# Patient Record
Sex: MALE | Race: WHITE | NOT HISPANIC OR LATINO | Employment: OTHER
[De-identification: names, ages, dates, MRNs, and addresses within clinical notes are randomized per-mention and may not be internally consistent; named-entity substitution may affect disease eponyms.]

---

## 2017-01-02 ENCOUNTER — RECORDS - HEALTHEAST (OUTPATIENT)
Dept: ADMINISTRATIVE | Facility: OTHER | Age: 72
End: 2017-01-02

## 2017-01-06 ENCOUNTER — RECORDS - HEALTHEAST (OUTPATIENT)
Dept: ADMINISTRATIVE | Facility: OTHER | Age: 72
End: 2017-01-06

## 2017-02-03 ENCOUNTER — RECORDS - HEALTHEAST (OUTPATIENT)
Dept: ADMINISTRATIVE | Facility: OTHER | Age: 72
End: 2017-02-03

## 2017-02-06 ENCOUNTER — RECORDS - HEALTHEAST (OUTPATIENT)
Dept: ADMINISTRATIVE | Facility: OTHER | Age: 72
End: 2017-02-06

## 2017-02-07 ENCOUNTER — OFFICE VISIT - HEALTHEAST (OUTPATIENT)
Dept: FAMILY MEDICINE | Facility: CLINIC | Age: 72
End: 2017-02-07

## 2017-02-07 DIAGNOSIS — Z09 HOSPITAL DISCHARGE FOLLOW-UP: ICD-10-CM

## 2017-02-07 DIAGNOSIS — Z95.5 S/P CORONARY ARTERY STENT PLACEMENT: ICD-10-CM

## 2017-02-13 ENCOUNTER — RECORDS - HEALTHEAST (OUTPATIENT)
Dept: ADMINISTRATIVE | Facility: OTHER | Age: 72
End: 2017-02-13

## 2017-02-20 ENCOUNTER — COMMUNICATION - HEALTHEAST (OUTPATIENT)
Dept: FAMILY MEDICINE | Facility: CLINIC | Age: 72
End: 2017-02-20

## 2017-02-20 DIAGNOSIS — Z95.5 S/P CORONARY ARTERY STENT PLACEMENT: ICD-10-CM

## 2017-02-21 ENCOUNTER — COMMUNICATION - HEALTHEAST (OUTPATIENT)
Dept: FAMILY MEDICINE | Facility: CLINIC | Age: 72
End: 2017-02-21

## 2017-03-23 ENCOUNTER — RECORDS - HEALTHEAST (OUTPATIENT)
Dept: ADMINISTRATIVE | Facility: OTHER | Age: 72
End: 2017-03-23

## 2017-03-29 ENCOUNTER — RECORDS - HEALTHEAST (OUTPATIENT)
Dept: ADMINISTRATIVE | Facility: OTHER | Age: 72
End: 2017-03-29

## 2017-07-21 ENCOUNTER — RECORDS - HEALTHEAST (OUTPATIENT)
Dept: ADMINISTRATIVE | Facility: OTHER | Age: 72
End: 2017-07-21

## 2017-07-21 ENCOUNTER — OFFICE VISIT - HEALTHEAST (OUTPATIENT)
Dept: FAMILY MEDICINE | Facility: CLINIC | Age: 72
End: 2017-07-21

## 2017-07-21 DIAGNOSIS — I83.90 VARICOSE VEIN OF LEG: ICD-10-CM

## 2017-07-21 DIAGNOSIS — H26.9 CATARACT: ICD-10-CM

## 2017-07-21 DIAGNOSIS — Z01.818 PREOP GENERAL PHYSICAL EXAM: ICD-10-CM

## 2017-07-21 ASSESSMENT — MIFFLIN-ST. JEOR: SCORE: 1710.37

## 2017-09-27 ENCOUNTER — RECORDS - HEALTHEAST (OUTPATIENT)
Dept: ADMINISTRATIVE | Facility: OTHER | Age: 72
End: 2017-09-27

## 2017-11-16 ENCOUNTER — COMMUNICATION - HEALTHEAST (OUTPATIENT)
Dept: SCHEDULING | Facility: CLINIC | Age: 72
End: 2017-11-16

## 2017-11-17 ENCOUNTER — OFFICE VISIT - HEALTHEAST (OUTPATIENT)
Dept: FAMILY MEDICINE | Facility: CLINIC | Age: 72
End: 2017-11-17

## 2017-11-17 DIAGNOSIS — R04.0 NASAL BLEEDING: ICD-10-CM

## 2017-11-17 DIAGNOSIS — Z95.5 S/P CORONARY ARTERY STENT PLACEMENT: ICD-10-CM

## 2017-11-17 DIAGNOSIS — R09.82 POST-NASAL DRIP: ICD-10-CM

## 2017-11-23 ENCOUNTER — RECORDS - HEALTHEAST (OUTPATIENT)
Dept: ADMINISTRATIVE | Facility: OTHER | Age: 72
End: 2017-11-23

## 2017-11-29 ENCOUNTER — RECORDS - HEALTHEAST (OUTPATIENT)
Dept: ADMINISTRATIVE | Facility: OTHER | Age: 72
End: 2017-11-29

## 2018-02-22 ENCOUNTER — COMMUNICATION - HEALTHEAST (OUTPATIENT)
Dept: FAMILY MEDICINE | Facility: CLINIC | Age: 73
End: 2018-02-22

## 2018-02-22 DIAGNOSIS — Z95.5 S/P CORONARY ARTERY STENT PLACEMENT: ICD-10-CM

## 2018-03-13 ENCOUNTER — RECORDS - HEALTHEAST (OUTPATIENT)
Dept: ADMINISTRATIVE | Facility: OTHER | Age: 73
End: 2018-03-13

## 2018-05-09 ENCOUNTER — COMMUNICATION - HEALTHEAST (OUTPATIENT)
Dept: FAMILY MEDICINE | Facility: CLINIC | Age: 73
End: 2018-05-09

## 2018-05-09 DIAGNOSIS — Z95.5 S/P CORONARY ARTERY STENT PLACEMENT: ICD-10-CM

## 2018-06-06 ENCOUNTER — RECORDS - HEALTHEAST (OUTPATIENT)
Dept: ADMINISTRATIVE | Facility: OTHER | Age: 73
End: 2018-06-06

## 2018-09-06 ENCOUNTER — RECORDS - HEALTHEAST (OUTPATIENT)
Dept: ADMINISTRATIVE | Facility: OTHER | Age: 73
End: 2018-09-06

## 2018-09-07 ENCOUNTER — RECORDS - HEALTHEAST (OUTPATIENT)
Dept: ADMINISTRATIVE | Facility: OTHER | Age: 73
End: 2018-09-07

## 2018-10-09 ENCOUNTER — RECORDS - HEALTHEAST (OUTPATIENT)
Dept: ADMINISTRATIVE | Facility: OTHER | Age: 73
End: 2018-10-09

## 2018-10-23 ENCOUNTER — RECORDS - HEALTHEAST (OUTPATIENT)
Dept: GENERAL RADIOLOGY | Facility: CLINIC | Age: 73
End: 2018-10-23

## 2018-10-23 ENCOUNTER — OFFICE VISIT - HEALTHEAST (OUTPATIENT)
Dept: FAMILY MEDICINE | Facility: CLINIC | Age: 73
End: 2018-10-23

## 2018-10-23 DIAGNOSIS — R09.82 POST-NASAL DRIP: ICD-10-CM

## 2018-10-23 DIAGNOSIS — R20.0 ANESTHESIA OF SKIN: ICD-10-CM

## 2018-10-23 DIAGNOSIS — R05.9 COUGH: ICD-10-CM

## 2018-10-23 DIAGNOSIS — R20.0 NUMBNESS IN BOTH HANDS: ICD-10-CM

## 2018-10-27 ENCOUNTER — AMBULATORY - HEALTHEAST (OUTPATIENT)
Dept: FAMILY MEDICINE | Facility: CLINIC | Age: 73
End: 2018-10-27

## 2018-10-27 DIAGNOSIS — R20.0 NUMBNESS IN BOTH HANDS: ICD-10-CM

## 2018-10-29 ENCOUNTER — AMBULATORY - HEALTHEAST (OUTPATIENT)
Dept: PHYSICAL MEDICINE AND REHAB | Facility: CLINIC | Age: 73
End: 2018-10-29

## 2018-11-01 ENCOUNTER — OFFICE VISIT - HEALTHEAST (OUTPATIENT)
Dept: FAMILY MEDICINE | Facility: CLINIC | Age: 73
End: 2018-11-01

## 2018-11-01 DIAGNOSIS — R05.9 COUGH: ICD-10-CM

## 2018-11-05 ENCOUNTER — AMBULATORY - HEALTHEAST (OUTPATIENT)
Dept: VASCULAR SURGERY | Facility: CLINIC | Age: 73
End: 2018-11-05

## 2018-11-05 DIAGNOSIS — R20.0 ARM NUMBNESS: ICD-10-CM

## 2018-11-08 ENCOUNTER — HOSPITAL ENCOUNTER (OUTPATIENT)
Dept: PHYSICAL MEDICINE AND REHAB | Facility: CLINIC | Age: 73
Discharge: HOME | End: 2018-11-08
Attending: FAMILY MEDICINE

## 2018-11-08 DIAGNOSIS — R20.2 NUMBNESS AND TINGLING IN BOTH HANDS: ICD-10-CM

## 2018-11-08 DIAGNOSIS — R20.0 NUMBNESS AND TINGLING IN BOTH HANDS: ICD-10-CM

## 2018-11-08 DIAGNOSIS — M50.30 DEGENERATION OF CERVICAL INTERVERTEBRAL DISC: ICD-10-CM

## 2018-11-13 ENCOUNTER — COMMUNICATION - HEALTHEAST (OUTPATIENT)
Dept: FAMILY MEDICINE | Facility: CLINIC | Age: 73
End: 2018-11-13

## 2018-11-13 DIAGNOSIS — Z95.5 S/P CORONARY ARTERY STENT PLACEMENT: ICD-10-CM

## 2018-11-14 RX ORDER — METOPROLOL TARTRATE 25 MG/1
TABLET, FILM COATED ORAL
Qty: 90 TABLET | Refills: 3 | Status: SHIPPED | OUTPATIENT
Start: 2018-11-14 | End: 2021-08-04

## 2018-11-20 ENCOUNTER — HOSPITAL ENCOUNTER (OUTPATIENT)
Dept: PHYSICAL MEDICINE AND REHAB | Facility: CLINIC | Age: 73
Discharge: HOME | End: 2018-11-20
Attending: PHYSICAL MEDICINE & REHABILITATION

## 2018-11-20 DIAGNOSIS — R20.0 NUMBNESS AND TINGLING IN BOTH HANDS: ICD-10-CM

## 2018-11-20 DIAGNOSIS — R20.2 NUMBNESS AND TINGLING IN BOTH HANDS: ICD-10-CM

## 2018-11-21 ENCOUNTER — COMMUNICATION - HEALTHEAST (OUTPATIENT)
Dept: PHYSICAL MEDICINE AND REHAB | Facility: CLINIC | Age: 73
End: 2018-11-21

## 2018-11-28 ENCOUNTER — OFFICE VISIT - HEALTHEAST (OUTPATIENT)
Dept: VASCULAR SURGERY | Facility: CLINIC | Age: 73
End: 2018-11-28

## 2018-11-28 ENCOUNTER — RECORDS - HEALTHEAST (OUTPATIENT)
Dept: VASCULAR ULTRASOUND | Facility: CLINIC | Age: 73
End: 2018-11-28

## 2018-11-28 DIAGNOSIS — M79.603 ARM PAIN: ICD-10-CM

## 2018-11-28 DIAGNOSIS — I73.9 PERIPHERAL VASCULAR DISEASE (H): ICD-10-CM

## 2018-11-28 DIAGNOSIS — R20.2 NUMBNESS AND TINGLING IN BOTH HANDS: ICD-10-CM

## 2018-11-28 DIAGNOSIS — R20.0 NUMBNESS AND TINGLING IN BOTH HANDS: ICD-10-CM

## 2018-11-28 DIAGNOSIS — R20.0 ANESTHESIA OF SKIN: ICD-10-CM

## 2018-12-18 ENCOUNTER — RECORDS - HEALTHEAST (OUTPATIENT)
Dept: VASCULAR ULTRASOUND | Facility: CLINIC | Age: 73
End: 2018-12-18

## 2018-12-18 DIAGNOSIS — I73.9 PERIPHERAL VASCULAR DISEASE, UNSPECIFIED (H): ICD-10-CM

## 2018-12-18 DIAGNOSIS — R20.2 PARESTHESIA OF SKIN: ICD-10-CM

## 2018-12-18 DIAGNOSIS — M79.603 PAIN IN ARM, UNSPECIFIED: ICD-10-CM

## 2018-12-18 DIAGNOSIS — R20.0 ANESTHESIA OF SKIN: ICD-10-CM

## 2019-03-25 ENCOUNTER — OFFICE VISIT - HEALTHEAST (OUTPATIENT)
Dept: FAMILY MEDICINE | Facility: CLINIC | Age: 74
End: 2019-03-25

## 2019-03-25 DIAGNOSIS — Z95.5 S/P CORONARY ARTERY STENT PLACEMENT: ICD-10-CM

## 2019-03-25 DIAGNOSIS — Z23 NEED FOR PNEUMOCOCCAL VACCINATION: ICD-10-CM

## 2019-03-25 DIAGNOSIS — D17.20 LIPOMA OF UPPER EXTREMITY, UNSPECIFIED LATERALITY: ICD-10-CM

## 2019-03-25 DIAGNOSIS — Z13.220 ENCOUNTER FOR SCREENING FOR LIPOID DISORDERS: ICD-10-CM

## 2019-03-25 DIAGNOSIS — Z00.00 ROUTINE GENERAL MEDICAL EXAMINATION AT A HEALTH CARE FACILITY: ICD-10-CM

## 2019-03-25 DIAGNOSIS — Z23 VACCINE FOR DIPHTHERIA-TETANUS: ICD-10-CM

## 2019-03-25 DIAGNOSIS — C61 MALIGNANT NEOPLASM OF PROSTATE (H): ICD-10-CM

## 2019-03-25 LAB
ALBUMIN SERPL-MCNC: 3.8 G/DL (ref 3.5–5)
ALP SERPL-CCNC: 93 U/L (ref 45–120)
ALT SERPL W P-5'-P-CCNC: 28 U/L (ref 0–45)
ANION GAP SERPL CALCULATED.3IONS-SCNC: 10 MMOL/L (ref 5–18)
AST SERPL W P-5'-P-CCNC: 28 U/L (ref 0–40)
BILIRUB SERPL-MCNC: 0.6 MG/DL (ref 0–1)
BUN SERPL-MCNC: 17 MG/DL (ref 8–28)
CALCIUM SERPL-MCNC: 9.2 MG/DL (ref 8.5–10.5)
CHLORIDE BLD-SCNC: 107 MMOL/L (ref 98–107)
CHOLEST SERPL-MCNC: 129 MG/DL
CO2 SERPL-SCNC: 24 MMOL/L (ref 22–31)
CREAT SERPL-MCNC: 0.83 MG/DL (ref 0.7–1.3)
FASTING STATUS PATIENT QL REPORTED: YES
GFR SERPL CREATININE-BSD FRML MDRD: >60 ML/MIN/1.73M2
GLUCOSE BLD-MCNC: 99 MG/DL (ref 70–125)
HDLC SERPL-MCNC: 41 MG/DL
LDLC SERPL CALC-MCNC: 75 MG/DL
POTASSIUM BLD-SCNC: 4.5 MMOL/L (ref 3.5–5)
PROT SERPL-MCNC: 7.5 G/DL (ref 6–8)
PSA SERPL-MCNC: <0.1 NG/ML (ref 0–6.5)
SODIUM SERPL-SCNC: 141 MMOL/L (ref 136–145)
TRIGL SERPL-MCNC: 66 MG/DL

## 2019-03-25 RX ORDER — NITROGLYCERIN 0.4 MG/1
0.4 TABLET SUBLINGUAL EVERY 5 MIN PRN
Qty: 90 TABLET | Refills: 3 | Status: SHIPPED | OUTPATIENT
Start: 2019-03-25 | End: 2021-12-29

## 2019-03-25 ASSESSMENT — MIFFLIN-ST. JEOR: SCORE: 1767.41

## 2019-05-14 ENCOUNTER — RECORDS - HEALTHEAST (OUTPATIENT)
Dept: ADMINISTRATIVE | Facility: OTHER | Age: 74
End: 2019-05-14

## 2019-06-08 ENCOUNTER — RECORDS - HEALTHEAST (OUTPATIENT)
Dept: ADMINISTRATIVE | Facility: OTHER | Age: 74
End: 2019-06-08

## 2019-11-19 ENCOUNTER — OFFICE VISIT - HEALTHEAST (OUTPATIENT)
Dept: FAMILY MEDICINE | Facility: CLINIC | Age: 74
End: 2019-11-19

## 2019-11-19 DIAGNOSIS — R05.9 COUGH: ICD-10-CM

## 2019-11-19 DIAGNOSIS — R09.82 POST-NASAL DRIP: ICD-10-CM

## 2019-11-19 ASSESSMENT — MIFFLIN-ST. JEOR: SCORE: 1768.54

## 2020-02-04 ENCOUNTER — RECORDS - HEALTHEAST (OUTPATIENT)
Dept: ADMINISTRATIVE | Facility: OTHER | Age: 75
End: 2020-02-04

## 2020-04-19 ENCOUNTER — COMMUNICATION - HEALTHEAST (OUTPATIENT)
Dept: FAMILY MEDICINE | Facility: CLINIC | Age: 75
End: 2020-04-19

## 2020-04-19 DIAGNOSIS — Z13.220 ENCOUNTER FOR SCREENING FOR LIPOID DISORDERS: ICD-10-CM

## 2020-06-16 ENCOUNTER — RECORDS - HEALTHEAST (OUTPATIENT)
Dept: ADMINISTRATIVE | Facility: OTHER | Age: 75
End: 2020-06-16

## 2020-07-01 ENCOUNTER — RECORDS - HEALTHEAST (OUTPATIENT)
Dept: ADMINISTRATIVE | Facility: OTHER | Age: 75
End: 2020-07-01

## 2020-07-06 ENCOUNTER — AMBULATORY - HEALTHEAST (OUTPATIENT)
Dept: FAMILY MEDICINE | Facility: CLINIC | Age: 75
End: 2020-07-06

## 2020-07-06 ENCOUNTER — VIRTUAL VISIT (OUTPATIENT)
Dept: FAMILY MEDICINE | Facility: OTHER | Age: 75
End: 2020-07-06

## 2020-07-06 DIAGNOSIS — Z20.822 SUSPECTED COVID-19 VIRUS INFECTION: ICD-10-CM

## 2020-07-07 ENCOUNTER — AMBULATORY - HEALTHEAST (OUTPATIENT)
Dept: FAMILY MEDICINE | Facility: CLINIC | Age: 75
End: 2020-07-07

## 2020-07-07 DIAGNOSIS — Z20.822 SUSPECTED COVID-19 VIRUS INFECTION: ICD-10-CM

## 2020-07-07 NOTE — PROGRESS NOTES
"Date: 2020 07:39:55  Clinician: Greer Ventura  Clinician NPI: [id number]  Patient: Daniel Jacobs  Patient : 1945  Patient Address: [address] [place] [id number]  Patient Phone: [phone]  Visit Protocol: URI  Patient Summary:  Daniel is a 75 year old ( : 1945 ) male who initiated a Visit for COVID-19 (Coronavirus) evaluation and screening. When asked the question \"Please sign me up to receive news, health information and promotions from [employer].\", Daniel responded \"No\".    Daniel states his symptoms started gradually 3-4 days ago.   His symptoms consist of tooth pain, diarrhea, enlarged lymph nodes, and a cough.   Symptom details     Cough: Daniel coughs every 5-10 minutes and his cough is more bothersome at night. Phlegm does not come into his throat when he coughs. He believes his cough is caused by post-nasal drip.     Tooth pain: The tooth pain is caused by a cavity, recent dental work, or other mouth problems.      Daniel denies having wheezing, nausea, ageusia, vomiting, rhinitis, malaise, ear pain, headache, chills, sore throat, myalgias, anosmia, facial pain or pressure, fever, and nasal congestion. He also denies having recent facial or sinus surgery in the past 60 days, taking antibiotic medication in the past month, and double sickening (worsening symptoms after initial improvement). He is not experiencing dyspnea.   Precipitating events  He has not recently been exposed to someone with influenza. Daniel has been in close contact with the following high risk individuals: people with asthma, heart disease or diabetes and adults 65 or older.   Pertinent COVID-19 (Coronavirus) information  In the past 14 days, Daniel has not worked in a congregate living setting.   He does not work or volunteer as healthcare worker or a  and does not work or volunteer in a healthcare facility.   Daniel also has not lived in a congregate living setting in the past 14 days. He " does not live with a healthcare worker.   Daniel has not had a close contact with a laboratory-confirmed COVID-19 patient within 14 days of symptom onset.   Pertinent medical history  Daniel does not need a return to work/school note.   Weight: 215 lbs   Daniel does not smoke or use smokeless tobacco.   Additional information as reported by the patient (free text): heart attack in 2017   Weight: 215 lbs    MEDICATIONS: aspirin oral, atorvastatin oral, metoprolol tartrate-hydrochlorothiazide oral, ALLERGIES: codeine, Augmentin  Clinician Response:  Dear Daniel,   Your symptoms show that you may have coronavirus (COVID-19). This illness can cause fever, cough and trouble breathing. Many people get a mild case and get better on their own. Some people can get very sick.  What should I do?  We would like to test you for this virus.   1. Please call [phone] to schedule your visit. Explain that [facility] were [facility] have a COVID-19 test. Be ready to share [facility].  The following will serve as your written order for this COVID Test, ordered by me, for the indication of suspected COVID [Z20.828]: The test will be ordered in [employer] our electronic health record, after you are scheduled. It will show as ordered and authorized by Tez Thompson MD.  Order: COVID-19 (Coronavirus) PCR for SYMPTOMATIC testing from [facility].      2. When it's time for your COVID test:  Stay at least 6 feet away from others. (If someone will drive you to your test, stay in the backseat, as far away from the  as you can.)   Cover your mouth and nose with a mask, tissue or washcloth.  Go straight to the testing site. Don't make any stops on the way there or back.      3.Starting now: Stay home and away from others (self-isolate) until:   You've had no fever---and no medicine that reduces fever---for 3 full days (72 hours). And...   Your other symptoms have gotten better. For example, your cough or breathing has improved.  "And...   At least 10 days have passed since your symptoms started.       During this time, don't leave the house except for testing or medical care.   Stay in your own room, even for meals. Use your own bathroom if you can.   Stay away from others in your home. No hugging, kissing or shaking hands. No visitors.  Don't go to work, school or anywhere else.    Clean \"high touch\" surfaces often (doorknobs, counters, handles, etc.). Use a household cleaning spray or wipes. You'll find a full list of  on the EPA website: www.epa.gov/pesticide-registration/list-n-disinfectants-use-against-sars-cov-2.   Cover your mouth and nose with a mask, tissue or washcloth to avoid spreading germs.  Wash your hands and face often. Use soap and water.  Caregivers in these groups are at risk for severe illness due to COVID-19:  o People 65 years and older  o People who live in a nursing home or long-term care facility  o People with chronic disease (lung, heart, cancer, diabetes, kidney, liver, immunologic)  o People who have a weakened immune system, including those who:   Are in cancer treatment  Take medicine that weakens the immune system, such as corticosteroids  Had a bone marrow or organ transplant  Have an immune deficiency  Have poorly controlled HIV or AIDS  Are obese (body mass index of 40 or higher)  Smoke regularly   o Caregivers should wear gloves while washing dishes, handling laundry and cleaning bedrooms and bathrooms.  o Use caution when washing and drying laundry: Don't shake dirty laundry, and use the warmest water setting that you can.  o For more tips, go to www.cdc.gov/coronavirus/2019-ncov/downloads/10Things.pdf.    4.Sign up for [employer]. We know it's scary to hear that you might have COVID-19. We want to track your symptoms to make sure you're okay over the next 2 weeks. Please look for an email from [employer]---this is a free, online program that we'll use to keep in touch. To sign up, follow the " link in the email. Learn more at [email or web address]  How can I take care of myself?   Get lots of rest. Drink extra fluids (unless a doctor has told you not to).   Take Tylenol (acetaminophen) for fever or pain. If you have liver or kidney problems, ask your family doctor if it's okay to take Tylenol.   Adults can take either:    650 mg (two 325 mg pills) every 4 to 6 hours, or...   1,000 mg (two 500 mg pills) every 8 hours as needed.    Note: Don't take more than 3,000 mg in one day. Acetaminophen is found in many medicines (both prescribed and over-the-counter medicines). Read all labels to be sure you don't take too much.   For children, check the Tylenol bottle for the right dose. The dose is based on the child's age or weight.    If you have other health problems (like cancer, heart failure, an organ transplant or severe kidney disease): Call your specialty clinic if you don't feel better in the next 2 days.       Know when to call 911. Emergency warning signs include:    Trouble breathing or shortness of breath Pain or pressure in the chest that doesn't go away Feeling confused like you haven't felt before, or not being able to wake up Bluish-colored lips or face.  Where can I get more information?   [facility] -- About COVID-19: [email or web address]   CDC -- What to Do If You're Sick: www.cdc.gov/coronavirus/2019-ncov/about/steps-when-sick.html   CDC -- Ending Home Isolation: www.cdc.gov/coronavirus/2019-ncov/hcp/disposition-in-home-patients.html   CDC -- Caring for Someone: www.cdc.gov/coronavirus/2019-ncov/if-you-are-sick/care-for-someone.html   [facility] -- Interim Guidance for Hospital Discharge to Home: www.health.[facility].mn.us/diseases/coronavirus/hcp/hospdischarge.pdf   [facility] clinical trials (COVID-19 research studies): clinicalaffairs.[facility]/umn-clinical-trials    Below are the COVID-19 hotlines at the [place] Department of Health ([facility]). Interpreters are  available.    For health questions: Call 917-545-4578 or 1-501.335.5993 (7 a.m. to 7 p.m.) For questions about schools and childcare: Call 895-798-5611 or 1-164.723.6644 (7 a.m. to 7 p.m.)    Diagnosis: Cough  Diagnosis ICD: R05  Additional Clinician Notes: Dear Daniel, You could possibly have covid. Please call the number listed to be tested and you will also need to self isolate for 10 days per instructions. I hope you feel better soon. Take care.

## 2020-07-10 ENCOUNTER — OFFICE VISIT - HEALTHEAST (OUTPATIENT)
Dept: FAMILY MEDICINE | Facility: CLINIC | Age: 75
End: 2020-07-10

## 2020-07-10 DIAGNOSIS — R09.82 POST-NASAL DRIP: ICD-10-CM

## 2020-07-10 DIAGNOSIS — R05.9 COUGH: ICD-10-CM

## 2020-07-10 RX ORDER — KETOCONAZOLE 20 MG/G
CREAM TOPICAL
Status: SHIPPED | COMMUNITY
Start: 2020-03-01 | End: 2021-12-14

## 2020-07-10 ASSESSMENT — MIFFLIN-ST. JEOR: SCORE: 1786.8

## 2020-07-12 ENCOUNTER — COMMUNICATION - HEALTHEAST (OUTPATIENT)
Dept: FAMILY MEDICINE | Facility: CLINIC | Age: 75
End: 2020-07-12

## 2020-09-23 ENCOUNTER — RECORDS - HEALTHEAST (OUTPATIENT)
Dept: ADMINISTRATIVE | Facility: OTHER | Age: 75
End: 2020-09-23

## 2020-12-22 ENCOUNTER — OFFICE VISIT - HEALTHEAST (OUTPATIENT)
Dept: FAMILY MEDICINE | Facility: CLINIC | Age: 75
End: 2020-12-22

## 2020-12-22 DIAGNOSIS — I73.9 PERIPHERAL VASCULAR DISEASE (H): ICD-10-CM

## 2020-12-22 DIAGNOSIS — C61 MALIGNANT NEOPLASM OF PROSTATE (H): ICD-10-CM

## 2020-12-22 DIAGNOSIS — Z13.220 ENCOUNTER FOR SCREENING FOR LIPOID DISORDERS: ICD-10-CM

## 2020-12-22 DIAGNOSIS — Z11.59 NEED FOR HEPATITIS C SCREENING TEST: ICD-10-CM

## 2020-12-22 DIAGNOSIS — Z00.00 ROUTINE GENERAL MEDICAL EXAMINATION AT A HEALTH CARE FACILITY: ICD-10-CM

## 2020-12-22 LAB
ALBUMIN SERPL-MCNC: 3.8 G/DL (ref 3.5–5)
ALP SERPL-CCNC: 108 U/L (ref 45–120)
ALT SERPL W P-5'-P-CCNC: 23 U/L (ref 0–45)
ANION GAP SERPL CALCULATED.3IONS-SCNC: 7 MMOL/L (ref 5–18)
AST SERPL W P-5'-P-CCNC: 27 U/L (ref 0–40)
BILIRUB SERPL-MCNC: 0.6 MG/DL (ref 0–1)
BUN SERPL-MCNC: 17 MG/DL (ref 8–28)
CALCIUM SERPL-MCNC: 8.8 MG/DL (ref 8.5–10.5)
CHLORIDE BLD-SCNC: 108 MMOL/L (ref 98–107)
CHOLEST SERPL-MCNC: 121 MG/DL
CO2 SERPL-SCNC: 28 MMOL/L (ref 22–31)
CREAT SERPL-MCNC: 0.96 MG/DL (ref 0.7–1.3)
FASTING STATUS PATIENT QL REPORTED: YES
GFR SERPL CREATININE-BSD FRML MDRD: >60 ML/MIN/1.73M2
GLUCOSE BLD-MCNC: 101 MG/DL (ref 70–125)
HDLC SERPL-MCNC: 37 MG/DL
LDLC SERPL CALC-MCNC: 70 MG/DL
POTASSIUM BLD-SCNC: 5.2 MMOL/L (ref 3.5–5)
PROT SERPL-MCNC: 7.2 G/DL (ref 6–8)
PSA SERPL-MCNC: <0.1 NG/ML (ref 0–6.5)
SODIUM SERPL-SCNC: 143 MMOL/L (ref 136–145)
TRIGL SERPL-MCNC: 71 MG/DL

## 2020-12-22 ASSESSMENT — MIFFLIN-ST. JEOR: SCORE: 1811.29

## 2020-12-23 LAB — HCV AB SERPL QL IA: NEGATIVE

## 2021-04-09 ENCOUNTER — OFFICE VISIT - HEALTHEAST (OUTPATIENT)
Dept: FAMILY MEDICINE | Facility: CLINIC | Age: 76
End: 2021-04-09

## 2021-04-09 DIAGNOSIS — I73.9 PERIPHERAL VASCULAR DISEASE (H): ICD-10-CM

## 2021-04-09 DIAGNOSIS — M76.61 ACHILLES TENDINITIS, RIGHT LEG: ICD-10-CM

## 2021-04-09 DIAGNOSIS — K21.9 GASTROESOPHAGEAL REFLUX DISEASE WITHOUT ESOPHAGITIS: ICD-10-CM

## 2021-04-11 ENCOUNTER — COMMUNICATION - HEALTHEAST (OUTPATIENT)
Dept: FAMILY MEDICINE | Facility: CLINIC | Age: 76
End: 2021-04-11

## 2021-04-11 DIAGNOSIS — Z13.220 ENCOUNTER FOR SCREENING FOR LIPOID DISORDERS: ICD-10-CM

## 2021-04-13 RX ORDER — ATORVASTATIN CALCIUM 40 MG/1
TABLET, FILM COATED ORAL
Qty: 90 TABLET | Refills: 3 | Status: SHIPPED | OUTPATIENT
Start: 2021-04-13 | End: 2022-03-30

## 2021-05-27 NOTE — PROGRESS NOTES
Assessment and Plan:       1. Routine general medical examination at a health care facility  - Comprehensive Metabolic Panel  - JIC LAV    2. Encounter for screening for lipoid disorders  - atorvastatin (LIPITOR) 40 MG tablet; Take 1 tablet (40 mg total) by mouth daily.  Dispense: 90 tablet; Refill: 3  - Lipid Cascade, RANDOM    3. S/P coronary artery stent placement  - nitroglycerin (NITROSTAT) 0.4 MG SL tablet; Place 1 tablet (0.4 mg total) under the tongue every 5 (five) minutes as needed for chest pain.  Dispense: 90 tablet; Refill: 3    4. Malignant neoplasm of prostate (H)  - PSA, Diagnostic (Prostatic-Specific Antigen)    5. Vaccine for diphtheria-tetanus  - Td, Preservative Free (green label)    6. Need for pneumococcal vaccination  - Pneumococcal conjugate vaccine 13-valent 6wks-17yrs; >50yrs    Did review his chronic medical concerns which at this time are stable.  We will get his labs as noted.  He also did have a history of prostate cancer which was managed surgically he has had normal prostate PSA in the past but wanted to have it checked because of having seen the urologist for some time now.  This was ordered.  He did have questions regarding doing a marathon.  There is a personal history of cardiovascular disease but he has had to not to use his nitroglycerin.  He is asymptomatic.  I did encourage him to first start with training for the marathon and if he is able to increase consistently then he should be able to run the marathon.  I encouraged him not to push himself excessively.  Labs were ordered as noted and medications were refilled.  His health maintenance is up-to-date but his immunization was updated today 2.  The patient's current medical problems were reviewed.    I have had an Advance Directives discussion with the patient.  The following health maintenance schedule was reviewed with the patient and provided in printed form in the after visit summary:   Health Maintenance   Topic Date  Due     ZOSTER VACCINES (1 of 2) 04/09/1997     ADVANCE DIRECTIVES DISCUSSED WITH PATIENT  02/08/2012     PNEUMOCOCCAL CONJUGATE VACCINE FOR ADULTS (PCV13 OR PREVNAR)  04/09/2012     INFLUENZA VACCINE RULE BASED (1) 08/01/2018     TD 18+ HE  09/11/2018     FALL RISK ASSESSMENT  10/23/2019     COLONOSCOPY  09/07/2023     PNEUMOCOCCAL POLYSACCHARIDE VACCINE AGE 65 AND OVER  Completed        Subjective:   Chief Complaint: Daniel Jacobs is an 73 y.o. male here for an Annual Wellness visit.   HPI: Comes in today for physical exam and wellness visit.  He noted no major concerns at this time, he feels good and has not really been exercising currently.  He wanted to run a marathon or a half marathon and was wondering if i think that he can do that.  He does have a history of cardiovascular stent but noted that he has remained stable and asymptomatic.  He wants to have his medication refilled and updated.  He is otherwise up-to-date and all of his health maintenance.    Review of Systems:    Constitutional:Denied any fatigue no fevers no chills.  Has good appetite.  HEENT: Does not have any neck pain.  No difficulty swallowing denies having any postnasal drips.    Respiratory: There is no cough.  No chest wall pain.  Cardiovascular: Denied chest pain shortness of breath or palpitations.  There is no notable lower extremity swelling.    Gastrointestinal: Denies nausea vomiting.  No abdominal pain no diarrhea or constipation.  Endocrine:Has no sensitivity to cold or heat.  Denied undue thirst.   Genitourinary:Has no urinary symptoms, no nocturia but he does have a pump in his testicle for urinary incontinence management..  Musculoskeletal: There is no musculoskeletal pain and swelling.    Skin: He does not have any rashes.   Allergic/Immunologic: Negative.   Neurological: No Numbness  Hematological: Negative.   Psychiatric/Behavioral: No anxiety or depression symptoms.    Please see above.  The rest of the review of systems  are negative for all systems.    Patient Care Team:  Christina Mims MD as PCP - General (Family Medicine)     Patient Active Problem List   Diagnosis     Prostate Cancer     Prostate Cancer     Nephrolithiasis     Lower Back Pain     Urinary Incontinence     Past Medical History:   Diagnosis Date     Low back pain      Prostate cancer (H) 2007      Past Surgical History:   Procedure Laterality Date     HERNIA REPAIR       INSERT / REPLACE / REMOVE PROSTHESIS URETHRAL SPHINCTER N/A 6/23/2014    Procedure: ARTIFICIAL URINARY SPHINCTER INSERTION;  Surgeon: Torres Sanabria MD;  Location: [facility] OR;  Service:      INSERT / REPLACE / REMOVE PROSTHESIS URETHRAL SPHINCTER N/A 2/16/2015    Procedure: REMOVAL/REPLACEMENT OF ARTIFICIAL URINARY SPHINCTER COMPONENT;  Surgeon: Torres Sanabria MD;  Location: [facility] OR;  Service:      LUMBAR SPINE SURGERY  1981     prostatetomy        Family History   Problem Relation Age of Onset     Prostate cancer Brother       Social History     Socioeconomic History     Marital status:      Spouse name: Not on file     Number of children: Not on file     Years of education: Not on file     Highest education level: Not on file   Occupational History     Not on file   Social Needs     Financial resource strain: Not on file     Food insecurity:     Worry: Not on file     Inability: Not on file     Transportation needs:     Medical: Not on file     Non-medical: Not on file   Tobacco Use     Smoking status: Never Smoker     Smokeless tobacco: Never Used   Substance and Sexual Activity     Alcohol use: No     Drug use: No     Sexual activity: Not on file   Lifestyle     Physical activity:     Days per week: Not on file     Minutes per session: Not on file     Stress: Not on file   Relationships     Social connections:     Talks on phone: Not on file     Gets together: Not on file     Attends [employer] service: Not on file     Active member of club or  "organization: Not on file     Attends meetings of clubs or organizations: Not on file     Relationship status: Not on file     Intimate partner violence:     Fear of current or ex partner: Not on file     Emotionally abused: Not on file     Physically abused: Not on file     Forced sexual activity: Not on file   Other Topics Concern     Not on file   Social History Narrative     Not on file      Current Outpatient Medications   Medication Sig Dispense Refill     aspirin 81 MG tablet Take 81 mg by mouth daily.       atorvastatin (LIPITOR) 40 MG tablet Take 40 mg by mouth.       metoprolol tartrate (LOPRESSOR) 25 MG tablet TAKE ONE-HALF (1/2) TABLET TWICE A DAY 90 tablet 3     OMEGA-3/DHA/EPA/FISH OIL (FISH OIL-OMEGA-3 FATTY ACIDS) 300-1,000 mg capsule Take 2 g by mouth daily.       nitroglycerin (NITROSTAT) 0.4 MG SL tablet Place 1 tablet (0.4 mg total) under the tongue every 5 (five) minutes as needed for chest pain. 90 tablet 0     Current Facility-Administered Medications   Medication Dose Route Frequency Provider Last Rate Last Dose     ipratropium-albuterol 0.5-2.5 mg/3 mL nebulizer solution 3 mL (DUO-NEB)  3 mL Nebulization Once Christina Mims MD          Objective:   Vital Signs:   Visit Vitals  /82 (Patient Site: Left Arm, Patient Position: Sitting, Cuff Size: Adult Large)   Pulse 69   Ht 5' 11.75\" (1.822 m)   Wt 219 lb (99.3 kg)   SpO2 96%   BMI 29.91 kg/m         VisionScreening:  No exam data present      Physical Exam:  General Appearance: Alert, cooperative, no distress, appears stated age  Head: Normocephalic, without obvious abnormality, atraumatic  Eyes: PERRL, conjunctiva/corneas clear, EOM's intact  Ears: Normal TM's and external ear canals, both ears  Nose: Nares normal, septum midline,mucosa normal, no drainage  Throat: Lips, mucosa, and tongue normal; teeth and gums normal  Neck: Supple, symmetrical, trachea midline, no adenopathy;  thyroid: not enlarged, symmetric, no " tenderness.  Back: Symmetric, no curvature, ROM normal, no CVA tenderness  Lungs: Clear to auscultation bilaterally, respirations unlabored  Heart: Regular rate and rhythm, S1 and S2 normal, no murmur, rub, or gallop,  Abdomen: Soft, non-tender, bowel sounds active all four quadrants,  no masses, no organomegaly  Musculoskeletal: Normal range of motion. No joint swelling or deformity.   Extremities: Extremities normal, atraumatic, no cyanosis or edema  Skin: Skin color, texture, turgor normal, no rashes .  He does have a lipoma noted on both forearms distal aspect.  Lymph nodes: Cervical, supraclavicular, and axillary nodes normal  Neurologic: He is alert. He has normal reflexes.   Psychiatric: He has a normal mood and affect.       Assessment Results 3/25/2019   Activities of Daily Living No help needed   Instrumental Activities of Daily Living No help needed   Mini Cog Total Score 5   Some recent data might be hidden     A Mini-Cog score of 0-2 suggests the possibility of dementia, score of 3-5 suggests no dementia    Identified Health Risks:     He is at risk for lack of exercise and has been provided with information to increase physical activity for the benefit of his well-being.  The patient was counseled and encouraged to consider modifying their diet and eating habits. He was provided with information on recommended healthy diet options.  Information on urinary incontinence and treatment options given to patient.  Information regarding advance directives (living [place]), including where he can download the appropriate form, was provided to the patient via the AVS.

## 2021-05-30 ENCOUNTER — RECORDS - HEALTHEAST (OUTPATIENT)
Dept: ADMINISTRATIVE | Facility: CLINIC | Age: 76
End: 2021-05-30

## 2021-05-30 VITALS — BODY MASS INDEX: 27.88 KG/M2 | WEIGHT: 211.3 LBS

## 2021-05-31 ENCOUNTER — RECORDS - HEALTHEAST (OUTPATIENT)
Dept: ADMINISTRATIVE | Facility: CLINIC | Age: 76
End: 2021-05-31

## 2021-05-31 VITALS — WEIGHT: 203.8 LBS | HEIGHT: 73 IN | BODY MASS INDEX: 27.01 KG/M2

## 2021-05-31 VITALS — BODY MASS INDEX: 27.29 KG/M2 | WEIGHT: 204 LBS

## 2021-06-02 VITALS — HEIGHT: 72 IN | WEIGHT: 219 LBS | BODY MASS INDEX: 29.66 KG/M2

## 2021-06-02 VITALS — WEIGHT: 217 LBS | BODY MASS INDEX: 29.03 KG/M2

## 2021-06-02 VITALS — WEIGHT: 219 LBS | BODY MASS INDEX: 29.29 KG/M2

## 2021-06-02 VITALS — WEIGHT: 213.1 LBS | BODY MASS INDEX: 28.5 KG/M2

## 2021-06-03 NOTE — PROGRESS NOTES
Assessment/Plan:        Diagnoses and all orders for this visit:    Post-nasal drip    Cough  -     azithromycin (ZITHROMAX) 250 MG tablet; Take 500 mg (2 x 250 mg tablets) on day 1 followed by 250 mg (1 tablet) on days 2-5.  Dispense: 6 tablet; Refill: 0  The cough has been going on for the past 3 weeks and appears to be getting worse though it is some dry cough he does have postnasal drip I think that he still has some irritation on the sinuses which is causing the postnasal drip.  He will continue to use the over-the-counter medications that he has used in the past.Started on Zithromax and he will follow-up if there is no improvement.        Subjective:    Patient ID: Daniel Jacobs is a 74 y.o. male.    72-year-old male who comes in today with concerns of having cough that has been going on for the past 3 weeks.  He noted 3 weeks ago starting to have some runny nose and congestion to the sinuses.  Noted having some cough as at that time.  Symptoms of sinus congestion has since then gotten better and disappeared but he continues to cough.  Cough is said to be worse in the evening or when he is laying down at night when he is sleeping.  It is intermittently dry as well as productive sometimes.  He does not have any runny nose but noted having some tickle behind his throat.  He does have mild sore throat.  He denies having any chest pain no shortness of breath and does not have any wheezing.  He noted no headaches, no ear pain, and no dizziness or lightheadedness.  He has been having a slight fatigue because of not sleeping.  Noted cough been bothersome to the wife.      The following portions of the patient's history were reviewed and updated as appropriate: allergies, current medications, past family history, past medical history, past social history, past surgical history and problem list.    Review of Systems   Constitutional: Positive for fatigue. Negative for appetite change, chills and fever.   HENT:  Positive for postnasal drip and sore throat. Negative for congestion, rhinorrhea, sinus pressure, sinus pain and trouble swallowing.    Respiratory: Positive for cough. Negative for chest tightness, shortness of breath and wheezing.    Cardiovascular: Negative for chest pain.   Genitourinary: Negative.    Neurological: Negative for light-headedness and headaches.     Vitals:    11/19/19 1039   BP: 118/58   Pulse: 73   Temp: 97.5  F (36.4  C)   TempSrc: Oral   SpO2: 98%   Weight: 218 lb 6 oz (99.1 kg)   Height: 6' (1.829 m)             Objective:    Physical Exam   Constitutional: He appears well-developed and well-nourished. No distress.   HENT:   Right Ear: Tympanic membrane normal.   Left Ear: Tympanic membrane normal.   Nose: Right sinus exhibits no maxillary sinus tenderness and no frontal sinus tenderness. Left sinus exhibits no maxillary sinus tenderness and no frontal sinus tenderness.   Mouth/Throat: No oropharyngeal exudate, posterior oropharyngeal edema or posterior oropharyngeal erythema.   He does have mild postnasal drip.   Neck: Normal range of motion. No thyromegaly present.   Cardiovascular: Normal rate and regular rhythm.   Pulmonary/Chest: Effort normal and breath sounds normal.   Lymphadenopathy:     He has no cervical adenopathy.   Neurological: He is alert.

## 2021-06-04 VITALS
HEART RATE: 75 BPM | TEMPERATURE: 97.8 F | HEIGHT: 72 IN | OXYGEN SATURATION: 96 % | WEIGHT: 222.4 LBS | SYSTOLIC BLOOD PRESSURE: 110 MMHG | BODY MASS INDEX: 30.12 KG/M2 | DIASTOLIC BLOOD PRESSURE: 68 MMHG

## 2021-06-04 VITALS
TEMPERATURE: 97.5 F | HEART RATE: 73 BPM | DIASTOLIC BLOOD PRESSURE: 58 MMHG | OXYGEN SATURATION: 98 % | SYSTOLIC BLOOD PRESSURE: 118 MMHG | WEIGHT: 218.38 LBS | BODY MASS INDEX: 29.58 KG/M2 | HEIGHT: 72 IN

## 2021-06-05 VITALS
HEART RATE: 70 BPM | BODY MASS INDEX: 31.67 KG/M2 | OXYGEN SATURATION: 99 % | WEIGHT: 233.5 LBS | SYSTOLIC BLOOD PRESSURE: 122 MMHG | DIASTOLIC BLOOD PRESSURE: 60 MMHG

## 2021-06-05 VITALS
HEART RATE: 76 BPM | TEMPERATURE: 97.6 F | HEIGHT: 72 IN | WEIGHT: 227.8 LBS | BODY MASS INDEX: 30.85 KG/M2 | SYSTOLIC BLOOD PRESSURE: 124 MMHG | DIASTOLIC BLOOD PRESSURE: 80 MMHG

## 2021-06-08 NOTE — PROGRESS NOTES
ASSESSMENT:  1. Hospital discharge follow-up    2. S/P coronary artery stent placement  - metoprolol tartrate (LOPRESSOR) 25 MG tablet; Take 0.5 tablets (12.5 mg total) by mouth 2 (two) times a day.  Dispense: 90 tablet; Refill: 3  - atorvastatin (LIPITOR) 80 MG tablet; Take 1 tablet (80 mg total) by mouth bedtime.  Dispense: 90 tablet; Refill: 3  - ticagrelor (BRILINTA) 90 mg Tab; Take 1 tablet (90 mg total) by mouth 2 (two) times a day.  Dispense: 180 tablet; Refill: 3      PLAN:  There are no Patient Instructions on file for this visit.    No orders of the defined types were placed in this encounter.    Medications Discontinued During This Encounter   Medication Reason     metoprolol tartrate (LOPRESSOR) 25 MG tablet Reorder     atorvastatin (LIPITOR) 80 MG tablet Reorder     ticagrelor (BRILINTA) 90 mg Tab Reorder       No Follow-up on file.     All medications were reconciled. Encouraged patient to stay active as tolerable. Discussed healthy fats and eating a balanced diet. Encouraged patient to increase vegetables and fruits in his diet. Advised patient to watch out for moodiness. Advised patient to use Vaseline for bloody nose; also encouraged saline nasal sprays. Refilled medications, as noted.     CHIEF COMPLAINT:  Chief Complaint   Patient presents with     Hospital Visit Follow Up     [facility] from [facility] 2/3       HISTORY OF PRESENT ILLNESS:  Daniel is a 71 y.o. male presenting to the clinic today with his wife for a hospital visit follow-up regarding myocardial infarction. On the morning of February 3, he was lying in bed because he did not work that day. He normally does not sleep on his stomach, but he rolled on his stomach for some reason. He began to experience left-sided chest pain and sweating. He went to the bathroom and had some diarrhea. He went back to bed, his wife came to the bedroom, and she notes that he looked pale. They called 911, and he was brought to [facility] by ambulance. He was  admitted the same day. He had angioplasty and stenting of the LAD, which was 100% blocked. He was discharged on February 6, and he was started on metoprolol, atorvastatin, and ticagrelor. Today, he overall feels much better. He has an occasional bloody nose, and he has been using a humidifier. He denies muscle aches and pains, chest pain, and dizziness. He will start cardiac rehab and have a follow-up with the Cardiologist. He has an MRI of the heart scheduled for February 13.     REVIEW OF SYSTEMS:   He does not snore. He does not have swelling in his feet. All other systems are negative.     PFSH:  Reviewed, as below.     TOBACCO USE:  History   Smoking Status     Never Smoker   Smokeless Tobacco     Never Used       VITALS:  Vitals:    02/07/17 1138   BP: 124/64   Patient Site: Left Arm   Patient Position: Sitting   Cuff Size: Adult Regular   Pulse: 76   Weight: 211 lb 4.8 oz (95.8 kg)     Wt Readings from Last 3 Encounters:   02/07/17 211 lb 4.8 oz (95.8 kg)   12/13/16 212 lb 3.2 oz (96.3 kg)   02/16/15 209 lb 6.4 oz (95 kg)     Body mass index is 27.88 kg/(m^2).    PHYSICAL EXAM:  General Appearance: Alert, cooperative, no distress, appears stated age  Head: Normocephalic, without obvious abnormality, atraumatic  Eyes: Pupils equal, symmetric  Ears: Normal TMs and external ear canals, both ears  Nose: Nares normal, septum midline, mucosa normal, no drainage  Throat: Lips, mucosa, and tongue normal; teeth and gums normal  Neck: Supple, symmetrical, trachea midline, no adenopathy;  thyroid: not enlarged, symmetric, no tenderness/mass/nodules  Lungs: Clear to auscultation bilaterally, respirations unlabored  Heart: Regular rate and rhythm, S1 and S2 normal, no murmur, rub, or gallop  Abdomen: Soft, non-tender, bowel sounds active all four quadrants, no masses, no organomegaly  Musculoskeletal: Normal range of motion. No joint swelling or deformity.   Extremities normal, atraumatic, no cyanosis or edema  Skin:  Right groin area has a puncture site that is healing with surrounding mild bruise, non-tender, no signs of infection.   Lymph nodes: Cervical nodes normal  Neurologic:  Alert and oriented times 3. Normal reflexes. Cranial nerves II-XII intact.   Psychiatric: Normal mood and affect.      QUALITY MEASURES:  The following high BMI interventions were performed this visit: encouragement to exercise      ADDITIONAL HISTORY SUMMARIZED (2): None.  DECISION TO OBTAIN EXTRA INFORMATION (1): Requested information from Care everywhere.  RADIOLOGY TESTS (1): None.  LABS (1): None.  MEDICINE TESTS (1): None.  INDEPENDENT REVIEW (2 each): None.     The visit lasted a total of 28 minutes face to face with the patient. Over 50% of the time was spent counseling and educating the patient about s/p myocardial infarction. An additional 1 minute was spent on exercise encouragement.    Quincy GRUBER, am scribing for and in the presence of, Dr. Mims.    Dr. Felicita GRUBER, personally performed the services described in this documentation, as scribed by Quincy Soni in my presence, and it is both accurate and complete.    MEDICATIONS:  Current Outpatient Prescriptions   Medication Sig Dispense Refill     aspirin 81 MG tablet Take 81 mg by mouth daily.       atorvastatin (LIPITOR) 80 MG tablet Take 1 tablet (80 mg total) by mouth bedtime. 90 tablet 3     metoprolol tartrate (LOPRESSOR) 25 MG tablet Take 0.5 tablets (12.5 mg total) by mouth 2 (two) times a day. 90 tablet 3     nitroglycerin (NITROSTAT) 0.4 MG SL tablet Place 0.4 mg under the tongue.       OMEGA-3/DHA/EPA/FISH OIL (FISH OIL-OMEGA-3 FATTY ACIDS) 300-1,000 mg capsule Take 2 g by mouth daily.       ticagrelor (BRILINTA) 90 mg Tab Take 1 tablet (90 mg total) by mouth 2 (two) times a day. 180 tablet 3     No current facility-administered medications for this visit.        Total data points: 1

## 2021-06-09 NOTE — PROGRESS NOTES
Assessment/Plan:        Diagnoses and all orders for this visit:    Cough  -     azithromycin (ZITHROMAX) 250 MG tablet; Take 500 mg (2 x 250 mg tablets) on day 1 followed by 250 mg (1 tablet) on days 2-5.  Dispense: 6 tablet; Refill: 0    Post-nasal drip  He has post nasal drainage may be from allergy or mild infection. Has been going on for the past 3 weeks. Tested negative for Covid 19. Will treat him as noted above. He does not need Allergy testing.        Subjective:    Patient ID: Daniel Jacobs is a 75 y.o. male.    Cough   This is a new problem. The current episode started 1 to 4 weeks ago (3 weeks ago). The problem occurs constantly. The problem has been waxing and waning. Associated symptoms include coughing. Pertinent negatives include no abdominal pain, change in bowel habit, chills, congestion, fatigue, fever, sore throat or visual change. He has tried acetaminophen for the symptoms. The treatment provided mild relief.   Had same illness about 6 months ago. Worried about allergy and tooth infection that he has.    The following portions of the patient's history were reviewed and updated as appropriate: allergies, current medications, past family history, past medical history, past social history, past surgical history and problem list.    Review of Systems   Constitutional: Negative for chills, fatigue and fever.   HENT: Positive for postnasal drip. Negative for congestion, ear discharge, ear pain, rhinorrhea, sinus pressure, sinus pain and sore throat.    Respiratory: Positive for cough. Negative for chest tightness and wheezing.    Gastrointestinal: Negative for abdominal pain and change in bowel habit.     Vitals:    07/10/20 0916   BP: 110/68   Patient Site: Left Arm   Patient Position: Sitting   Cuff Size: Adult Regular   Pulse: 75   Temp: 97.8  F (36.6  C)   SpO2: 96%   Weight: (!) 222 lb 6.4 oz (100.9 kg)   Height: 6' (1.829 m)             Objective:    Physical Exam   Constitutional: He appears  well-developed and well-nourished. No distress.   HENT:   Right Ear: Tympanic membrane normal.   Left Ear: Tympanic membrane normal.   Nose: Nose normal.   Mild post nasal drainage.   Neck: Normal range of motion.   Cardiovascular: Normal rate and regular rhythm.   Pulmonary/Chest: Effort normal and breath sounds normal. He has no wheezes. He has no rales.   Lymphadenopathy:     He has no cervical adenopathy.

## 2021-06-12 NOTE — PROGRESS NOTES
Assessment:        72 y.o. male with planned surgery for cataract removal left eye on 14 August 2017.  Known risk factors for perioperative complications: Coronary disease    Difficulty with intubation is not anticipated if needed.    Cardiac Risk Estimation: Has personal history of CVD with stent placement about 6 months ago.  He is currently symptomatic.Daniel Jacobs continues to be active active.Has more than 4 METs for Metabolic equivalents.  Surgical risk: Surgical class II.  Perioperative cardiac risk: 0.9% risk of major cardiac event.  There is no cardiovascular contraindication for the planned surgery as surgery is low risk and patient is stable with his cardiovascular disease..  Current medications which may produce withdrawal symptoms if withheld perioperatively: None     He has concerns of dry cough which likelihood is associated with probably lisinopril.  He noted the cough is very mild and not disturbing.  I advised that he watch that if cough is becoming more bothersome or more regular will need to take off lisinopril.  Blood pressure is well controlled to slightly low but he does not have any lightheadedness.  Will continue with the medications at this point.   Plan:      1. Preoperative workup as follows :none but he has an echocardiogram that was done in March 2017..  2. Change in medication regimen before surgery: He is advised to continue with his medications as is, he will take his lisinopril as well as metoprolol the morning of the surgery, he can continue with the Brilinta as well as aspirin..  3. Prophylaxis for cardiac events with perioperative beta-blockers: Not indicated the patient is already on metoprolol..  4. Invasive hemodynamic monitoring perioperatively: not indicated.  5. Deep vein thrombosis prophylaxis postoperatively:Not indicated..  6. Surveillance for postoperative MI with ECG immediately postoperatively and on postoperative days 1 and 2 AND troponin levels 24 hours  postoperatively and on day 4 or hospital discharge (whichever comes first): not indicated.  7. Other measures: None      Subjective:      Daniel Jacobs is a 72 y.o. male who presents to the office today for a preoperative consultation at the request of surgeon Dr Krishnamurthy  who plans on performing left-sided cataract removal on August 14. This consultation is requested for the specific conditions prompting preoperative evaluation (i.e. because of potential affect on operative risk): Cardiovascular risk stratification due to cardiovascular disease. Planned anesthesia: According to surgeon's discretion.. The patient has the following known anesthesia issues: No prior anesthetic complications. Patients bleeding risk: no remote history of abnormal bleeding.     Past Medical History:   Diagnosis Date     Low back pain      Prostate cancer 2007     Past Surgical History:   Procedure Laterality Date     HERNIA REPAIR       INSERT / REPLACE / REMOVE PROSTHESIS URETHRAL SPHINCTER N/A 6/23/2014    Procedure: ARTIFICIAL URINARY SPHINCTER INSERTION;  Surgeon: Torres Sanabria MD;  Location: [facility];  Service:      INSERT / REPLACE / REMOVE PROSTHESIS URETHRAL SPHINCTER N/A 2/16/2015    Procedure: REMOVAL/REPLACEMENT OF ARTIFICIAL URINARY SPHINCTER COMPONENT;  Surgeon: Torres Sanabria MD;  Location: [facility];  Service:      prostatetomy       Social History     Social History     Marital status:      Spouse name: N/A     Number of children: N/A     Years of education: N/A     Social History Main Topics     Smoking status: Never Smoker     Smokeless tobacco: Never Used     Alcohol use No     Drug use: No     Sexual activity: Not Asked     Other Topics Concern     None     Social History Narrative     Family History   Problem Relation Age of Onset     Prostate cancer Brother      Allergies   Allergen Reactions     Augmentin [Amoxicillin-Pot Clavulanate] Anaphylaxis     Codeine-Guaifenesin Anaphylaxis      "Has tolerated vicodin and percocet w/o issue.     Current Outpatient Prescriptions   Medication Sig Dispense Refill     aspirin 81 MG tablet Take 81 mg by mouth daily.       atorvastatin (LIPITOR) 80 MG tablet Take 1 tablet (80 mg total) by mouth bedtime. 90 tablet 3     lisinopril (PRINIVIL,ZESTRIL) 5 MG tablet Take 1 tablet (5 mg total) by mouth daily. 90 tablet 3     metoprolol tartrate (LOPRESSOR) 25 MG tablet Take 0.5 tablets (12.5 mg total) by mouth 2 (two) times a day. 90 tablet 3     nitroglycerin (NITROSTAT) 0.4 MG SL tablet Place 1 tablet (0.4 mg total) under the tongue every 5 (five) minutes as needed for chest pain. 90 tablet 0     OMEGA-3/DHA/EPA/FISH OIL (FISH OIL-OMEGA-3 FATTY ACIDS) 300-1,000 mg capsule Take 2 g by mouth daily.       ticagrelor (BRILINTA) 90 mg Tab Take 1 tablet (90 mg total) by mouth 2 (two) times a day. 180 tablet 3     No current facility-administered medications for this visit.          Review of Systems  Constitutional: negative for chills, fatigue and weight loss  Eyes: Normal except for the cataract  Ears, nose, mouth, throat, and face: negative  Respiratory: negative for dyspnea on exertion and wheezing  Cardiovascular: negative for exertional chest pressure/discomfort, fatigue, irregular heart beat and palpitations  Gastrointestinal: negative  Genitourinary:negative  Integument/breast: negative  Musculoskeletal:negative  Neurological: negative  Behavioral/Psych: negative  Endocrine: negative      Objective:      BP 92/50  Pulse 73  Temp 98  F (36.7  C) (Oral)   Ht 6' 0.5\" (1.842 m)  Wt 203 lb 12.8 oz (92.4 kg)  SpO2 96%  BMI 27.26 kg/m2  General appearance: alert, appears stated age, cooperative and no distress  Head: Normocephalic, without obvious abnormality  Ears: normal TM's and external ear canals both ears  Nose: Nares normal. Septum midline. Mucosa normal. No drainage or sinus tenderness.  Throat: lips, mucosa, and tongue normal; teeth and gums normal  Neck: no " adenopathy, no JVD, supple, symmetrical, trachea midline and thyroid not enlarged, symmetric, no tenderness/mass/nodules  Lungs: clear to auscultation bilaterally  Heart: regular rate and rhythm, S1, S2 normal, no murmur, click, rub or gallop  Abdomen: soft, non-tender; bowel sounds normal; no masses,  no organomegaly  Extremities: varicose veins noted and Otherwise normal.  Pulses: 2+ and symmetric  Lymph nodes: Cervical, supraclavicular, and axillary nodes normal.  Neurologic: Alert and oriented X 3, normal strength and tone. Normal symmetric reflexes. Normal coordination and gait  Psychiatric: He has normal mood and normal affect no signs of depression or anxiety.      Predictors of intubation difficulty:   Morbid obesity? no   Anatomically abnormal facies? no   Prominent incisors? no   Receding mandible? no   Short, thick neck? no   Neck range of motion: normal   Mallampati score: II (hard and soft palate, upper portion of tonsils anduvula visible)    Dentition: No chipped, loose, or missing teeth.    Cardiographics  Echocardiogram: See attached report.

## 2021-06-13 NOTE — PROGRESS NOTES
Assessment and Plan:   Patient has been advised of split billing requirements and indicates understanding: Yes  1. Routine general medical examination at a health care facility  - Comprehensive Metabolic Panel    2. Peripheral vascular disease (H)  He does not have any new symptoms regarding the peripheral vascular disease noted that he is doing well with that.  3. Encounter for screening for lipoid disorders  - Lipid Cascade    4. Malignant neoplasm of prostate (H)  - PSA, Diagnostic (Prostatic-Specific Antigen)    5. Need for hepatitis C screening test  - Hepatitis C Antibody (Anti-HCV)  Discuss his concerns including having continuing neck pain as well as right upper extremity numbness that was evaluated and thought to be carpal tunnel disease.  No longer doing chiropractor and feels that his neck and numbness is much better.  He will let me know if he continues to have any pain in the neck or numbness in the fingers we can consider having neck x-ray and possible referral to spine care physicians.  We will get his labs as noted.  Discussed cardiovascular disease.  Will inform of the lab results.      The patient's current medical problems were reviewed.    I have had an Advance Directives discussion with the patient.  The following high BMI interventions were performed this visit: encouragement to exercise and weight monitoring  The following health maintenance schedule was reviewed with the patient and provided in printed form in the after visit summary:   Health Maintenance   Topic Date Due     HEPATITIS C SCREENING  1945     ZOSTER VACCINES (1 of 2) 04/09/1997     MEDICARE ANNUAL WELLNESS VISIT  12/22/2021     FALL RISK ASSESSMENT  12/22/2021     LIPID  03/25/2024     ADVANCE CARE PLANNING  03/25/2024     COLORECTAL CANCER SCREENING  09/07/2028     TD 18+ HE  03/25/2029     Pneumococcal Vaccine: 65+ Years  Completed     INFLUENZA VACCINE RULE BASED  Completed     Pneumococcal Vaccine: Pediatrics (0 to  5 Years) and At-Risk Patients (6 to 64 Years)  Aged Out     HEPATITIS B VACCINES  Aged Out        Subjective:   Chief Complaint: Daniel Jacobs is an 75 y.o. male here for an Annual Wellness visit.   HPI: He is here for his routine physical exam.  His only concern is continuing upper extremity numbness noted more on the left side.  Has mild neck discomfort with it.  Has had evaluation for neck pain as well as EMG for possible carpal tunnel syndrome.  He is to go to a chiropractor.  Noted that he is a little better at this time.  Does not really want to do anything about it though he still has it intermittently.  He does continue to be active, has no chest pain or shortness of breath feels well.  He does have a history of prostate cancer which was managed but still has intermittent urinary incontinence but not well back.  He is doing well otherwise.    Review of Systems:    A full review of system was done and is as noted otherwise is negative.  Please see above.  The rest of the review of systems are negative for all systems.    Patient Care Team:  Christina Mims MD as PCP - General (Family Medicine)  Christina Mims MD as Assigned PCP     Patient Active Problem List   Diagnosis     Prostate Cancer     Prostate Cancer     Nephrolithiasis     Lower Back Pain     Urinary Incontinence     Past Medical History:   Diagnosis Date     Low back pain      Prostate cancer (H) 2007      Past Surgical History:   Procedure Laterality Date     HERNIA REPAIR       INSERT / REPLACE / REMOVE PROSTHESIS URETHRAL SPHINCTER N/A 6/23/2014    Procedure: ARTIFICIAL URINARY SPHINCTER INSERTION;  Surgeon: Torres Sanabria MD;  Location: [facility] OR;  Service:      INSERT / REPLACE / REMOVE PROSTHESIS URETHRAL SPHINCTER N/A 2/16/2015    Procedure: REMOVAL/REPLACEMENT OF ARTIFICIAL URINARY SPHINCTER COMPONENT;  Surgeon: Torres Sanabria MD;  Location: [facility] OR;  Service:      LUMBAR SPINE SURGERY   1981     prostatetomy        Family History   Problem Relation Age of Onset     Prostate cancer Brother       Social History     Socioeconomic History     Marital status:      Spouse name: Not on file     Number of children: Not on file     Years of education: Not on file     Highest education level: Not on file   Occupational History     Not on file   Social Needs     Financial resource strain: Not on file     Food insecurity     Worry: Not on file     Inability: Not on file     Transportation needs     Medical: Not on file     Non-medical: Not on file   Tobacco Use     Smoking status: Never Smoker     Smokeless tobacco: Never Used   Substance and Sexual Activity     Alcohol use: No     Drug use: No     Sexual activity: Not on file   Lifestyle     Physical activity     Days per week: Not on file     Minutes per session: Not on file     Stress: Not on file   Relationships     Social connections     Talks on phone: Not on file     Gets together: Not on file     Attends [employer] service: Not on file     Active member of club or organization: Not on file     Attends meetings of clubs or organizations: Not on file     Relationship status: Not on file     Intimate partner violence     Fear of current or ex partner: Not on file     Emotionally abused: Not on file     Physically abused: Not on file     Forced sexual activity: Not on file   Other Topics Concern     Not on file   Social History Narrative     Not on file      Current Outpatient Medications   Medication Sig Dispense Refill     aspirin 81 MG tablet Take 81 mg by mouth daily.       atorvastatin (LIPITOR) 40 MG tablet TAKE 1 TABLET DAILY 90 tablet 3     ketoconazole (NIZORAL) 2 % cream Apply 1 Application topically to affected areas twice a day for two weeks, then reduce to 1 time per week.       LUTEIN EXTRACT-ZEAXANTHIN EXT ORAL Take 1 tablet by mouth daily.       metoprolol tartrate (LOPRESSOR) 25 MG tablet TAKE ONE-HALF (1/2) TABLET TWICE A DAY 90  tablet 3     nitroglycerin (NITROSTAT) 0.4 MG SL tablet Place 1 tablet (0.4 mg total) under the tongue every 5 (five) minutes as needed for chest pain. 90 tablet 3     OMEGA-3/DHA/EPA/FISH OIL (FISH OIL-OMEGA-3 FATTY ACIDS) 300-1,000 mg capsule Take 2 g by mouth daily.       azithromycin (ZITHROMAX) 250 MG tablet Take 500 mg (2 x 250 mg tablets) on day 1 followed by 250 mg (1 tablet) on days 2-5. 6 tablet 0     No current facility-administered medications for this visit.       Objective:   Vital Signs:   Visit Vitals  /80   Pulse 76   Temp 97.6  F (36.4  C) (Oral)   Ht 6' (1.829 m)   Wt (!) 227 lb 12.8 oz (103.3 kg)   BMI 30.90 kg/m           VisionScreening:  No exam data present     Physical Exam:  General Appearance: Alert, cooperative, no distress, appears stated age  Head: Normocephalic, without obvious abnormality, atraumatic  Eyes: PERRL, conjunctiva/corneas clear, EOM's intact  Ears: Normal TM's and external ear canals, both ears  Nose: Nares normal, septum midline,mucosa normal, no drainage  Throat: Lips, mucosa, and tongue normal; teeth and gums normal  Neck: Supple, symmetrical, trachea midline, no adenopathy;  thyroid: not enlarged, symmetric, no tenderness/mass/nodules; no carotid bruit or JVD  Back: Symmetric, no curvature, ROM normal, no CVA tenderness  Lungs: Clear to auscultation bilaterally, respirations unlabored  Heart: Regular rate and rhythm, S1 and S2 normal, no murmur, rub, or gallop,  Abdomen: Soft, non-tender, bowel sounds active all four quadrants,  no masses, no organomegaly  Musculoskeletal: Normal range of motion. No joint swelling or deformity.   Extremities: Extremities normal, atraumatic, no cyanosis or edema  Skin: Skin color, texture, turgor normal, no rashes or lesions  Lymph nodes: Cervical, supraclavicular, and axillary nodes normal  Neurologic: He is alert. He has normal reflexes.   Psychiatric: He has a normal mood and affect.       Assessment Results 12/22/2020    Activities of Daily Living No help needed   Instrumental Activities of Daily Living No help needed   Get Up and Go Score Less than 12 seconds   Mini Cog Total Score 5   Some recent data might be hidden     A Mini-Cog score of 0-2 suggests the possibility of dementia, score of 3-5 suggests no dementia    Identified Health Risks:     He is at risk for lack of exercise and has been provided with information to increase physical activity for the benefit of his well-being.  The patient was counseled and encouraged to consider modifying their diet and eating habits. He was provided with information on recommended healthy diet options.  The patient was provided with written information regarding signs of hearing loss.  Information on urinary incontinence and treatment options given to patient.  He is at risk for falling and has been provided with information to reduce the risk of falling at home.  Information regarding advance directives (living [place]), including where he can download the appropriate form, was provided to the patient via the AVS.

## 2021-06-14 NOTE — PROGRESS NOTES
ASSESSMENT:  1. Post-nasal drip  - azithromycin (ZITHROMAX) 250 MG tablet; Take 500 mg (2 x 250 mg tablets) on day 1 followed by 250 mg (1 tablet) on days 2-5.  Dispense: 6 tablet; Refill: 0    2. Nasal bleeding  - Ambulatory referral to ENT    3. S/P coronary artery stent placement      PLAN:  Been that the bleeding is coming from the same side each time I think it would be a necessity to have him see the ENT for a scope.  I did try to look down nasal speculum but was not able to get down deep enough.  He has been given a referral to see the ENT physicians.  I have also decided to give him antibiotics hopefully to help with inflammation and infection which I think is more of sinusitis.  He does seem to have possibility of allergy though he has known diagnosis of that.  I did encourage him to continue with the Brilinta but to call the cardiologist to see if there is any additional management for the bleeding.  I did encourage him that he needs to continue on that medication because of the fact that he has a stent.    Orders Placed This Encounter   Procedures     Ambulatory referral to ENT     Referral Priority:   Routine     Referral Type:   Consultation     Referral Reason:   Evaluation and Treatment     Requested Specialty:   Otolaryngology     Number of Visits Requested:   1     There are no discontinued medications.    No Follow-up on file.      CHIEF COMPLAINT:  Chief Complaint   Patient presents with     Epistaxis     gets nose bleeds occasionally since was put on blood thinner in Feb- yesterday morning awoke with unstopable nosebleedd     Cough     nasal congestion started Monday- had ST one evening     Medication Questions     didn't take Brilinta last night, disucss medications due to nosebleeds       HISTORY OF PRESENT ILLNESS:  Daniel is a 72 y.o. male with history of previous myocardial infarction managed with stent placement and subsequent prescription with anticoagulant.  He is thinking Brilinta  currently and takes about 90 mg twice a day.  He comes in because he has a history of epistaxis that happened yesterday in the evening.  Noted that it took him a lot of time to stop the bleeding.  He has had previous episodes of epistaxis but noted that this was prolonged than previous.  Noted that the bleeding has always been from the right nostril with very few on the left.  Though he has had nosebleeds prior to being on the entire it seems that it is worse and frequent currently.  He comes in to discuss possibility of reducing the dose of the medicine.  He did note that finally he was able to stop the bleeding.  He has also had some cough and nasal congestion that started about 5 days ago.  Noted sore throat that is now improved.  Denied any ear pain and does not have any headaches.  Cough is said to be productive, worse on laying on his back.  Denied having any wheeze.  Or difficulty breathing.  Does have mild phlegm from his nose.  Feels slightly fatigued because he did not sleep well yesterday night.      REVIEW OF SYSTEMS:   Denied having any fevers, no chills.  Mild headaches.  Denied any dizziness and no ringing or noise in his ears.  He does not have any chest pain or shortness of breath.  No diaphoresis.  Denied any abdominal pain or gastrointestinal symptoms.  Does not have any pain to his nose.  All other systems are negative.    PFSH:  Reviewed, as below.    History   Smoking Status     Never Smoker   Smokeless Tobacco     Never Used       Family History   Problem Relation Age of Onset     Prostate cancer Brother        Social History     Social History     Marital status:      Spouse name: N/A     Number of children: N/A     Years of education: N/A     Occupational History     Not on file.     Social History Main Topics     Smoking status: Never Smoker     Smokeless tobacco: Never Used     Alcohol use No     Drug use: No     Sexual activity: Not on file     Other Topics Concern     Not on file      Social History Narrative       Past Surgical History:   Procedure Laterality Date     HERNIA REPAIR       INSERT / REPLACE / REMOVE PROSTHESIS URETHRAL SPHINCTER N/A 6/23/2014    Procedure: ARTIFICIAL URINARY SPHINCTER INSERTION;  Surgeon: Torres Sanabria MD;  Location: [facility];  Service:      INSERT / REPLACE / REMOVE PROSTHESIS URETHRAL SPHINCTER N/A 2/16/2015    Procedure: REMOVAL/REPLACEMENT OF ARTIFICIAL URINARY SPHINCTER COMPONENT;  Surgeon: Torres Sanabria MD;  Location: [facility];  Service:      prostatetomy         Allergies   Allergen Reactions     Augmentin [Amoxicillin-Pot Clavulanate] Anaphylaxis     Codeine-Guaifenesin Anaphylaxis     Has tolerated vicodin and percocet w/o issue.       Active Ambulatory Problems     Diagnosis Date Noted     Prostate Cancer      Prostate Cancer      Nephrolithiasis      Lower Back Pain      Urinary Incontinence      Resolved Ambulatory Problems     Diagnosis Date Noted     No Resolved Ambulatory Problems     Past Medical History:   Diagnosis Date     Low back pain      Prostate cancer 2007       Current Outpatient Prescriptions   Medication Sig Dispense Refill     aspirin 81 MG tablet Take 81 mg by mouth daily.       atorvastatin (LIPITOR) 40 MG tablet Take 40 mg by mouth.       metoprolol tartrate (LOPRESSOR) 25 MG tablet Take 0.5 tablets (12.5 mg total) by mouth 2 (two) times a day. 90 tablet 3     nitroglycerin (NITROSTAT) 0.4 MG SL tablet Place 1 tablet (0.4 mg total) under the tongue every 5 (five) minutes as needed for chest pain. 90 tablet 0     OMEGA-3/DHA/EPA/FISH OIL (FISH OIL-OMEGA-3 FATTY ACIDS) 300-1,000 mg capsule Take 2 g by mouth daily.       ticagrelor (BRILINTA) 90 mg Tab Take 1 tablet (90 mg total) by mouth 2 (two) times a day. 180 tablet 3     atorvastatin (LIPITOR) 80 MG tablet Take 1 tablet (80 mg total) by mouth bedtime. 90 tablet 3     azithromycin (ZITHROMAX) 250 MG tablet Take 500 mg (2 x 250 mg tablets) on day 1  followed by 250 mg (1 tablet) on days 2-5. 6 tablet 0     lisinopril (PRINIVIL,ZESTRIL) 5 MG tablet Take 1 tablet (5 mg total) by mouth daily. 90 tablet 3     No current facility-administered medications for this visit.        VITALS:  Vitals:    11/17/17 1118   BP: 98/60   Pulse: 76   Temp: 98  F (36.7  C)   Weight: 204 lb (92.5 kg)     Wt Readings from Last 3 Encounters:   11/17/17 204 lb (92.5 kg)   07/21/17 203 lb 12.8 oz (92.4 kg)   02/07/17 211 lb 4.8 oz (95.8 kg)     Body mass index is 27.29 kg/(m^2).    PHYSICAL EXAM:  General Appearance: Alert, cooperative, no distress, appears stated age, has constant need to blow his nose.  He is afebrile and he is not pale.  He is in no respiratory distress.  HEENT: Pupils are equal and reactive, extraocular motions is normal.  Oropharynx is moist.  Neck is supple no notable thyromegaly.  External ears are normal.  Tympanic membrane is normal.  I did look into his nose and there is injected turbinates slightly swollen and he appears to have an area on his right nose ( though I was not able to see it well) that looks like a scab.  There is no active bleeding at this point.  Sinuses are nontender.  Lungs: Clear to auscultation bilaterally, respirations unlabored  Heart: Regular rate and rhythm, S1 and S2 normal, no murmur, rub, or gallop  Abdomen: Soft  Musculoskeletal: Normal range of motion. No joint swelling or deformity.   Neurologic:  Alert and oriented times 3. Normal reflexes. Cranial nerves II-XII intact.   Psychiatric: Normal mood and affect.    MEDICATIONS:  Current Outpatient Prescriptions   Medication Sig Dispense Refill     aspirin 81 MG tablet Take 81 mg by mouth daily.       atorvastatin (LIPITOR) 40 MG tablet Take 40 mg by mouth.       metoprolol tartrate (LOPRESSOR) 25 MG tablet Take 0.5 tablets (12.5 mg total) by mouth 2 (two) times a day. 90 tablet 3     nitroglycerin (NITROSTAT) 0.4 MG SL tablet Place 1 tablet (0.4 mg total) under the tongue every 5  (five) minutes as needed for chest pain. 90 tablet 0     OMEGA-3/DHA/EPA/FISH OIL (FISH OIL-OMEGA-3 FATTY ACIDS) 300-1,000 mg capsule Take 2 g by mouth daily.       ticagrelor (BRILINTA) 90 mg Tab Take 1 tablet (90 mg total) by mouth 2 (two) times a day. 180 tablet 3     atorvastatin (LIPITOR) 80 MG tablet Take 1 tablet (80 mg total) by mouth bedtime. 90 tablet 3     azithromycin (ZITHROMAX) 250 MG tablet Take 500 mg (2 x 250 mg tablets) on day 1 followed by 250 mg (1 tablet) on days 2-5. 6 tablet 0     lisinopril (PRINIVIL,ZESTRIL) 5 MG tablet Take 1 tablet (5 mg total) by mouth daily. 90 tablet 3     No current facility-administered medications for this visit.

## 2021-06-16 PROBLEM — I73.9 PERIPHERAL VASCULAR DISEASE (H): Status: ACTIVE | Noted: 2020-12-22

## 2021-06-16 NOTE — TELEPHONE ENCOUNTER
Refill Approved    Rx renewed per Medication Renewal Policy. Medication was last renewed on 4/20/20.    Leobardo Deleon, [facility] Triage/Med Refill 4/13/2021     Requested Prescriptions   Pending Prescriptions Disp Refills     atorvastatin (LIPITOR) 40 MG tablet [Pharmacy Med Name: ATORVASTATIN TABS 40MG] 90 tablet 3     Sig: TAKE 1 TABLET DAILY       Statins Refill Protocol (Hmg CoA Reductase Inhibitors) Passed - 4/11/2021 11:12 PM        Passed - PCP or prescribing provider visit in past 12 months      Last office visit with prescriber/PCP: 4/9/2021 Christina Mims MD OR same dept: 4/9/2021 Christina Mims MD OR same specialty: 4/9/2021 Christina Mims MD  Last physical: 12/22/2020 Last MTM visit: Visit date not found   Next visit within 3 mo: Visit date not found  Next physical within 3 mo: Visit date not found  Prescriber OR PCP: Christina Mims MD  Last diagnosis associated with med order: 1. Encounter for screening for lipoid disorders  - atorvastatin (LIPITOR) 40 MG tablet [Pharmacy Med Name: ATORVASTATIN TABS 40MG]; TAKE 1 TABLET DAILY  Dispense: 90 tablet; Refill: 3    If protocol passes may refill for 12 months if within 3 months of last provider visit (or a total of 15 months).

## 2021-06-16 NOTE — PROGRESS NOTES
Assessment & Plan     Achilles tendinitis, right leg  - Ankle/Foot DME: Ankle Brace; Right  - Ambulatory referral to Orthopedics    Gastroesophageal reflux disease without esophagitis  - omeprazole (PRILOSEC) 20 MG capsule; Take 1 capsule (20 mg total) by mouth daily before breakfast.    Peripheral vascular disease (H)  I think that he does have some tendinitis on the Achilles tendon itself.  Diet will be a good idea to have it managed to avoid tendon rupture.  Because of that I have referred him to orthopedic doctors for that.  I also think that the cause of the acid taste in his mouth is a reflux.  We will start him on omeprazole at this time and will have him follow-up as needed.    No follow-ups on file.    Christina Mims MD  [facility]   Daniel Jacobs is 76 y.o. and presents today for the following health issues   Ankle Pain   The incident occurred more than 1 week ago (Sudden onset of ankle pain 3 to 4 weeks ago.  This is associated with swelling noted on the right Achilles tendon which is painful..  He has no injuries.  Noted wearing sympathomimetic it will be painful.). There was no injury mechanism. The pain is present in the right ankle. The quality of the pain is described as aching. The pain is moderate. The pain has been constant since onset. Pertinent negatives include no loss of motion, muscle weakness or numbness. The symptoms are aggravated by movement and palpation. He has tried nothing for the symptoms.   He is also complaining of having a taste of acid in his throat and chest mostly in the evening.  He noted no pain, and noted that he will not laying down when that happens.  This happens about 7 or 8 in the evenings.  He has no nausea no vomiting and has had no prior history of reflux.      Past Medical History:   Diagnosis Date     Low back pain      Prostate cancer (H) 2007     Past Surgical History:   Procedure Laterality Date      HERNIA REPAIR       INSERT / REPLACE / REMOVE PROSTHESIS URETHRAL SPHINCTER N/A 6/23/2014    Procedure: ARTIFICIAL URINARY SPHINCTER INSERTION;  Surgeon: Torres Sanabria MD;  Location: [facility];  Service:      INSERT / REPLACE / REMOVE PROSTHESIS URETHRAL SPHINCTER N/A 2/16/2015    Procedure: REMOVAL/REPLACEMENT OF ARTIFICIAL URINARY SPHINCTER COMPONENT;  Surgeon: Torres Sanabria MD;  Location: [facility];  Service:      LUMBAR SPINE SURGERY  1981     prostatetomy       Social History     Socioeconomic History     Marital status:      Spouse name: None     Number of children: None     Years of education: None     Highest education level: None   Occupational History     None   Social Needs     Financial resource strain: None     Food insecurity     Worry: None     Inability: None     Transportation needs     Medical: None     Non-medical: None   Tobacco Use     Smoking status: Never Smoker     Smokeless tobacco: Never Used   Substance and Sexual Activity     Alcohol use: No     Drug use: No     Sexual activity: None   Lifestyle     Physical activity     Days per week: None     Minutes per session: None     Stress: None   Relationships     Social connections     Talks on phone: None     Gets together: None     Attends Yarsani service: None     Active member of club or organization: None     Attends meetings of clubs or organizations: None     Relationship status: None     Intimate partner violence     Fear of current or ex partner: None     Emotionally abused: None     Physically abused: None     Forced sexual activity: None   Other Topics Concern     None   Social History Narrative     None     Family History   Problem Relation Age of Onset     Prostate cancer Brother      Allergies   Allergen Reactions     Augmentin [Amoxicillin-Pot Clavulanate] Anaphylaxis     Benzocaine      Other reaction(s): Throat Swelling/Closing  Throat swelling     Codeine-Guaifenesin Anaphylaxis     Has  tolerated vicodin and percocet w/o issue.     Current Outpatient Medications   Medication Sig Dispense Refill     aspirin 81 MG tablet Take 81 mg by mouth daily.       atorvastatin (LIPITOR) 40 MG tablet TAKE 1 TABLET DAILY 90 tablet 3     ketoconazole (NIZORAL) 2 % cream Apply 1 Application topically to affected areas twice a day for two weeks, then reduce to 1 time per week.       LUTEIN EXTRACT-ZEAXANTHIN EXT ORAL Take 1 tablet by mouth daily.       metoprolol tartrate (LOPRESSOR) 25 MG tablet TAKE ONE-HALF (1/2) TABLET TWICE A DAY 90 tablet 3     nitroglycerin (NITROSTAT) 0.4 MG SL tablet Place 1 tablet (0.4 mg total) under the tongue every 5 (five) minutes as needed for chest pain. 90 tablet 3     OMEGA-3/DHA/EPA/FISH OIL (FISH OIL-OMEGA-3 FATTY ACIDS) 300-1,000 mg capsule Take 2 g by mouth daily.       omeprazole (PRILOSEC) 20 MG capsule Take 1 capsule (20 mg total) by mouth daily before breakfast. 30 capsule 0     No current facility-administered medications for this visit.          Review of Systems   Constitutional: Negative for activity change, appetite change and fatigue.   Cardiovascular: Negative.    Gastrointestinal: Negative.    Musculoskeletal: Negative for joint swelling, myalgias and neck stiffness.   Neurological: Negative for weakness and numbness.           Objective    /60 (Patient Site: Left Arm, Patient Position: Sitting, Cuff Size: Adult Large)   Pulse 70   Wt (!) 233 lb 8 oz (105.9 kg)   SpO2 99%   BMI 31.67 kg/m    Body mass index is 31.67 kg/m .  Physical Exam   Constitutional: He appears well-developed and well-nourished.   Eyes: Pupils are equal, round, and reactive to light.   Neck: Normal range of motion. Neck supple.   Cardiovascular: Normal rate and regular rhythm.   Pulmonary/Chest: Effort normal.   Abdominal: Soft. He exhibits no distension. There is no abdominal tenderness.   Musculoskeletal:      Comments: There is a mild swelling noted on the posterior aspect of the  right Achilles tendon which is mildly tender to touch.   Neurological: He is alert.

## 2021-06-17 NOTE — PATIENT INSTRUCTIONS - HE
Patient Instructions by Christina Mims MD at 3/25/2019 10:40 AM     Author: Christina Mims MD Service: -- Author Type: Physician    Filed: 3/25/2019 11:14 AM Encounter Date: 3/25/2019 Status: Signed    : Christina Mims MD (Physician)         Patient Education     Exercise for a Healthier Heart  You may wonder how you can improve the health of your heart. If youre thinking about exercise, youre on the right track. You dont need to become an athlete, but you do need a certain amount of brisk exercise to help strengthen your heart. If you have been diagnosed with a heart condition, your doctor may recommend exercise to help stabilize your condition. To help make exercise a habit, choose safe, fun activities.       Be sure to check with your health care provider before starting an exercise program.    Why exercise?  Exercising regularly offers many healthy rewards. It can help you do all of the following:    Improve your blood cholesterol levels to help prevent further heart trouble    Lower your blood pressure to help prevent a stroke or heart attack    Control diabetes, or reduce your risk of getting this disease    Improve your heart and lung function    Reach and maintain a healthy weight    Make your muscles stronger and more limber so you can stay active    Prevent falls and fractures by slowing the loss of bone mass (osteoporosis)    Manage stress better  Exercise tips  Ease into your routine. Set small goals. Then build on them.  Exercise on most days. Aim for a total of 150 or more minutes of moderate to  vigorous intensity activity each week. Consider 40 minutes, 3 to 4 times a week. For best results, activity should last for 40 minutes on average. It is OK to work up to the 40 minute period over time. Examples of moderate-intensity activity is walking one mile in 15 minutes or 30 to 45 minutes of yard work.  Step up your daily activity level. Along with your  exercise program, try being more active throughout the day. Walk instead of drive. Do more household tasks or yard work.  Choose one or more activities you enjoy. Walking is one of the easiest things you can do. You can also try swimming, riding a bike, or taking an exercise class.  Stop exercising and call your doctor if you:    Have chest pain or feel dizzy or lightheaded    Feel burning, tightness, pressure, or heaviness in your chest, neck, shoulders, back, or arms    Have unusual shortness of breath    Have increased joint or muscle pain    Have palpitations or an irregular heartbeat      5356-9138 [employer]. [address] [place] [address]. All rights reserved. This information is not intended as a substitute for professional medical care. Always follow your healthcare professional's instructions.         Patient Education   Understanding [employer] MyPlate  The USDA (US Department of Agriculture) has guidelines to help you make healthy food choices. These are called MyPlate. MyPlate shows the food groups that make up healthy meals using the image of a place setting. Before you eat, think about the healthiest choices for what to put onto your plate or into your cup or bowl. To learn more about building a healthy plate, visit www.choosemyplate.gov.       The Food Groups    Fruits: Any fruit or 100% fruit juice counts as part of the Fruit Group. Fruits may be fresh, canned, frozen, or dried, and may be whole, cut-up, or pureed. Make half your plate fruits and vegetables.    Vegetables: Any vegetable or 100% vegetable juice counts as a member of the Vegetable Group. Vegetables may be fresh, frozen, canned, or dried. They can be served raw or cooked and may be whole, cut-up, or mashed. Make half your plate fruits and vegetables.     Grains: All foods made from grains are part of the Grains Group. These include wheat, rice, oats, cornmeal, and barley such as bread, pasta, oatmeal, cereal,  tortillas, and grits. Grains should be no more than a quarter of your plate. At least half of your grains should be whole grains.    Protein: This group includes meat, poultry, seafood, beans and peas, eggs, processed soy products (like tofu), nuts (including nut butters), and seeds. Make protein choices no more than a quarter of your plate. Meat and poultry choices should be lean or low fat.    Dairy: All fluid milk products and foods made from milk that contain calcium, like yogurt and cheese are part of the Dairy Group. (Foods that have little calcium, such as cream, butter, and cream cheese, are not part of the group.) Most dairy choices should be low-fat or fat-free.    Oils: These are fats that are liquid at room temperature. They include canola, corn, olive, soybean, and sunflower oil. Foods that are mainly oil include mayonnaise, certain salad dressings, and soft margarines. You should have only 5 to 7 teaspoons of oils a day. You probably already get this much from the food you eat.  Use [employer] to Help Build Your Meals  The [employer] can help you plan and track your meals and activity. You can look up individual foods to see or compare their nutritional value. You can get guidelines for what and how much you should eat. You can compare your food choices. And you can assess personal physical activities and see ways you can improve. Go to www.[employer].gov/supertracker/.    9233-6423 [employer]. [address] [place] [address], [place] [address]. All rights reserved. This information is not intended as a substitute for professional medical care. Always follow your healthcare professional's instructions.           Patient Education   Urinary Incontinence (Male)    Urinary incontinence means not being able to control the release of urine from the bladder.  Causes  Common causes of urinary incontinence in men include:    Infection    Certain medicines    Aging    Poor pelvic muscle  tone    Bladder spasms    Obesity    Urinary retention  Nervous system diseases, diabetes, sleep apnea, urinary tract infections, prostate surgery, and pelvic trauma can also cause incontinence. Constipation and smoking have also been identified as risk factors.  Symptoms    Urge incontinence (also called overactive bladder) is a sudden urge to urinate even though there may not be much urine in the bladder. The need to urinate often during the night is common. It is due to bladder spasms.    Stress incontinence is involuntary urine leakage that can occur with sneezing, coughing, and other actions that put stress on the bladder.  Treatment  Treatment of urinary incontinence depends on the cause. Infections of the bladder are treated with antibiotics. Urinary retention is treated with a bladder catheter.  Home care  Follow these guidelines when caring for yourself at home:    Don't consume foods and drinks that may irritate the bladder. These include drinks containing alcohol, caffeinate, or carbonation; chocolate; and acidic fruits and juices.    Limit fluid intake to 6 to 8 cups a day.    Lose weight if you are overweight. This will reduce your symptoms.    If needed, wear absorbent pads to catch urine. Change pads frequently to maintain hygiene and prevent skin and bladder infections.    Bathe daily to maintain good hygiene.    If an antibiotic was prescribed to treat a bladder infection, be sure to take it until finished, even if you are feeling better before then. This is to make sure your infection has cleared.    If a catheter was left in place, it is important to keep bacteria from getting into the collection bag. Don't disconnect the catheter from the collection bag.    Use a leg band to secure the catheter drainage tube, so it does not pull on the catheter. Drain the collection bag when it becomes full using the drain spout at the bottom of the bag. Don't disconnect the bag from the catheter.    Don't pull  on or try to remove a catheter. The catheter must be removed by a healthcare provider.  Follow-up care  Follow up with your healthcare provider, or as advised.  When to seek medical advice  Call your healthcare provider right away if any of these occur:    Fever over 100.4 F (38 C), or as directed by your healthcare provider    Bladder pain or fullness    Abdominal swelling, nausea, or vomiting    Back pain    Weakness, dizziness, or fainting    If a catheter was left in place, return if:  ? Catheter falls out  ? Catheter stops draining for 6 hours  Date Last Reviewed: 10/1/2017    [id number] [employer]. [address] [place] [address]. All rights reserved. This information is not intended as a substitute for professional medical care. Always follow your healthcare professional's instructions.     Patient Education   Understanding Advance Care Planning  Advance care planning is the process of deciding ones own future medical care. It helps ensure that if you cant speak for yourself, your wishes can still be carried out. The plan is a series of legal documents that note a persons wishes. The documents vary by state. Advance care planning may be done when a person has a serious illness that is expected to get worse. It may be done before major surgery. And it can help you and your family be prepared in case of a major illness or injury. Advance care planning helps with making decisions at these times.       A health care proxy is a person who acts as the voice of a patient when the patient cant speak for himself or herself. The name of this role varies by state. It may be called a Durable Medical Power of  or Durable Power of  for Healthcare. It may be called an agent, surrogate, or advocate. Or it may be called a representative or decision maker. It is an official duty that is identified by a legal document. The document also varies by state.    Why Is Advance Care Planning  Important?  If a person communicates their healthcare wishes:    They will be given medical care that matches their values and goals.    Their family members will not be forced to make decisions in a crisis with no guidance.  Creating a Plan  Making an advance care plan is often done in 3 steps:    Thinking about ones wishes. To create an advance care plan, you should think about what kind of medical treatment you would want if you lose the ability to communicate. Are there any situations in which you would refuse or stop treatment? Are there therapies you would want or not want? And whom do you want to make decisions for you? There are many places to learn more about how to plan for your care. Ask your doctor or  for resources.    Picking a health care proxy. This means choosing a trusted person to speak for you only when you cant speak for yourself. When you cannot make medical decisions, your proxy makes sure the instructions in your advance care plan are followed. A proxy does not make decisions based on his or her own opinions. They must put aside those opinions and values if needed, and carry out your wishes.    Filling out the legal documents. There are several kinds of legal documents for advance care planning. Each one tells health care providers your wishes. The documents may vary by state. They must be signed and may need to be witnessed or notarized. You can cancel or change them whenever you wish. Depending on your state, the documents may include a Healthcare Proxy form, Living Will, Durable Medical Power of , Advance Directive, or others.  The Familys Role  The best help a family can give is to support their loved ones wishes. Open and honest communication is vital. Family should express any concerns they have about the patients choices while the patient can still make decisions.    5990-4279 [employer]. [address] [place] [address], [place] [address]. All rights  reserved. This information is not intended as a substitute for professional medical care. Always follow your healthcare professional's instructions.         Also, [facility] offers a free, downloadable health care directive that allows you to share your treatment choices and personal preferences if you cannot communicate your wishes. It also allows you to appoint another person (called a health care agent) to make health care decisions if you are unable to do so. You can download an advance directive by going here: [email or web address]     Patient Education   Personalized Prevention Plan  You are due for the preventive services outlined below.  Your care team is available to assist you in scheduling these services.  If you have already completed any of these items, please share that information with your care team to update in your medical record.  Health Maintenance   Topic Date Due   ? ZOSTER VACCINES (1 of 2) 04/09/1997   ? ADVANCE DIRECTIVES DISCUSSED WITH PATIENT  02/08/2012   ? PNEUMOCOCCAL CONJUGATE VACCINE FOR ADULTS (PCV13 OR PREVNAR)  04/09/2012   ? INFLUENZA VACCINE RULE BASED (1) 08/01/2018   ? TD 18+ HE  09/11/2018   ? FALL RISK ASSESSMENT  10/23/2019   ? COLONOSCOPY  09/07/2023   ? PNEUMOCOCCAL POLYSACCHARIDE VACCINE AGE 65 AND OVER  Completed

## 2021-06-18 ENCOUNTER — OFFICE VISIT - HEALTHEAST (OUTPATIENT)
Dept: FAMILY MEDICINE | Facility: CLINIC | Age: 76
End: 2021-06-18

## 2021-06-18 DIAGNOSIS — I83.93 ASYMPTOMATIC VARICOSE VEINS OF BOTH LOWER EXTREMITIES: ICD-10-CM

## 2021-06-18 DIAGNOSIS — R60.0 EDEMA LEG: ICD-10-CM

## 2021-06-18 DIAGNOSIS — Z95.5 S/P CORONARY ARTERY STENT PLACEMENT: ICD-10-CM

## 2021-06-18 RX ORDER — NITROGLYCERIN 0.4 MG/1
0.4 TABLET SUBLINGUAL EVERY 5 MIN PRN
Qty: 20 TABLET | Refills: 3 | Status: SHIPPED | OUTPATIENT
Start: 2021-06-18 | End: 2021-12-29

## 2021-06-18 NOTE — PATIENT INSTRUCTIONS - HE
Patient Instructions by Christina Mims MD at 12/22/2020 11:00 AM     Author: Christina Mims MD Service: -- Author Type: Physician    Filed: 12/22/2020 11:28 AM Encounter Date: 12/22/2020 Status: Signed    : Christina Mims MD (Physician)         Patient Education     Exercise for a Healthier Heart  You may wonder how you can improve the health of your heart. If youre thinking about exercise, youre on the right track. You dont need to become an athlete, but you do need a certain amount of brisk exercise to help strengthen your heart. If you have been diagnosed with a heart condition, your doctor may recommend exercise to help stabilize your condition. To help make exercise a habit, choose safe, fun activities.       Be sure to check with your health care provider before starting an exercise program.    Why exercise?  Exercising regularly offers many healthy rewards. It can help you do all of the following:    Improve your blood cholesterol levels to help prevent further heart trouble    Lower your blood pressure to help prevent a stroke or heart attack    Control diabetes, or reduce your risk of getting this disease    Improve your heart and lung function    Reach and maintain a healthy weight    Make your muscles stronger and more limber so you can stay active    Prevent falls and fractures by slowing the loss of bone mass (osteoporosis)    Manage stress better  Exercise tips  Ease into your routine. Set small goals. Then build on them.  Exercise on most days. Aim for a total of 150 or more minutes of moderate to  vigorous intensity activity each week. Consider 40 minutes, 3 to 4 times a week. For best results, activity should last for 40 minutes on average. It is OK to work up to the 40 minute period over time. Examples of moderate-intensity activity is walking one mile in 15 minutes or 30 to 45 minutes of yard work.  Step up your daily activity level. Along with your  exercise program, try being more active throughout the day. Walk instead of drive. Do more household tasks or yard work.  Choose one or more activities you enjoy. Walking is one of the easiest things you can do. You can also try swimming, riding a bike, or taking an exercise class.  Stop exercising and call your doctor if you:    Have chest pain or feel dizzy or lightheaded    Feel burning, tightness, pressure, or heaviness in your chest, neck, shoulders, back, or arms    Have unusual shortness of breath    Have increased joint or muscle pain    Have palpitations or an irregular heartbeat      8203-5280 [employer]. [address] [place] [address]. All rights reserved. This information is not intended as a substitute for professional medical care. Always follow your healthcare professional's instructions.         Patient Education   Understanding [employer] MyPlate  The USDA (US Department of Agriculture) has guidelines to help you make healthy food choices. These are called MyPlate. MyPlate shows the food groups that make up healthy meals using the image of a place setting. Before you eat, think about the healthiest choices for what to put onto your plate or into your cup or bowl. To learn more about building a healthy plate, visit www.choosemyplate.gov.       The Food Groups    Fruits: Any fruit or 100% fruit juice counts as part of the Fruit Group. Fruits may be fresh, canned, frozen, or dried, and may be whole, cut-up, or pureed. Make half your plate fruits and vegetables.    Vegetables: Any vegetable or 100% vegetable juice counts as a member of the Vegetable Group. Vegetables may be fresh, frozen, canned, or dried. They can be served raw or cooked and may be whole, cut-up, or mashed. Make half your plate fruits and vegetables.     Grains: All foods made from grains are part of the Grains Group. These include wheat, rice, oats, cornmeal, and barley such as bread, pasta, oatmeal, cereal,  tortillas, and grits. Grains should be no more than a quarter of your plate. At least half of your grains should be whole grains.    Protein: This group includes meat, poultry, seafood, beans and peas, eggs, processed soy products (like tofu), nuts (including nut butters), and seeds. Make protein choices no more than a quarter of your plate. Meat and poultry choices should be lean or low fat.    Dairy: All fluid milk products and foods made from milk that contain calcium, like yogurt and cheese are part of the Dairy Group. (Foods that have little calcium, such as cream, butter, and cream cheese, are not part of the group.) Most dairy choices should be low-fat or fat-free.    Oils: These are fats that are liquid at room temperature. They include canola, corn, olive, soybean, and sunflower oil. Foods that are mainly oil include mayonnaise, certain salad dressings, and soft margarines. You should have only 5 to 7 teaspoons of oils a day. You probably already get this much from the food you eat.  Use [employer] to Help Build Your Meals  The [employer] can help you plan and track your meals and activity. You can look up individual foods to see or compare their nutritional value. You can get guidelines for what and how much you should eat. You can compare your food choices. And you can assess personal physical activities and see ways you can improve. Go to www.[employer].gov/supertracker/.    3267-6404 [employer]. [address] [place] [address], [place] [address]. All rights reserved. This information is not intended as a substitute for professional medical care. Always follow your healthcare professional's instructions.           Patient Education   Signs of Hearing Loss  Hearing loss is a problem shared by many people. In fact, it is one of the most common health conditions, particularly as people age. Most people over age 65 have some hearing loss, and by age 80, almost everyone does. Because hearing loss  usually occurs slowly over the years, you may not realize your hearing ability has gotten worse.       Have your hearing checked  Contact your [facility] care provider if you:    Have to strain to hear normal conversation.    Have to watch other peoples faces very carefully to follow what theyre saying.    Need to ask people to repeat what theyve said.    Often misunderstand what people are saying.    Turn the volume of the television or radio up so high that others complain.    Feel that people are mumbling when theyre talking to you.    Find that the effort to hear leaves you feeling tired and irritated.    Notice, when using the phone, that you hear better with 1 ear than the other.    5517-7046 [employer]. [address] [place] [address], [place] [address]. All rights reserved. This information is not intended as a substitute for professional medical care. Always follow your healthcare professional's instructions.         Patient Education   Urinary Incontinence (Male)    Urinary incontinence means not being able to control the release of urine from the bladder.  Causes  Common causes of urinary incontinence in men include:    Infection    Certain medicines    Aging    Poor pelvic muscle tone    Bladder spasms    Obesity    Urinary retention  Nervous system diseases, diabetes, sleep apnea, urinary tract infections, prostate surgery, and pelvic trauma can also cause incontinence. Constipation and smoking have also been identified as risk factors.  Symptoms    Urge incontinence (also called overactive bladder) is a sudden urge to urinate even though there may not be much urine in the bladder. The need to urinate often during the night is common. It is due to bladder spasms.    Stress incontinence is involuntary urine leakage that can occur with sneezing, coughing, and other actions that put stress on the bladder.  Treatment  Treatment of urinary incontinence depends on the cause. Infections of the bladder are  treated with antibiotics. Urinary retention is treated with a bladder catheter.  Home care  Follow these guidelines when caring for yourself at home:    Don't consume foods and drinks that may irritate the bladder. These include drinks containing alcohol, caffeinate, or carbonation; chocolate; and acidic fruits and juices.    Limit fluid intake to 6 to 8 cups a day.    Lose weight if you are overweight. This will reduce your symptoms.    If needed, wear absorbent pads to catch urine. Change pads frequently to maintain hygiene and prevent skin and bladder infections.    Bathe daily to maintain good hygiene.    If an antibiotic was prescribed to treat a bladder infection, be sure to take it until finished, even if you are feeling better before then. This is to make sure your infection has cleared.    If a catheter was left in place, it is important to keep bacteria from getting into the collection bag. Don't disconnect the catheter from the collection bag.    Use a leg band to secure the catheter drainage tube, so it does not pull on the catheter. Drain the collection bag when it becomes full using the drain spout at the bottom of the bag. Don't disconnect the bag from the catheter.    Don't pull on or try to remove a catheter. The catheter must be removed by a healthcare provider.  Follow-up care  Follow up with your healthcare provider, or as advised.  When to seek medical advice  Call your healthcare provider right away if any of these occur:    Fever over 100.4 F (38 C), or as directed by your healthcare provider    Bladder pain or fullness    Abdominal swelling, nausea, or vomiting    Back pain    Weakness, dizziness, or fainting    If a catheter was left in place, return if:  ? Catheter falls out  ? Catheter stops draining for 6 hours  Date Last Reviewed: 10/1/2017    0962-5566 [employer]. [address] [place] [address], [place] [address]. All rights reserved. This information is not intended as a  substitute for professional medical care. Always follow your healthcare professional's instructions.     Patient Education   Preventing Falls in the Home  As you get older, falls are more likely. Thats because your reaction time slows. Your muscles and joints may also get stiffer, making them less flexible. Illness, medications, and vision changes can also affect your balance. A fall could leave you unable to live on your own. To make your home safer, follow these tips:    Floors    Put nonskid pads under area rugs.    Remove throw rugs.    Replace worn floor coverings.    Tack carpets firmly to each step on carpeted stairs. Put nonskid strips on the edges of uncarpeted stairs.    Keep floors and stairs free of clutter and cords.    Arrange furniture so there are clear pathways.    Clean up any spills right away.    Bathrooms    Install grab bars in the tub or shower.    Apply nonskid strips or put a nonskid rubber mat in the tub or shower.    Sit on a bath chair to bathe.    Use bathmats with nonskid backing.    Lighting    Keep a flashlight in each room.    Put a nightlight along the pathway between the bedroom and the bathroom.    1190-3760 [employer]. [address] [place] [address]. All rights reserved. This information is not intended as a substitute for professional medical care. Always follow your healthcare professional's instructions.         Patient Education   Understanding Advance Care Planning  Advance care planning is the process of deciding ones own future medical care. It helps ensure that if you cant speak for yourself, your wishes can still be carried out. The plan is a series of legal documents that note a persons wishes. The documents vary by state. Advance care planning may be done when a person has a serious illness that is expected to get worse. It may be done before major surgery. And it can help you and your family be prepared in case of a major illness or injury.  Advance care planning helps with making decisions at these times.       A health care proxy is a person who acts as the voice of a patient when the patient cant speak for himself or herself. The name of this role varies by state. It may be called a Durable Medical Power of  or Durable Power of  for Healthcare. It may be called an agent, surrogate, or advocate. Or it may be called a representative or decision maker. It is an official duty that is identified by a legal document. The document also varies by state.    Why Is Advance Care Planning Important?  If a person communicates their healthcare wishes:    They will be given medical care that matches their values and goals.    Their family members will not be forced to make decisions in a crisis with no guidance.  Creating a Plan  Making an advance care plan is often done in 3 steps:    Thinking about ones wishes. To create an advance care plan, you should think about what kind of medical treatment you would want if you lose the ability to communicate. Are there any situations in which you would refuse or stop treatment? Are there therapies you would want or not want? And whom do you want to make decisions for you? There are many places to learn more about how to plan for your care. Ask your doctor or  for resources.    Picking a health care proxy. This means choosing a trusted person to speak for you only when you cant speak for yourself. When you cannot make medical decisions, your proxy makes sure the instructions in your advance care plan are followed. A proxy does not make decisions based on his or her own opinions. They must put aside those opinions and values if needed, and carry out your wishes.    Filling out the legal documents. There are several kinds of legal documents for advance care planning. Each one tells health care providers your wishes. The documents may vary by state. They must be signed and may need to be witnessed  or notarized. You can cancel or change them whenever you wish. Depending on your state, the documents may include a Healthcare Proxy form, Living Will, Durable Medical Power of , Advance Directive, or others.  The Familys Role  The best help a family can give is to support their loved ones wishes. Open and honest communication is vital. Family should express any concerns they have about the patients choices while the patient can still make decisions.    8445-9171 [employer]. [address] [place] [address]. All rights reserved. This information is not intended as a substitute for professional medical care. Always follow your healthcare professional's instructions.         Also, Scheduling Employee Scheduling [facility]North [facility] offers a free, downloadable health care directive that allows you to share your treatment choices and personal preferences if you cannot communicate your wishes. It also allows you to appoint another person (called a health care agent) to make health care decisions if you are unable to do so. You can download an advance directive by going here: [email or web address]     Patient Education   Personalized Prevention Plan  You are due for the preventive services outlined below.  Your care team is available to assist you in scheduling these services.  If you have already completed any of these items, please share that information with your care team to update in your medical record.  Health Maintenance   Topic Date Due   ? HEPATITIS C SCREENING  1945   ? ZOSTER VACCINES (1 of 2) 04/09/1997   ? MEDICARE ANNUAL WELLNESS VISIT  12/22/2021   ? FALL RISK ASSESSMENT  12/22/2021   ? LIPID  03/25/2024   ? ADVANCE CARE PLANNING  03/25/2024   ? COLORECTAL CANCER SCREENING  09/07/2028   ? TD 18+ HE  03/25/2029   ? Pneumococcal Vaccine: 65+ Years  Completed   ? INFLUENZA VACCINE RULE BASED  Completed   ? Pneumococcal Vaccine: Pediatrics (0 to 5 Years) and At-Risk  Patients (6 to 64 Years)  Aged Out   ? HEPATITIS B VACCINES  Aged Out

## 2021-06-20 ENCOUNTER — HEALTH MAINTENANCE LETTER (OUTPATIENT)
Age: 76
End: 2021-06-20

## 2021-06-20 NOTE — LETTER
Letter by Darline Ness RN at      Author: Darline Ness RN Service: -- Author Type: --    Filed:  Encounter Date: 7/12/2020 Status: (Other)       7/12/2020        Daniel Jacobs  [id number] [place] [id number]    This letter provides a written record that you were tested for COVID-19 on 7/7/20.     Your result was negative. This means that we didnt find the virus that causes COVID-19 in your sample. A test may show negative when you do actually have the virus. This can happen when the virus is in the early stages of infection, before you feel illness symptoms.    If you have symptoms   Stay home and away from others (self-isolate) until you meet ALL of the guidelines below:    Youve had no fever--and no medicine that reduces fever--for 3 full days (72 hours). And ?    Your other symptoms have gotten better. For example, your cough or breathing has improved. And?    At least 10 days have passed since your symptoms started.    During this time:    Stay home. Dont go to work, school or anywhere else.     Stay in your own room, including for meals. Use your own bathroom if you can.    Stay away from others in your home. No hugging, kissing or shaking hands. No visitors.    Clean high touch surfaces often (doorknobs, counters, handles, etc.). Use a household cleaning spray or wipes. You can find a full list on the EPA website at www.epa.gov/pesticide-registration/list-n-disinfectants-use-against-sars-cov-2.    Cover your mouth and nose with a mask, tissue or washcloth to avoid spreading germs.    Wash your hands and face often with soap and water.    Going back to work  Check with your employer for any guidelines to follow for going back to work.    Employers: This document serves as formal notice that your employee tested negative for COVID-19, as of the testing date shown above.

## 2021-06-21 NOTE — PROGRESS NOTES
Assessment/Plan:        Diagnoses and all orders for this visit:    Cough  -     ipratropium-albuterol 0.5-2.5 mg/3 mL nebulizer solution 3 mL (DUO-NEB); Take 3 mL by nebulization once.    Numbness in both hands  -     XR Cervical Spine 2 - 3 VWS; Future; Expected date: 10/23/18  -     Ambulatory referral to Vascular Center    Post-nasal drip  -     azithromycin (ZITHROMAX) 250 MG tablet; Take 500 mg (2 x 250 mg tablets) on day 1 followed by 250 mg (1 tablet) on days 2-5.  Dispense: 6 tablet; Refill: 0  He does have some sinus infection even though he does not have any pain or tenderness to the sinuses.  But he does have some postnasal drips that is dirty white colored.  I will go ahead and treat him with antibiotics at this time, he has used Zithromax in the past and tolerated that this was sent to the pharmacy.  Regarding the numbness according to the history it does sound as if it is vascular in origin though peripheral vascular exam was normal with normal pulsations.  I am going to have him do a neck x-ray to make sure there is nothing going on in the neck but I did refer him to see the vascular physicians for proper management being that he has had cardiovascular disease.  Will await the radiologist read further management plans.        Subjective:    Patient ID: Daniel Jacobs is a 73 y.o. male.    Cough   This is a new (Cough has been going on for the past 3 weeks.  Initially nonproductive and feels as if there is a tickle in his throat but recently has been becoming more productive.  Cough is a problem in the evening when he is asleep in.  He noted no external runny nose) problem. The current episode started 1 to 4 weeks ago. The problem occurs constantly (It is also relatively worse in the evenings.). The problem has been gradually worsening. Associated symptoms include coughing and fatigue. Pertinent negatives include no anorexia, chest pain, chills, congestion, fever, headaches, joint swelling, sore  throat, swollen glands or vomiting. The symptoms are aggravated by coughing. Treatments tried: He has been using some cough drops. The treatment provided mild relief.   He is also having some numbness that is noted on his hands.  Numbness is certainly worse on the left side.  It wakes him up from sleep, noted that it numbness appears worse whenever he is bending his hand and keeping it steady for a while.  Also noted that when he bends his elbow for a while he will start having the numbness.  Noted that it is involving the whole hand and fingers and it usually gets better when he straightens out his hands.  He denied having any neck pain, but he does have a history of coronary artery disease.  Wife noted that he has had a time he was diagnosed he was also having the same kind of symptoms that he is having now.    Past Medical History:   Diagnosis Date     Low back pain      Prostate cancer (H) 2007     Past Surgical History:   Procedure Laterality Date     HERNIA REPAIR       INSERT / REPLACE / REMOVE PROSTHESIS URETHRAL SPHINCTER N/A 6/23/2014    Procedure: ARTIFICIAL URINARY SPHINCTER INSERTION;  Surgeon: Torres Sanabria MD;  Location: [facility];  Service:      INSERT / REPLACE / REMOVE PROSTHESIS URETHRAL SPHINCTER N/A 2/16/2015    Procedure: REMOVAL/REPLACEMENT OF ARTIFICIAL URINARY SPHINCTER COMPONENT;  Surgeon: Torres Sanabria MD;  Location: [facility] OR;  Service:      prostatetomy       Social History     Social History     Marital status:      Spouse name: N/A     Number of children: N/A     Years of education: N/A     Social History Main Topics     Smoking status: Never Smoker     Smokeless tobacco: Never Used     Alcohol use No     Drug use: No     Sexual activity: Not Asked     Other Topics Concern     None     Social History Narrative     Family History   Problem Relation Age of Onset     Prostate cancer Brother      Allergies   Allergen Reactions     Augmentin [Amoxicillin-Pot  Clavulanate] Anaphylaxis     Codeine-Guaifenesin Anaphylaxis     Has tolerated vicodin and percocet w/o issue.     Current Outpatient Prescriptions   Medication Sig Dispense Refill     aspirin 81 MG tablet Take 81 mg by mouth daily.       atorvastatin (LIPITOR) 40 MG tablet Take 40 mg by mouth.       metoprolol tartrate (LOPRESSOR) 25 MG tablet TAKE ONE-HALF (1/2) TABLET TWICE A DAY 90 tablet 1     nitroglycerin (NITROSTAT) 0.4 MG SL tablet Place 1 tablet (0.4 mg total) under the tongue every 5 (five) minutes as needed for chest pain. 90 tablet 0     OMEGA-3/DHA/EPA/FISH OIL (FISH OIL-OMEGA-3 FATTY ACIDS) 300-1,000 mg capsule Take 2 g by mouth daily.       azithromycin (ZITHROMAX) 250 MG tablet Take 500 mg (2 x 250 mg tablets) on day 1 followed by 250 mg (1 tablet) on days 2-5. 6 tablet 0     Current Facility-Administered Medications   Medication Dose Route Frequency Provider Last Rate Last Dose     ipratropium-albuterol 0.5-2.5 mg/3 mL nebulizer solution 3 mL (DUO-NEB)  3 mL Nebulization Once [facility] Ernestine Mims MD             Review of Systems   Constitutional: Positive for fatigue. Negative for chills and fever.   HENT: Positive for postnasal drip. Negative for congestion, ear pain, rhinorrhea, sinus pain, sinus pressure and sore throat.    Respiratory: Positive for cough. Negative for chest tightness and wheezing.    Cardiovascular: Negative for chest pain and palpitations.   Gastrointestinal: Negative for anorexia and vomiting.   Musculoskeletal: Negative for joint swelling.   Neurological: Negative for dizziness, light-headedness and headaches.     Vitals:    10/23/18 1120   BP: 112/62   Pulse: 62   Resp: 16   Temp: 97.6  F (36.4  C)   TempSrc: Oral   Weight: 213 lb 1.6 oz (96.7 kg)             Objective:    Physical Exam   Constitutional: He appears well-developed and well-nourished. No distress.   HENT:   Right Ear: Tympanic membrane normal.   Left Ear: Tympanic membrane normal.   Nose: No mucosal  edema or nose lacerations. Right sinus exhibits no maxillary sinus tenderness and no frontal sinus tenderness. Left sinus exhibits no maxillary sinus tenderness and no frontal sinus tenderness.   Mouth/Throat: No oropharyngeal exudate or posterior oropharyngeal edema.   There is postnasal drip   Cardiovascular: Normal rate and regular rhythm.    Pulmonary/Chest: Effort normal. No respiratory distress. He has decreased breath sounds.   Reduced breath sounds improved with DuoNeb nebulization.   Musculoskeletal:   There is no neck tenderness, range of motion appears normal.   Lymphadenopathy:     He has no cervical adenopathy.   Neurological:   Tinel's test was negative.

## 2021-06-21 NOTE — PROGRESS NOTES
Patient presents at the request of Karen Foster for bilateral upper extremity EMG.  He has left greater than right hand numbness and tingling for approximately 1 year.  The left is worse than the right.  Predominantly digits 1-3.  He is right-handed is worse at night and with driving.  No neck pain.    EMG/NCS  results: Please see scanned full report.    Comment NCS: Abnormal study:    1.  Absent left median SNAP and transcarpal study.  2.  Prolonged left median CMAP latency and normal amplitude.  3.  Prolonged right median transcarpal latency with reduced amplitude  4.  Prolonged right median versus ulnar transcarpal latency comparison.  5.  Normal right median CMAP, bilateral ulnar studies..    Comment EMG: Normal study.  1.  Normal needle EMG bilateral upper extremities.    Interpretation: Abnormal study:    1.  Left median neuropathy at the wrist consistent with entrapment in the carpal tunnel, moderate in severity.    2. Right median neuropathy at the wrist consistent with entrapment in the carpal tunnel ,  Mild in severity     3.  There is no electrodiagnostic evidence of cervical radiculopathy, brachial plexopathy, or ulnar neuropathy in the bilateral upper extremities.    Note: Patient will trial carpal tunnel splints over-the-counter and follow-up with Karen Foster for further recommendations.    The testing was completed in its entirety by the physician.      It was our pleasure caring for your patient today, if there any questions or concerns please do not hesitate to contact us.

## 2021-06-21 NOTE — PROGRESS NOTES
Please call patient and notify him that I did review his EMG results upper extremities which shows moderate left and mild right carpal tunnel syndrome as we had suspected, no concerning cervical nerve issues noted.  If he wants we can trial hand physical therapy we had also discussed in clinic the option of wrist bracing if he wants an order.  If symptoms are not improving we could trial at any time carpal tunnel steroid injection likely starting on the left.

## 2021-06-21 NOTE — PROGRESS NOTES
ASSESSMENT: Daniel Jacobs is a 73 y.o. male presents for consultation at the request of HE PCP Christina Mims MD, with past medical history significant for prostate cancer, nephrolithiasis, low back pain, urinary incontinence, who presents today for new patient evaluation of :     -Bilateral hand numbness and tingling left greater than right worse into the middle third finger it is intermittent with driving and sleeping.  Positive Phalen test on exam indicating likely carpal tunnel syndrome however cannot completely exclude C7 radiculopathy as he does have degenerative disc changes noted on x-ray at the C6-7 level.      Patient is neurologically intact on exam. No myelopathic or red flag symptoms.      NDI Score: 0    WHO 5: 20     PHQ-2: 0      Diagnoses and all orders for this visit:    Numbness and tingling in both hands  -     EMG; Future; Expected date: 11/8/18    Degeneration of cervical intervertebral disc       PLAN:  Reviewed spine anatomy and disease process. Discussed diagnosis and treatment options with the patient today. A shared decision making model was used. The patient's values and choices were respected. The following represents what was discussed and decided upon by the provider and the patient.     -DIAGNOSTIC TESTS:  Images were personally reviewed and interpreted and explained to patient today using spine model.   --Ordered bilateral upper extremity EMG to further evaluate possible carpal tunnel syndrome.  -If EMG shows no carpal tunnel syndrome and symptoms are worsening we could consider obtaining a cervical spine MRI at that time.  --Cervical spine x-ray 10/23/2018 shows 3 mm C7-T1 spondylolisthesis and 2 mm C6-7.  Mild degenerative changes C4-5, C5-6, C6-7.    -PHYSICAL THERAPY: Did discuss trialing physical therapy pending EMG results either for hand therapy versus if EMG is normal we could consider physical therapy for cervical spine strengthening and cervical nerve glide  exercises.  Discussed the importance of core strengthening, ROM, stretching exercises with the patient and how each of these entities is important in decreasing pain.  Explained to the patient that the purpose of physical therapy is to teach the patient a home exercise program.  These exercises need to be performed every day in order to decrease pain and prevent future occurrences of pain.  Likened it to brushing one's teeth.      -Did discuss with patient if EMG is positive for carpal tunnel syndrome I would also recommend wrist bracing at nighttime and as needed during the daytime which he is open to trialing.    -MEDICATIONS: No change in medications at this time as patient has no pain and only mild discomfort with the numbness that is intermittent.  Discussed multiple medication options today with patient. Discussed risks, side effects, and proper use of medications. Patient verbalized understanding.    -INTERVENTIONS: Did discuss down the road if symptoms are not improving could consider carpal tunnel steroid injection pending EMG results.  No recommendations for spine injections at this time.  Discussed risks and benefits of injections with patient today.    -PATIENT EDUCATION: 40 minutes of total visit time was spent face to face with the patient today, greater than 50% of total time spent with patient was spent on counseling, education, and coordinating care.   -10 minutes spent outside of visit time, non-face-to-face time, reviewing chart.    -FOLLOW-UP:   Follow-up for EMG.    Advised patient to call the Spine Center if symptoms worsen or you have problems controlling bladder and bowel function.   ______________________________________________________________________    SUBJECTIVE:   Daniel Jacobs  is a 73 y.o. male who presents today for new patient evaluation of into the bilateral hand numbness and tingling left greater than right that is worse into the third middle finger that is intermittent  typically most bothersome at nighttime with sleeping which she is a side sleeper as well as with driving or having his arms elevated.      He denies any pain at all, denies neck pain or arm pain, denies difficulties with fine motor skills such as buttoning buttons or turning keys, denies upper or lower cavity weakness, denies balance changes, denies recent trips or falls, denies bowel or bladder dysfunction.  Patient denies any color changes or swelling to upper extremities.      -Treatment to Date: No prior Cervical spine surgery.  History of lumbar spine surgery many years ago.  No prior physical therapy.    -Medications:  Patient not currently taking medications as he has minimal pain.    Current Outpatient Prescriptions on File Prior to Encounter   Medication Sig Dispense Refill     albuterol (PROAIR HFA;PROVENTIL HFA;VENTOLIN HFA) 90 mcg/actuation inhaler Inhale 2 puffs every 4 (four) hours as needed (cough). 1 each 0     aspirin 81 MG tablet Take 81 mg by mouth daily.       atorvastatin (LIPITOR) 40 MG tablet Take 40 mg by mouth.       metoprolol tartrate (LOPRESSOR) 25 MG tablet TAKE ONE-HALF (1/2) TABLET TWICE A DAY 90 tablet 1     nitroglycerin (NITROSTAT) 0.4 MG SL tablet Place 1 tablet (0.4 mg total) under the tongue every 5 (five) minutes as needed for chest pain. 90 tablet 0     OMEGA-3/DHA/EPA/FISH OIL (FISH OIL-OMEGA-3 FATTY ACIDS) 300-1,000 mg capsule Take 2 g by mouth daily.       [DISCONTINUED] azithromycin (ZITHROMAX) 250 MG tablet Take 500 mg (2 x 250 mg tablets) on day 1 followed by 250 mg (1 tablet) on days 2-5. 6 tablet 0     Current Facility-Administered Medications on File Prior to Encounter   Medication Dose Route Frequency Provider Last Rate Last Dose     ipratropium-albuterol 0.5-2.5 mg/3 mL nebulizer solution 3 mL (DUO-NEB)  3 mL Nebulization Once Christina Mims MD           Allergies   Allergen Reactions     Augmentin [Amoxicillin-Pot Clavulanate] Anaphylaxis      Codeine-Guaifenesin Anaphylaxis     Has tolerated vicodin and percocet w/o issue.       Past Medical History:   Diagnosis Date     Low back pain      Prostate cancer (H) 2007        Patient Active Problem List   Diagnosis     Prostate Cancer     Prostate Cancer     Nephrolithiasis     Lower Back Pain     Urinary Incontinence       Past Surgical History:   Procedure Laterality Date     HERNIA REPAIR       INSERT / REPLACE / REMOVE PROSTHESIS URETHRAL SPHINCTER N/A 6/23/2014    Procedure: ARTIFICIAL URINARY SPHINCTER INSERTION;  Surgeon: Torres Sanabria MD;  Location: [facility];  Service:      INSERT / REPLACE / REMOVE PROSTHESIS URETHRAL SPHINCTER N/A 2/16/2015    Procedure: REMOVAL/REPLACEMENT OF ARTIFICIAL URINARY SPHINCTER COMPONENT;  Surgeon: Torres Sanabria MD;  Location: [facility];  Service:      LUMBAR SPINE SURGERY  1981     prostatetomy         Family History   Problem Relation Age of Onset     Prostate cancer Brother        Reviewed past medical, surgical, and family history with patient found on new patient intake packet located in EMR Media tab.     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  Patient denies smoking/tobacco use, denies alcohol use, denies history of being a heavy drinker, denies occasional drug use.    ROS: Positive for arm numbness and tingling, cough, changes in vision.  Specifically negative for bowel/bladder dysfunction, balance changes, headache, dizziness, foot drop, fevers, chills, appetite changes, nausea/vomiting, unexplained weight loss. Otherwise 13 systems reviewed are negative. Please see the patient's intake questionnaire from today for details.    OBJECTIVE:  /59 (Patient Site: Right Arm, Patient Position: Sitting)  Pulse (!) 58  Temp 97.5  F (36.4  C) (Oral)   Wt 219 lb (99.3 kg)  SpO2 97%  BMI 29.29 kg/m2    PHYSICAL EXAMINATION:  --CONSTITUTIONAL: Vital signs as above. No acute distress. The patient is well  nourished and well groomed.  --PSYCHIATRIC: The patient is awake, alert, oriented to person, place, time and answering questions appropriately with clear speech. Appropriate mood and affect   --HEENT: Sclera are non-injected. Extraocular muscles are intact. Thyroid moves easily upon swallowing.  Moist oral mucosa.  --SKIN: Skin over the face, bilateral upper extremities, and posterior torso is clean, dry, intact without rashes.  --RESPIRATORY: Normal rhythm and effort. No abnormal accessory muscle breathing patterns noted.   --GROSS MOTOR: Easily arises from a seated position. Toe walking and heel walking are normal.    --CERVICAL SPINE: Inspection reveals no evidence of deformity. Range of motion is not limited in cervical flexion, extension, lateral rotation. No tenderness to palpation cervical spine.  Spurling maneuver negative bilaterally.  --SHOULDERS: Full range of motion bilaterally. Negative empty can.  --UPPER EXTREMITY MOTOR TESTING:  Wrist flexion left 5/5, right 5/5  Wrist extension left 5/5, right 5/5  Pronators left 5/5, right 5/5  Biceps left 5/5, right 5/5   Triceps left 5/5, right 5/5   Shoulder abduction left 5/5, right 5/5   left 5/5, right 5/5  --NEUROLOGIC: CN III-XII are grossly intact. 2/4 symmetric biceps, brachioradialis, triceps reflexes bilaterally. Sensation to upper extremities is intact.  Negative Valladares's bilaterally.    --VASCULAR: 2/4 radial pulses bilaterally. Warm upper limbs bilaterally. Capillary refill in the upper extremities is less than 1 second.  -Wrist: Negative Tinel, positive Phalen bilaterally with reproduction of typical bilateral hand numbness and tingling    RESULTS: Prior medical records from [facility] 11/17/2017 to current and care everywhere were reviewed today.     Imaging:  Cervical spine Imaging was personally reviewed and interpreted today. The images were shown to the patient and the findings were explained using a spine model.      Xr Cervical Spine 2 -  3 Vws  Result Date: 10/24/2018  INDICATION: Anesthesia of skin COMPARISON: None. FINDINGS: Anterolisthesis of C7 on T1 measuring 3 mm. Retrolisthesis of C6 on C7 measuring 2 mm. The vertebral bodies of the cervical spine otherwise have normal stature and alignment. Mild degenerative endplate changes at C4/C5, C5/C6 and C6/C7. No prevertebral soft tissue swelling. Soft tissues unremarkable.

## 2021-06-21 NOTE — PROGRESS NOTES
"Assessment/Plan:        Diagnoses and all orders for this visit:    Cough  Lung sounds clear.  O2 sats 97%.   Recently treated for Sinusitis, completed course of zithromax. Suspect post viral cough vs ?   Inconsistent, intermittent cough.   R/o pneumonia with chest X-ray.  X-Ray results negative for pneumonia and reviewed with Daniel today in office visit.  Albuterol inhaler PRN for coughing episodes. I do not suspect bacterial infection based on vitals, xray and how he was feeling.   Discussed home supportive care and recommended rest, fluids/ warm fluids, humidification, saline nasal spray, throat lozenges, gargle with warm salt water.    Discussed red flags that would warrant urgent evaluation. Patient verbalized understanding.  Follow up if worsening symptoms, questions or concerns  Patient agreed and appeared pleased with plan      -     XR Chest 2 Views  -     albuterol (PROAIR HFA;PROVENTIL HFA;VENTOLIN HFA) 90 mcg/actuation inhaler; Inhale 2 puffs every 4 (four) hours as needed (cough).  Dispense: 1 each; Refill: 0        Date: 11/1/2018 Department: [facility]/OB Ordering/Authorizing: Thuy Lara NP   Study Result   XR CHEST 2 VIEWS  11/1/2018 2:27 PM     INDICATION: Chronic cough 5 weeks \"crackling\"  COMPARISON: None.     FINDINGS: Negative chest.     Office visit completed with duke Hughes      Subjective:    Patient ID: Daniel Jacobs is a 73 y.o. male.    HPI   Patient presents with cough lasting x 5 weeks, he has seen for once in the past, last visit with PCP for concern 10//2018. He present today for one episode of symptoms - cough woke patient up last night, at that time he felt a \"crackling\" in his chest.  Patient recently has sinus infection treated with Zithromax, last dose 10/27/18.  Patient reports cough was productive until 10/28/18 while on Zithromax, then dry cough returned.  Patient denies nasal congestion, sinus pressure/pain, headaches, sore " throat, ear pain, fever, chills, muscle aches or fatigue. Denies post nasal drip, denies heartburn or hx of GERD. Patient denies chest pain and shortness of breath.    Patient has only used OTC cough drops for symptoms.    Denies history of asthma, underlying lung condition  Nonsmoker. Quit smoking.   He is otherwise feeling well, increased cough last night.     The following portions of the patient's history were reviewed and updated as appropriate: allergies, current medications, past family history, past medical history, past social history and problem list.    Review of Systems   Constitutional: Negative.    HENT: Negative.    Eyes: Negative.    Respiratory: Positive for cough.    Cardiovascular: Negative.    Gastrointestinal: Negative.    Endocrine: Negative.    Genitourinary: Negative.    Musculoskeletal: Negative.    Skin: Negative.    Allergic/Immunologic: Negative.    Neurological: Negative.    Hematological: Negative.    Psychiatric/Behavioral: Negative.              Objective:    Physical Exam   Constitutional: He is oriented to person, place, and time. He appears well-developed and well-nourished.   HENT:   Head: Normocephalic and atraumatic.   Eyes: Conjunctivae and EOM are normal. Pupils are equal, round, and reactive to light.   Neck: Normal range of motion.   Cardiovascular: Normal rate and regular rhythm.    Pulmonary/Chest: Effort normal and breath sounds normal.   Neurological: He is alert and oriented to person, place, and time.   Psychiatric: He has a normal mood and affect. Judgment normal.           Vitals:    11/01/18 1348   BP: 118/64   Resp: 14   Temp: 97.8  F (36.6  C)   SpO2: 97%     Current Outpatient Prescriptions   Medication Sig Dispense Refill     aspirin 81 MG tablet Take 81 mg by mouth daily.       atorvastatin (LIPITOR) 40 MG tablet Take 40 mg by mouth.       metoprolol tartrate (LOPRESSOR) 25 MG tablet TAKE ONE-HALF (1/2) TABLET TWICE A DAY 90 tablet 1     OMEGA-3/DHA/EPA/FISH  OIL (FISH OIL-OMEGA-3 FATTY ACIDS) 300-1,000 mg capsule Take 2 g by mouth daily.       albuterol (PROAIR HFA;PROVENTIL HFA;VENTOLIN HFA) 90 mcg/actuation inhaler Inhale 2 puffs every 4 (four) hours as needed (cough). 1 each 0     azithromycin (ZITHROMAX) 250 MG tablet Take 500 mg (2 x 250 mg tablets) on day 1 followed by 250 mg (1 tablet) on days 2-5. 6 tablet 0     nitroglycerin (NITROSTAT) 0.4 MG SL tablet Place 1 tablet (0.4 mg total) under the tongue every 5 (five) minutes as needed for chest pain. 90 tablet 0     Current Facility-Administered Medications   Medication Dose Route Frequency Provider Last Rate Last Dose     ipratropium-albuterol 0.5-2.5 mg/3 mL nebulizer solution 3 mL (DUO-NEB)  3 mL Nebulization Once Christina Mims MD

## 2021-06-22 NOTE — PROGRESS NOTES
VASCULAR SURGERY CLINIC CONSULTATION    VASCULAR SURGEON: Sophie Smith MD    LOCATION:  [facility]    Daniel Jacobs   Medical Record #:  [id number]  YOB: 1945  Age:  73 y.o.     Date of Service: 11/28/2018    PRIMARY CARE PROVIDER: Christina Mims MD      Reason for visit: Evaluation for bilateral hand tingling    IMPRESSION: Patient who has already had EMG and evaluation suggesting mild carpal tunnel syndrome bilaterally who is wanting to make sure there is not a vascular component given that he has recently had coronary stents.  No clear arterial vascular disease in his upper extremities.  Cannot clearly rule out neurogenic thoracic outlet syndrome or vascular disease with the available studies.  Likelihood however is extremely low.    RECOMMENDATION/RISKS: A very unlikely to have upper extreme vascular disease explaining his symptoms.  Most likely consistent with carpal tunnel is pointed out by his other specialist assessment.  Will get bilateral upper extremity arterial duplex and segmental pressures to definitively rule out arterial vascular disease.  Will hold off on aggressive workup for thoracic outlet syndrome for now.  Will call him with results of the arterial studies.    HPI:  Daniel Jacobs is a 73 y.o. male who was seen today in consultation for bilateral hand tingling.  This is been going on for at least 3 years.  This occurs especially when he is driving or holding something like a magazine in front of him.  Symptoms can also occur when he is lying down in bed.  Tingling is in all fingers except the fifth digit of each hand and is usually somewhat worse on the left side.  Prior to this he had had no symptoms and no history of trauma.  He had a workup and found no cervical disease and an EMG suggested possible bilateral carpal tunnel disease.  He feels that this is somewhat unlikely given that he can make the symptoms go away by moving his shoulder not his  wrists.  He does not have classic arm claudication: Activity does not bring on the tingling numbness it is more holding position.  After the diagnosis of carpal tunnel he was given a brace but he feels like he only tried it for a day or 2 because it was not particularly inclined to believe the diagnosis.  A urine half ago he required coronary stents for acute coronary syndrome.  He also has a family history of cardiac disease in his father who  when he was very young from an MI.  Patient does not smoke.  He has not done any heavy weight lifting or change in activity.    REVIEW OF SYSTEMS:    A 12 point ROS was reviewed and except for what is listed in the HPI above, all others are negative    PHH:    Past Medical History:   Diagnosis Date     Low back pain      Prostate cancer (H)         Past Surgical History:   Procedure Laterality Date     HERNIA REPAIR       INSERT / REPLACE / REMOVE PROSTHESIS URETHRAL SPHINCTER N/A 2014    Procedure: ARTIFICIAL URINARY SPHINCTER INSERTION;  Surgeon: Torres Sanabria MD;  Location: [facility];  Service:      INSERT / REPLACE / REMOVE PROSTHESIS URETHRAL SPHINCTER N/A 2015    Procedure: REMOVAL/REPLACEMENT OF ARTIFICIAL URINARY SPHINCTER COMPONENT;  Surgeon: Torres Sanabria MD;  Location: [facility];  Service:      LUMBAR SPINE SURGERY  1981     prostatetomy         ALLERGIES:  Augmentin [amoxicillin-pot clavulanate] and Codeine-guaifenesin    MEDS:    Current Outpatient Medications:      aspirin 81 MG tablet, Take 81 mg by mouth daily., Disp: , Rfl:      atorvastatin (LIPITOR) 40 MG tablet, Take 40 mg by mouth., Disp: , Rfl:      metoprolol tartrate (LOPRESSOR) 25 MG tablet, TAKE ONE-HALF (1/2) TABLET TWICE A DAY, Disp: 90 tablet, Rfl: 3     nitroglycerin (NITROSTAT) 0.4 MG SL tablet, Place 1 tablet (0.4 mg total) under the tongue every 5 (five) minutes as needed for chest pain., Disp: 90 tablet, Rfl: 0     OMEGA-3/DHA/EPA/FISH OIL (FISH  "OIL-OMEGA-3 FATTY ACIDS) 300-1,000 mg capsule, Take 2 g by mouth daily., Disp: , Rfl:     Current Facility-Administered Medications:      ipratropium-albuterol 0.5-2.5 mg/3 mL nebulizer solution 3 mL (DUO-NEB), 3 mL, Nebulization, Once, Christina Mims MD    SOCIAL HABITS:    Social History     Tobacco Use   Smoking Status Never Smoker   Smokeless Tobacco Never Used       Social History     Substance and Sexual Activity   Alcohol Use No       Social History     Substance and Sexual Activity   Drug Use No       FAMILY HISTORY:    Family History   Problem Relation Age of Onset     Prostate cancer Brother            PE:  /68   Pulse 60   Temp 98.1  F (36.7  C)   Resp 16   Wt Readings from Last 1 Encounters:   11/08/18 219 lb (99.3 kg)     There is no height or weight on file to calculate BMI.    EXAM:  GENERAL: This is a well-developed 73 y.o. male who appears his stated age  EYES: Grossly normal.  MOUTH: Buccal mucosa normal   MUSCULOSKELETAL: Grossly normal and both lower extremities are intact.  HEME/LYMPH: No lymphedema  NEUROLOGIC: Focally intact, Alert and oriented x 3.   PSYCH: appropriate affect  INTEGUMENT: No open lesions or ulcers  Pulse Exam:   Easily palpable radial arteries bilaterally.  No change in pulsatility with exaggerated  position.          DIAGNOSTIC STUDIES:     Images:  Xr Chest 2 Views    Result Date: 11/1/2018  XR CHEST 2 VIEWS 11/1/2018 2:27 PM INDICATION: Chronic cough 5 weeks \"crackling\" COMPARISON: None. FINDINGS: Negative chest.    Us Carotid Bilateral    Result Date: 11/28/2018      BILATERAL CAROTID ULTRASOUND Indication: Bilateral hand numbness Endarterectomy: NA Previous: None Symptoms: none TECHNIQUE: Duplex exam performed utilizing 2D gray-scale imaging, Doppler interrogation with color-flow and spectral waveform analysis. Right Velocity  Chart (cm/sec) Location Right CCA- CCA- ED 21 ICA- ICA-ED 34 ECA- ECA-ED 13 Vetebral-Ante 48 " Subclavian A-Tri/Bi 139 Ratio ICA/CCA-PS 1.12 Ratio ICA/CCA-ED 1.61 Left Velocity  Chart (cm/sec) Location Left CCA-PS 92 CCA- ED 24 ICA-PSA 99 ICA-ED 28 ECA-PS 89 ECA-ED 13 Vetebral-Ante 61 Subclavian A-Tri/Bi 175 Ratio ICA/CCA-PS 1.08 Ratio ICA/CCA-ED 1.17 Comment:  FINDINGS: RIGHT: There isminimal  atheromatous plaque.   LEFT: There is minimal atheromatous plaque.   Both vertebral arteries and subclavian artery waveforms are antegrade and normal. Impression:  1. Normal  diameter stenosis of the right ICA relative to the distal ICA diameter.    2. Normal diameter stenosis of the left ICA relative to the distal ICA diameter. Evaluation based on velocities and NASCET criteria    Category PSV (cm/s)  Plaque Imaging EDV (cm/s)  ICA/CCA Ratio Normal,  <125  None <40 <2 Mild <50% <125 < than 50% DR stenosis <40 <2 Moderate Disease 50-69% 125-230 > than 50% DR stenosis  2.0-4.0 Severe->70% but less than near occlusion >230 > than 50% DR stenosis >100 >4.0 Near Occlusion May be low or undetectable Visible, extensive Variable Variable Occlusion No Flow Visible with no detectable lumen N/A N/A Plaquetype Description Type 1 Uniformly echolucent Type 2 Predominantly echolucent >50% Type 3 Predominantly echogenic >50% Type 4 Uniformly echogenic Type 5 Unclassified due to poor visualization Ulcer Visible Ulcerative Plaque       I personally reviewed the images and my interpretation is that he had a carotid duplex which is normal.  Cleaving arteries appear normal on the study..    LABS:      Sodium   Date Value Ref Range Status   01/29/2015 144 136 - 145 mmol/L Final   06/05/2014 143 136 - 145 mmol/L Final   09/10/2013 139 136 - 145 mmol/L Final     Potassium   Date Value Ref Range Status   01/29/2015 4.7 3.5 - 5.0 mmol/L Final   06/05/2014 4.4 3.5 - 5.0 mmol/L Final   09/10/2013 4.5 3.5 - 5.0 mmol/L Final     Chloride   Date Value Ref Range Status   01/29/2015 108 (H) 98 - 107 mmol/L Final   06/05/2014 246 (H) 37 - 382  mmol/L Final   09/10/2013 106 98 - 107 mmol/L Final     BUN   Date Value Ref Range Status   01/29/2015 17 8 - 22 mg/dL Final   06/05/2014 18 8 - 22 mg/dL Final   09/10/2013 18 8 - 22 mg/dL Final     Creatinine   Date Value Ref Range Status   01/29/2015 0.90 0.70 - 1.30 mg/dL Final   06/05/2014 0.90 0.70 - 1.30 mg/dL Final   09/10/2013 0.89 0.70 - 1.30 mg/dL Final     Hemoglobin   Date Value Ref Range Status   01/29/2015 14.1 14.0 - 18.0 g/dL Final   06/05/2014 14.1 14.0 - 18.0 g/dL Final   09/10/2013 14.6 14.0 - 18.0 g/dL Final     Platelets   Date Value Ref Range Status   01/29/2015 246 140 - 440 thou/uL Final   06/05/2014 274 140 - 440 thou/uL Final   09/10/2013 263 140 - 440 thou/uL Final       Sophie Smith MD  VASCULAR SURGERY

## 2021-06-22 NOTE — PROGRESS NOTES
US BEFORE APPT- Consult; Bilateral hand numbness and tingling; Dr. Mims referring. Numbness is certainly worse on the left side.  It wakes him up from sleep, noted that it numbness appears worse whenever he is bending his hand and holding a certain position for a while. Patient has done spine consult and have suggested possible carpal tunnel. He has gotten wrist support but it has not helped. Patient wants to rule out vascular. ASA, statin.

## 2021-06-26 NOTE — PROGRESS NOTES
Assessment & Plan     Edema leg    S/P coronary artery stent placement  - nitroglycerin (NITROSTAT) 0.4 MG SL tablet  Dispense: 20 tablet; Refill: 3    Asymptomatic varicose veins of both lower extremities  The edema is unilateral on the right lower extremity.  I think that it is mostly due to dependent edema as well as the fact of having varicose vein on that side more than the left side.  Discussed ways that he can manage it at home.  He would lift up his legs when sitting down.  Encouraged to be physically active as he can.  He can wear the pressure stockings intermittently if he has more swelling.  BMI:   Estimated body mass index is 31.74 kg/m  as calculated from the following:    Height as of 12/22/20: 6' (1.829 m).    Weight as of this encounter: 234 lb (106.1 kg).       No follow-ups on file.    Christina Mims MD  [employer]   Daniel Jacobs is 76 y.o. and presents today for the following health issues   HPI   He comes in today with concerns of having noticed some swelling to the lower extremity mostly on the right side.  This was noted about some days now.  He has been out in the covering with his siblings and have not done a lot of walking, swelling is much less in the morning on waking up, but starts to increase as the day goes on.  He has a past history of cardiovascular disease.  But has had no chest pains and has had no shortness of breath.  He has not had to use his nitroglycerin.  And he noted that he needed to have a refill of that at this time.    Past Medical History:   Diagnosis Date     Low back pain      Prostate cancer (H) 2007     Past Surgical History:   Procedure Laterality Date     HERNIA REPAIR       INSERT / REPLACE / REMOVE PROSTHESIS URETHRAL SPHINCTER N/A 6/23/2014    Procedure: ARTIFICIAL URINARY SPHINCTER INSERTION;  Surgeon: Torres Sanabria MD;  Location: [facility];  Service:      INSERT / REPLACE / REMOVE  PROSTHESIS URETHRAL SPHINCTER N/A 2/16/2015    Procedure: REMOVAL/REPLACEMENT OF ARTIFICIAL URINARY SPHINCTER COMPONENT;  Surgeon: Torres Sanabria MD;  Location: [facility];  Service:      LUMBAR SPINE SURGERY  1981     prostatetomy       Social History     Socioeconomic History     Marital status:      Spouse name: None     Number of children: None     Years of education: None     Highest education level: None   Occupational History     None   Social Needs     Financial resource strain: None     Food insecurity     Worry: None     Inability: None     Transportation needs     Medical: None     Non-medical: None   Tobacco Use     Smoking status: Never Smoker     Smokeless tobacco: Never Used   Substance and Sexual Activity     Alcohol use: No     Drug use: No     Sexual activity: None   Lifestyle     Physical activity     Days per week: None     Minutes per session: None     Stress: None   Relationships     Social connections     Talks on phone: None     Gets together: None     Attends [employer] service: None     Active member of club or organization: None     Attends meetings of clubs or organizations: None     Relationship status: None     Intimate partner violence     Fear of current or ex partner: None     Emotionally abused: None     Physically abused: None     Forced sexual activity: None   Other Topics Concern     None   Social History Narrative     None     Family History   Problem Relation Age of Onset     Prostate cancer Brother      Allergies   Allergen Reactions     Augmentin [Amoxicillin-Pot Clavulanate] Anaphylaxis     Benzocaine      Other reaction(s): Throat Swelling/Closing  Throat swelling     Codeine-Guaifenesin Anaphylaxis     Has tolerated vicodin and percocet w/o issue.     Current Outpatient Medications   Medication Sig Dispense Refill     aspirin 81 MG tablet Take 81 mg by mouth daily.       atorvastatin (LIPITOR) 40 MG tablet TAKE 1 TABLET DAILY 90 tablet 3     LUTEIN  EXTRACT-ZEAXANTHIN EXT ORAL Take 1 tablet by mouth daily.       metoprolol tartrate (LOPRESSOR) 25 MG tablet TAKE ONE-HALF (1/2) TABLET TWICE A DAY 90 tablet 3     OMEGA-3/DHA/EPA/FISH OIL (FISH OIL-OMEGA-3 FATTY ACIDS) 300-1,000 mg capsule Take 2 g by mouth daily.       nitroglycerin (NITROSTAT) 0.4 MG SL tablet Place 1 tablet (0.4 mg total) under the tongue every 5 (five) minutes as needed for chest pain. 20 tablet 3     omeprazole (PRILOSEC) 20 MG capsule Take 1 capsule (20 mg total) by mouth daily before breakfast. 30 capsule 0     No current facility-administered medications for this visit.          Review of Systems   Constitutional: Negative.    Respiratory: Negative for cough, shortness of breath and wheezing.    Cardiovascular: Positive for leg swelling. Negative for chest pain.   Gastrointestinal: Negative.    Musculoskeletal: Negative for back pain.   Neurological: Negative for light-headedness and headaches.           Objective    /58 (Patient Site: Left Arm, Patient Position: Sitting, Cuff Size: Adult Large)   Pulse 82   Temp 97.9  F (36.6  C) (Axillary)   Wt (!) 234 lb (106.1 kg)   SpO2 97%   BMI 31.74 kg/m    Body mass index is 31.74 kg/m .  Physical Exam   Constitutional: He appears well-developed and well-nourished. No distress.   He is afebrile to touch and not pale.   Cardiovascular: Normal rate and regular rhythm.   Pulmonary/Chest: Effort normal and breath sounds normal.   Abdominal: Soft.   Musculoskeletal: Normal range of motion.         General: Edema present.      Comments: He does have a mild pedal pitting edema noted on the right lower extremity mostly around the ankle joint and about the distal one third of the leg.  He does have moderate to severe varicose veins noted on the inner aspect of the knee as well as the popliteal region on the right side as well.  There is evidence of venous insufficiency with discoloration noted on the feet on the left.

## 2021-07-06 VITALS
BODY MASS INDEX: 31.74 KG/M2 | DIASTOLIC BLOOD PRESSURE: 58 MMHG | HEART RATE: 82 BPM | WEIGHT: 234 LBS | TEMPERATURE: 97.9 F | SYSTOLIC BLOOD PRESSURE: 100 MMHG | OXYGEN SATURATION: 97 %

## 2021-08-03 ENCOUNTER — TELEPHONE (OUTPATIENT)
Dept: FAMILY MEDICINE | Facility: CLINIC | Age: 76
End: 2021-08-03

## 2021-08-03 DIAGNOSIS — Z95.5 S/P CORONARY ARTERY STENT PLACEMENT: ICD-10-CM

## 2021-08-03 NOTE — TELEPHONE ENCOUNTER
8-3-21  Reason for Call:   medication refill:    Do you use a [facility]?  [facility]     Name of the medication requested: metoprolol tartrate (LOPRESSOR) 25 MG tablet    Other request: none    Can we leave a detailed message on this number? YES    Phone number patient can be reached at: Cell number on file:    Telephone Information:   Mobile [phone]       Best Time: anytime    Call taken on 8/3/2021 at 2:48 PM by Denisse Canales

## 2021-08-04 RX ORDER — METOPROLOL TARTRATE 25 MG/1
25 TABLET, FILM COATED ORAL 2 TIMES DAILY
Qty: 90 TABLET | Refills: 2 | Status: SHIPPED | OUTPATIENT
Start: 2021-08-04 | End: 2022-06-20

## 2021-08-04 NOTE — TELEPHONE ENCOUNTER
Last seen on 6/18/21 by Felicita.  Looks like saw Cardiology and they filled Metoprolol on 5/23/21 #90.  Please advise on filling medication . NICKIE BECKER on 8/4/2021 at 3:27 PM

## 2021-10-11 ENCOUNTER — HEALTH MAINTENANCE LETTER (OUTPATIENT)
Age: 76
End: 2021-10-11

## 2021-12-14 ENCOUNTER — OFFICE VISIT (OUTPATIENT)
Dept: FAMILY MEDICINE | Facility: CLINIC | Age: 76
End: 2021-12-14
Payer: COMMERCIAL

## 2021-12-14 ENCOUNTER — ANCILLARY PROCEDURE (OUTPATIENT)
Dept: GENERAL RADIOLOGY | Facility: CLINIC | Age: 76
End: 2021-12-14
Attending: FAMILY MEDICINE
Payer: COMMERCIAL

## 2021-12-14 VITALS
WEIGHT: 230.38 LBS | SYSTOLIC BLOOD PRESSURE: 100 MMHG | OXYGEN SATURATION: 97 % | HEART RATE: 66 BPM | DIASTOLIC BLOOD PRESSURE: 64 MMHG | BODY MASS INDEX: 31.24 KG/M2

## 2021-12-14 DIAGNOSIS — M79.672 PAIN OF LEFT HEEL: ICD-10-CM

## 2021-12-14 DIAGNOSIS — M79.672 PAIN OF LEFT HEEL: Primary | ICD-10-CM

## 2021-12-14 PROCEDURE — 73650 X-RAY EXAM OF HEEL: CPT | Mod: TC | Performed by: RADIOLOGY

## 2021-12-14 PROCEDURE — 99214 OFFICE O/P EST MOD 30 MIN: CPT | Performed by: FAMILY MEDICINE

## 2021-12-14 NOTE — PROGRESS NOTES
Assessment & Plan     Pain of left heel  - XR Calcaneus Left G/E 2 Views  - Orthopedic  Referral  - Ankle/Foot Bracing Supplies Order for DME - ONLY FOR DME  Calcaneal pain not really on the medial but towards the lateral aspect of the heel.  I will get an x-ray as well as have him on heel cup to help him with.  I did also order for referral to be seen by the podiatrist for further evaluation and management.       BMI:   Estimated body mass index is 31.24 kg/m  as calculated from the following:    Height as of 12/22/20: 1.829 m (6').    Weight as of this encounter: 104.5 kg (230 lb 6 oz).         No follow-ups on file.    Christina Mims MD  [facility]    Aroldo Sanchez is a 76 year old who presents for the following health issues     HPI   He comes in with concerns of a right-sided heel pain noted to be growing.  He noted no injuries.  Does not have any observable lesions.  Pain is worse when he is walking around on it.  No swelling cyst noted.  He does have an area in the heel that causes more pain than the rest.  He walks about 2 days in a week for about 6 hours but less than half of the time he is walking around.  Noted no swellings.  Family History   Problem Relation Age of Onset     Prostate Cancer Brother       Social History     Socioeconomic History     Marital status:      Spouse name: Not on file     Number of children: Not on file     Years of education: Not on file     Highest education level: Not on file   Occupational History     Not on file   Tobacco Use     Smoking status: Never Smoker     Smokeless tobacco: Never Used   Substance and Sexual Activity     Alcohol use: No     Drug use: No     Sexual activity: Not on file   Other Topics Concern     Not on file   Social History Narrative     Not on file     Social Determinants of Health     Financial Resource Strain: Not on file   Food Insecurity: Not on file   Transportation Needs: Not on file    Physical Activity: Not on file   Stress: Not on file   Social Connections: Not on file   Intimate Partner Violence: Not on file   Housing Stability: Not on file      Past Surgical History:   Procedure Laterality Date     HERNIA REPAIR       INSERT / REPLACE / REMOVE PROSTHESIS URETHRAL SPHINCTER N/A 6/23/2014    Procedure: ARTIFICIAL URINARY SPHINCTER INSERTION;  Surgeon: Torres Sanabria MD;  Location: [facility];  Service:      INSERT / REPLACE / REMOVE PROSTHESIS URETHRAL SPHINCTER N/A 2/16/2015    Procedure: REMOVAL/REPLACEMENT OF ARTIFICIAL URINARY SPHINCTER COMPONENT;  Surgeon: Torres Sanabria MD;  Location: [facility];  Service:      LUMBAR SPINE SURGERY  1981     OTHER SURGICAL HISTORY      prostatetomy      Past Medical History:   Diagnosis Date     Low back pain      Prostate cancer (H) 2007          Review of Systems   Constitutional, HEENT, cardiovascular, pulmonary, gi and gu systems are negative, except as otherwise noted.      Objective    /64 (BP Location: Left arm, Patient Position: Sitting, Cuff Size: Adult Large)   Pulse 66   Wt 104.5 kg (230 lb 6 oz)   SpO2 97%   BMI 31.24 kg/m    Body mass index is 31.24 kg/m .  Physical Exam   GENERAL: healthy, alert and no distress  RESP: He has a quiet unlabored respiration  CV: peripheral pulses strong and no peripheral edema  ABDOMEN: soft, nontender  MS: Examination of the left feet did not show any swelling to the ankles or the feet.  On the heel he does have an area around the medial aspect that is sore to touch.  Does appear to be swollen slightly.  No lesions are noted.  Rest of the ankle and feet were normal.

## 2021-12-14 NOTE — PROGRESS NOTES
Answers for HPI/ROS submitted by the patient on 12/11/2021  How many servings of fruits and vegetables do you eat daily?: 2-3  On average, how many sweetened beverages do you drink each day (Examples: soda, juice, sweet tea, etc.  Do NOT count diet or artificially sweetened beverages)?: 1  How many minutes a day do you exercise enough to make your heart beat faster?: 9 or less  How many days a week do you exercise enough to make your heart beat faster?: 3 or less  How many days per week do you miss taking your medication?: 0

## 2021-12-27 ENCOUNTER — E-VISIT (OUTPATIENT)
Dept: URGENT CARE | Facility: CLINIC | Age: 76
End: 2021-12-27
Payer: COMMERCIAL

## 2021-12-27 DIAGNOSIS — Z20.822 CLOSE EXPOSURE TO 2019 NOVEL CORONAVIRUS: Primary | ICD-10-CM

## 2021-12-27 PROCEDURE — 99421 OL DIG E/M SVC 5-10 MIN: CPT | Performed by: FAMILY MEDICINE

## 2021-12-27 NOTE — PATIENT INSTRUCTIONS
"  Dear Daniel Jacobs,    Based on your exposure to COVID-19 (coronavirus), we would like to test you for this virus. I have placed an order for this test.The best time for testing is 5-7 days after the exposure.    How to schedule:  Go to your [employer] home page and scroll down to the section that says  You have an appointment that needs to be scheduled  and click the large green button that says  Schedule Now  and follow the steps to find the next available opening.     If you are unable to complete these [employer] scheduling steps, please call [phone] to schedule your testing.     Return to work/school/ guidance:   For people with high risk exposures outside the home    Please let your workplace manager and staffing office know when your quarantine ends.     We can not give you an exact date as it depends on the information below. You can calculate this on your own or work with your manager/staffing office to calculate this. (For example if you were exposed on 10/4, you would have to quarantine for 14 full days. That would be through 10/18. You could return on 10/19.)    Quarantine Guidelines:  Patients (\"contacts\") who have been in close prolonged contact of an infected person(s) (within six feet for at least 15 minutes within a 24 hour period), and remain asymptomatic should enter quarantine based on the following options:    14-day quarantine period (this remains the CDC recommendation for the greatest protection against spread of COVID-19) OR    Minimum 7-day quarantine with negative RT-PCR test collected on day 5 or later OR    10-day quarantine with no test  Quarantine Guideline exceptions are as follows:    People who have been fully vaccinated do not need to quarantine if the exposure was at least 2 weeks after the last vaccination. This includes vaccinated health care workers.    Not fully vaccinated and unvaccinated Individuals who work in health care, congregate care, or congregate living " should be off work for 14 days from their last date of exposure. Community activities for this group can be resumed based on options above. Fully vaccinated individuals in this group do not need to quarantine from work after exposure.    Not fully vaccinated and unvaccinated people whose high-risk exposure was a household member should always quarantine for 14 days from their last date of exposure. Fully vaccinated people in this category do not need to quarantine.    Not fully vaccinated or unvaccinated residents of congregate care and congregate living settings should always quarantine for 14 days from their last date of exposure. Fully vaccinated residents do not need to quarantine.  Note: If you have ongoing exposure to the covid positive person, this quarantine period may be more than 14 days. (For example, if you are continued to be exposed to your child who tested positive and cannot isolate from them, then the quarantine of 7-14 days can't start until your child is no longer contagious. This is typically 10 days from onset of the child's symptoms. So the total duration may be 17-24 days in this case.)    You should continue symptom monitoring until day 14 post-exposure. If you develop signs or symptoms of COVID-19, isolate and get tested (even if you have been tested already).    How to quarantine:   Stay home and away from others. Don't go to school or anywhere else. Generally quarantine means staying home from work but there are some exceptions to this. Please contact your workplace.  No hugging, kissing or shaking hands.  Don't let anyone visit.  Cover your mouth and nose with a mask, tissue or washcloth to avoid spreading germs.  Wash your hands and face often. Use soap and water.    What are the symptoms of COVID-19?  The most common symptoms are cough, fever and trouble breathing. Less common symptoms include headache, body aches, fatigue (feeling very tired), chills, sore throat, stuffy or runny nose,  diarrhea (loose poop), loss of taste or smell, belly pain, and nausea or vomiting (feeling sick to your stomach or throwing up).  After 14 days, if you have still don't have symptoms, you likely don't have this virus.  If you develop symptoms, follow these guidelines.  If you're normally healthy: Please start another eVisit.  If you have a serious health problem (like cancer, heart failure, an organ transplant or kidney disease): Call your specialty clinic. Let them know that you might have COVID-19.    Where can I get more information?  [facility] - About COVID-19: [email or web address]  CDC - What to Do If You're Sick: www.cdc.gov/coronavirus/2019-ncov/about/steps-when-sick.html  CDC - Ending Home Isolation: www.cdc.gov/coronavirus/2019-ncov/hcp/disposition-in-home-patients.html  CDC - Caring for Someone: www.cdc.gov/coronavirus/2019-ncov/if-you-are-sick/care-for-someone.html  [facility] clinical trials (COVID-19 research studies): clinicalaffairs.[facility]-clinical-trials  Below are the COVID-19 hotlines at the [place] [facility] ([facility]). Interpreters are available.  For health questions: Call 809-946-3651 or 1-801.430.6121 (7 a.m. to 7 p.m.)  For questions about schools and childcare: Call 055-693-2772 or 1-180.369.2636 (7 a.m. to 7 p.m.)

## 2021-12-28 ENCOUNTER — LAB (OUTPATIENT)
Dept: LAB | Facility: CLINIC | Age: 76
End: 2021-12-28
Attending: FAMILY MEDICINE
Payer: COMMERCIAL

## 2021-12-28 DIAGNOSIS — Z20.822 CLOSE EXPOSURE TO 2019 NOVEL CORONAVIRUS: ICD-10-CM

## 2021-12-28 PROCEDURE — U0003 INFECTIOUS AGENT DETECTION BY NUCLEIC ACID (DNA OR RNA); SEVERE ACUTE RESPIRATORY SYNDROME CORONAVIRUS 2 (SARS-COV-2) (CORONAVIRUS DISEASE [COVID-19]), AMPLIFIED PROBE TECHNIQUE, MAKING USE OF HIGH THROUGHPUT TECHNOLOGIES AS DESCRIBED BY CMS-2020-01-R: HCPCS

## 2021-12-28 PROCEDURE — U0005 INFEC AGEN DETEC AMPLI PROBE: HCPCS

## 2021-12-29 ENCOUNTER — OFFICE VISIT (OUTPATIENT)
Dept: PODIATRY | Facility: CLINIC | Age: 76
End: 2021-12-29
Attending: FAMILY MEDICINE
Payer: COMMERCIAL

## 2021-12-29 VITALS — BODY MASS INDEX: 30.48 KG/M2 | HEIGHT: 72 IN | OXYGEN SATURATION: 98 % | HEART RATE: 70 BPM | WEIGHT: 225 LBS

## 2021-12-29 DIAGNOSIS — M72.2 PLANTAR FASCIITIS: Primary | ICD-10-CM

## 2021-12-29 DIAGNOSIS — M79.672 PAIN OF LEFT HEEL: ICD-10-CM

## 2021-12-29 LAB — SARS-COV-2 RNA RESP QL NAA+PROBE: NEGATIVE

## 2021-12-29 PROCEDURE — 20550 NJX 1 TENDON SHEATH/LIGAMENT: CPT | Mod: LT | Performed by: PODIATRIST

## 2021-12-29 PROCEDURE — 99203 OFFICE O/P NEW LOW 30 MIN: CPT | Mod: 25 | Performed by: PODIATRIST

## 2021-12-29 RX ORDER — NITROGLYCERIN 0.4 MG/1
0.4 TABLET SUBLINGUAL EVERY 5 MIN PRN
COMMUNITY
End: 2023-05-17

## 2021-12-29 RX ORDER — LIDOCAINE HYDROCHLORIDE 20 MG/ML
1 INJECTION, SOLUTION INFILTRATION; PERINEURAL ONCE
Status: COMPLETED | OUTPATIENT
Start: 2021-12-29 | End: 2021-12-29

## 2021-12-29 RX ORDER — DEXAMETHASONE SODIUM PHOSPHATE 4 MG/ML
4 INJECTION, SOLUTION INTRA-ARTICULAR; INTRALESIONAL; INTRAMUSCULAR; INTRAVENOUS; SOFT TISSUE ONCE
Status: COMPLETED | OUTPATIENT
Start: 2021-12-29 | End: 2021-12-29

## 2021-12-29 RX ADMIN — DEXAMETHASONE SODIUM PHOSPHATE 4 MG: 4 INJECTION, SOLUTION INTRA-ARTICULAR; INTRALESIONAL; INTRAMUSCULAR; INTRAVENOUS; SOFT TISSUE at 11:06

## 2021-12-29 RX ADMIN — LIDOCAINE HYDROCHLORIDE 1 ML: 20 INJECTION, SOLUTION INFILTRATION; PERINEURAL at 11:05

## 2021-12-29 ASSESSMENT — PAIN SCALES - GENERAL: PAINLEVEL: MODERATE PAIN (5)

## 2021-12-29 ASSESSMENT — MIFFLIN-ST. JEOR: SCORE: 1798.59

## 2021-12-29 NOTE — LETTER
12/29/2021         RE: Daniel Jacobs  [address] [facility] [address]        Dear Colleague,    Thank you for referring your patient, Daniel Jacobs, to the [facility]. Please see a copy of my visit note below.    FOOT AND ANKLE SURGERY/PODIATRY CONSULT NOTE         ASSESSMENT:   Plantar fasciitis left foot      TREATMENT:  The patient was given a cortisone injection in the left heel today consisting of 1 cc of dexamethasone sodium phosphate and 1 cc of 2% lidocaine plain.  I have also recommended orthotics.        HPI: I was asked to see Daniel Jacobs today to evaluate and treat left heel pain.  The patient indicated that he has had this heel pain for several months.  He thinks he had it for at least 4 months.  The pain is located on the bottom of the left heel and is aggravated with weightbearing and ambulation.  He describes it as a throbbing pain which is partially relieved with nonweightbearing.  He was given heel cups by his primary care physician and he stated this has given him some relief.  He denies any trauma to his left foot.  He has not had any associated redness or swelling.  The patient was seen in consultation at the request of Christina Mims MD for evaluation and treatment of left heel pain.     Past Medical History:   Diagnosis Date     Cardiac abnormality      Low back pain      Prostate cancer (H) 2007       Social History     Socioeconomic History     Marital status:      Spouse name: Not on file     Number of children: Not on file     Years of education: Not on file     Highest education level: Not on file   Occupational History     Not on file   Tobacco Use     Smoking status: Never Smoker     Smokeless tobacco: Never Used   Substance and Sexual Activity     Alcohol use: No     Drug use: No     Sexual activity: Not on file   Other Topics Concern     Not on file   Social History Narrative     Not on file     Social Determinants of  Health     Financial Resource Strain: Not on file   Food Insecurity: Not on file   Transportation Needs: Not on file   Physical Activity: Not on file   Stress: Not on file   Social Connections: Not on file   Intimate Partner Violence: Not on file   Housing Stability: Not on file          Allergies   Allergen Reactions     Augmentin Anaphylaxis     Benzocaine Unknown     Other reaction(s): Throat Swelling/Closing, Throat swelling     Codeine-Guaifenesin Anaphylaxis     Has tolerated vicodin and percocet w/o issue.          Current Outpatient Medications:      aspirin 81 MG tablet, [ASPIRIN 81 MG TABLET] Take 81 mg by mouth daily., Disp: , Rfl:      atorvastatin (LIPITOR) 40 MG tablet, [ATORVASTATIN (LIPITOR) 40 MG TABLET] TAKE 1 TABLET DAILY, Disp: 90 tablet, Rfl: 3     LUTEIN EXTRACT-ZEAXANTHIN EXT ORAL, [LUTEIN EXTRACT-ZEAXANTHIN EXT ORAL] Take 1 tablet by mouth daily., Disp: , Rfl:      metoprolol tartrate (LOPRESSOR) 25 MG tablet, Take 1 tablet (25 mg) by mouth 2 times daily, Disp: 90 tablet, Rfl: 2     OMEGA-3/DHA/EPA/FISH OIL (FISH OIL-OMEGA-3 FATTY ACIDS) 300-1,000 mg capsule, [OMEGA-3/DHA/EPA/FISH OIL (FISH OIL-OMEGA-3 FATTY ACIDS) 300-1,000 MG CAPSULE] Take 2 g by mouth daily., Disp: , Rfl:      nitroglycerin (NITROSTAT) 0.4 MG SL tablet, [NITROGLYCERIN (NITROSTAT) 0.4 MG SL TABLET] Place 1 tablet (0.4 mg total) under the tongue every 5 (five) minutes as needed for chest pain., Disp: 90 tablet, Rfl: 3     Family History   Problem Relation Age of Onset     Prostate Cancer Brother         Social History     Socioeconomic History     Marital status:      Spouse name: Not on file     Number of children: Not on file     Years of education: Not on file     Highest education level: Not on file   Occupational History     Not on file   Tobacco Use     Smoking status: Never Smoker     Smokeless tobacco: Never Used   Substance and Sexual Activity     Alcohol use: No     Drug use: No     Sexual activity: Not on  file   Other Topics Concern     Not on file   Social History Narrative     Not on file     Social Determinants of Health     Financial Resource Strain: Not on file   Food Insecurity: Not on file   Transportation Needs: Not on file   Physical Activity: Not on file   Stress: Not on file   Social Connections: Not on file   Intimate Partner Violence: Not on file   Housing Stability: Not on file        Review of Systems - Patient denies fever, chills, rash, wound, stiffness, limping, numbness, weakness, heart burn, blood in stool, chest pain with activity, calf pain when walking, shortness of breath with activity, chronic cough, easy bleeding/bruising, swelling of ankles, excessive thirst, fatigue, depression, anxiety.  Patient admits to left heel pain.      OBJECTIVE:  Appearance: alert, well appearing, and in no distress.    Pulse 70   Ht 1.829 m (6')   Wt 102.1 kg (225 lb)   SpO2 98%   BMI 30.52 kg/m       Body mass index is 30.52 kg/m .     General appearance: Patient is alert and fully cooperative with history & exam.  No sign of distress is noted during the visit.  Psychiatric: Affect is pleasant & appropriate.  Patient appears motivated to improve health.  Respiratory: Breathing is regular & unlabored while sitting.  HEENT: Hearing is intact to spoken word.  Speech is clear.  No gross evidence of visual impairment that would impact ambulation.    Vascular: Dorsalis pedis and posterior tibial pulses are palpable. There is pedal hair growth bilaterally.  CFT < 3 sec from anterior tibial surface to distal digits bilaterally. There is no appreciable edema noted.  Dermatologic: Turgor and texture are within normal limits. No coloration or temperature changes. No primary or secondary lesions noted.  Neurologic: All epicritic and proprioceptive sensations are grossly intact bilaterally.  Musculoskeletal: All active and passive ankle, subtalar, midtarsal, and 1st MPJ range of motion are grossly intact without pain or  crepitus, with the exception of none. Manual muscle strength is within normal limits bilaterally. All dorsiflexors, plantarflexors, invertors, evertors are intact bilaterally. Tenderness present to the plantar medial aspect of the left heel on palpation.  No tenderness to the left heel with range of motion. Calf is soft/non-tender without warmth/induration    Imaging:       No images are attached to the encounter or orders placed in the encounter.     XR Calcaneus Left G/E 2 Views    Result Date: 12/14/2021  EXAM: XR CALCANEUS LEFT G/E 2 VIEWS LOCATION: [facility] DATE/TIME: 12/14/2021 12:15 PM INDICATION:  Pain of left heel COMPARISON: None.     IMPRESSION: Tiny plantar calcaneal spur. Calcaneus otherwise negative. No fractures.     XR Calcaneus Left G/E 2 Views    Result Date: 12/14/2021  EXAM: XR CALCANEUS LEFT G/E 2 VIEWS LOCATION: [facility] DATE/TIME: 12/14/2021 12:15 PM INDICATION:  Pain of left heel COMPARISON: None.     IMPRESSION: Tiny plantar calcaneal spur. Calcaneus otherwise negative. No fractures.         Pavel Mayer DPM  [facility] Foot & Ankle Surgery/Podiatry         Again, thank you for allowing me to participate in the care of your patient.        Sincerely,        Pavel Concepcion DPM

## 2021-12-29 NOTE — PATIENT INSTRUCTIONS
What are Prescription Custom Orthotics?  Custom orthotics are specially-made devices designed to support and comfort your feet. Prescription orthotics are crafted for you and no one else. They match the contours of your feet precisely and are designed for the way you move. Orthotics are only manufactured after a podiatrist has conducted a complete evaluation of your feet, ankles, and legs, so the orthotic can accommodate your unique foot structure and pathology.  Prescription orthotics are divided into two categories:    Functional orthotics are designed to control abnormal motion. They may be used to treat foot pain caused by abnormal motion; they can also be used to treat injuries such as shin splints or tendinitis. Functional orthotics are usually crafted of a semi-rigid material such as plastic or graphite.    Accommodative orthotics are softer and meant to provide additional cushioning and support. They can be used to treat diabetic foot ulcers, painful calluses on the bottom of the foot, and other uncomfortable conditions.  Podiatrists use orthotics to treat foot problems such as plantar fasciitis, bursitis, tendinitis, diabetic foot ulcers, and foot, ankle, and heel pain. Clinical research studies have shown that podiatrist-prescribed foot orthotics decrease foot pain and improve function.  Orthotics typically cost more than shoe inserts purchased in a retail store, but the additional cost is usually well worth it. Unlike shoe inserts, orthotics are molded to fit each individual foot, so you can be sure that your orthotics fit and do what they're supposed to do. Prescription orthotics are also made of top-notch materials and last many years when cared for properly. Insurance often helps pay for prescription orthotics.  What are Shoe Inserts?   You've seen them at the grocery store and at the mall. You've probably even seen them on TV and online. Shoe inserts are any kind of non-prescription foot support  designed to be worn inside a shoe. Pre-packaged, mass produced, arch supports are shoe inserts. So are the  custom-made  insoles and foot supports that you can order online or at retail stores. Unless the device has been prescribed by a doctor and crafted for your specific foot, it's a shoe insert, not a custom orthotic device--despite what the ads might say.  Shoe inserts can be very helpful for a variety of foot ailments, including flat arches and foot and leg pain. They can cushion your feet, provide comfort, and support your arches, but they can't correct biomechanical foot problems or cure long-standing foot issues.  The most common types of shoe inserts are:    Arch supports: Some people have high arches. Others have low arches or flat feet. Arch supports generally have a  bumped-up  appearance and are designed to support the foot's natural arch.     Insoles: Insoles slip into your shoe to provide extra cushioning and support. Insoles are often made of gel, foam, or plastic.     Heel liners: Heel liners, sometimes called heel pads or heel cups, provide extra cushioning in the heel region. They may be especially useful for patients who have foot pain caused by age-related thinning of the heels' natural fat pads.     Foot cushions: Do your shoes rub against your heel or your toes? Foot cushions come in many different shapes and sizes and can be used as a barrier between you and your shoe.  Choosing an Over-the-Counter Shoe Insert  Selecting a shoe insert from the wide variety of devices on the market can be overwhelming. Here are some podiatrist-tested tips to help you find the insert that best meets your needs:    Consider your health. Do you have diabetes? Problems with circulation? An over-the-counter insert may not be your best bet. Diabetes and poor circulation increase your risk of foot ulcers and infections, so schedule an appointment with a podiatrist. He or she can help you select a solution that won't  cause additional health problems.     Think about the purpose. Are you planning to run a marathon, or do you just need a little arch support in your work shoes? Look for a product that fits your planned level of activity.     Bring your shoes. For the insert to be effective, it has to fit into your shoes. So bring your sneakers, dress shoes, or work boots--whatever you plan to wear with your insert. Look for an insert that will fit the contours of your shoe.     Try them on. If all possible, slip the insert into your shoe and try it out. Walk around a little. How does it feel? Don't assume that feelings of pressure will go away with continued wear. (If you can't try the inserts at the store, ask about the store's return policy and hold on to your receipt.)    Please call one of the [place] locations below to schedule an appointment. If you received a prescription please bring it with you to your appointment. Some locations are limited to what they carry.    Office Locations    [facility]  [address] [place] [address]  Home Medical Equipment, [address]   Phone: [phone]   Orthotics and Prosthetics, [address]   Phone: [phone]    [facility]  Home Medical Equipment  [address] [address]   Phone: [phone]    Orthotics and Prosthetics ([facility])    [address], [address], [place] [address]  Phone: [phone]    [facility]  [address]   [place] [address]   Phone: [phone]    [facility]  [address]  [place] [address]  Phone: [phone]    [facility]   [address]  [place] [address]  Phone: [phone]    [facility]  [address]  [place] [address]  Phone: [phone]    [facility]  [address]  [place] [address]  Phone: [phone]    [address] [place] [address]   Phone: [phone]

## 2022-01-11 ASSESSMENT — ACTIVITIES OF DAILY LIVING (ADL): CURRENT_FUNCTION: NO ASSISTANCE NEEDED

## 2022-01-14 ENCOUNTER — OFFICE VISIT (OUTPATIENT)
Dept: FAMILY MEDICINE | Facility: CLINIC | Age: 77
End: 2022-01-14
Payer: COMMERCIAL

## 2022-01-14 VITALS
DIASTOLIC BLOOD PRESSURE: 62 MMHG | SYSTOLIC BLOOD PRESSURE: 100 MMHG | HEIGHT: 72 IN | HEART RATE: 69 BPM | OXYGEN SATURATION: 97 % | BODY MASS INDEX: 31.21 KG/M2 | WEIGHT: 230.4 LBS

## 2022-01-14 DIAGNOSIS — I73.9 PERIPHERAL VASCULAR DISEASE (H): ICD-10-CM

## 2022-01-14 DIAGNOSIS — C61 MALIGNANT NEOPLASM OF PROSTATE (H): ICD-10-CM

## 2022-01-14 DIAGNOSIS — Z00.00 ENCOUNTER FOR MEDICARE ANNUAL WELLNESS EXAM: Primary | ICD-10-CM

## 2022-01-14 DIAGNOSIS — Z95.5 S/P CORONARY ARTERY STENT PLACEMENT: ICD-10-CM

## 2022-01-14 PROCEDURE — 99397 PER PM REEVAL EST PAT 65+ YR: CPT | Performed by: FAMILY MEDICINE

## 2022-01-14 ASSESSMENT — MIFFLIN-ST. JEOR: SCORE: 1815.15

## 2022-01-14 ASSESSMENT — ACTIVITIES OF DAILY LIVING (ADL): CURRENT_FUNCTION: NO ASSISTANCE NEEDED

## 2022-01-14 NOTE — PATIENT INSTRUCTIONS
Patient Education   Personalized Prevention Plan  You are due for the preventive services outlined below.  Your care team is available to assist you in scheduling these services.  If you have already completed any of these items, please share that information with your care team to update in your medical record.  Health Maintenance Due   Topic Date Due     ANNUAL REVIEW OF HM ORDERS  Never done     Zoster (Shingles) Vaccine (1 of 2) Never done     AORTIC ANEURYSM SCREENING (SYSTEM ASSIGNED)  Never done     FALL RISK ASSESSMENT  12/22/2021       Exercise for a Healthier Heart  You may wonder how you can improve the health of your heart. If you re thinking about exercise, you re on the right track. You don t need to become an athlete. But you do need a certain amount of brisk exercise to help strengthen your heart. If you have been diagnosed with a heart condition, your healthcare provider may advise exercise to help stabilize your condition. To help make exercise a habit, choose safe, fun activities.      Exercise with a friend. When activity is fun, you're more likely to stick with it.   Before you start  Check with your healthcare provider before starting an exercise program. This is especially important if you have not been active for a while. It's also important if you have a long-term (chronic) health problem such as heart disease, diabetes, or obesity. Or if you are at high risk for having these problems.   Why exercise?  Exercising regularly offers many healthy rewards. It can help you do all of the following:     Improve your blood cholesterol level to help prevent further heart trouble    Lower your blood pressure to help prevent a stroke or heart attack    Control diabetes, or reduce your risk of getting this disease    Improve your heart and lung function    Reach and stay at a healthy weight    Make your muscles stronger so you can stay active    Prevent falls and fractures by slowing the loss of bone  mass (osteoporosis)    Manage stress better    Reduce your blood pressure    Improve your sense of self and your body image  Exercise tips      Ease into your routine. Set small goals. Then build on them. If you are not sure what your activity level should be, talk with your healthcare provider first before starting an exercise routine.    Exercise on most days. Aim for a total of 150 minutes (2 hours and 30 minutes) or more of moderate-intensity aerobic activity each week. Or 75 minutes (1 hour and 15 minutes) or more of vigorous-intensity aerobic activity each week. Or try for a combination of both. Moderate activity means that you breathe heavier and your heart rate increases but you can still talk. Think about doing 40 minutes of moderate exercise, 3 to 4 times a week. For best results, activity should last for about 40 minutes to lower blood pressure and cholesterol. It's OK to work up to the 40-minute period over time. Examples of moderate-intensity activity are walking 1 mile in 15 minutes. Or doing 30 to 45 minutes of yard work.    Step up your daily activity level.  Along with your exercise program, try being more active the whole day. Walk instead of drive. Or park further away so that you take more steps each day. Do more household tasks or yard work. You may not be able to meet the advised mount of physical activity. But doing some moderate- or vigorous-intensity aerobic activity can help reduce your risk for heart disease. Your healthcare provider can help you figure out what is best for you.    Choose 1 or more activities you enjoy.  Walking is one of the easiest things you can do. You can also try swimming, riding a bike, dancing, or taking an exercise class.    When to call your healthcare provider  Call your healthcare provider if you have any of these:     Chest pain or feel dizzy or lightheaded    Burning, tightness, pressure, or heaviness in your chest, neck, shoulders, back, or arms    Abnormal  shortness of breath    More joint or muscle pain    A very fast or irregular heartbeat (palpitations)  [employer] last reviewed this educational content on 7/1/2019 2000-2021 [employer]. All rights reserved. This information is not intended as a substitute for professional medical care. Always follow your healthcare professional's instructions.          Signs of Hearing Loss      Hearing much better with one ear can be a sign of hearing loss.   Hearing loss is a problem shared by many people. In fact, it is one of the most common health problems, particularly as people age. Most people age 65 and older have some hearing loss. By age 80, almost everyone does. Hearing loss often occurs slowly over the years. So you may not realize your hearing has gotten worse.  Have your hearing checked  Call your healthcare provider if you:    Have to strain to hear normal conversation    Have to watch other people s faces very carefully to follow what they re saying    Need to ask people to repeat what they ve said    Often misunderstand what people are saying    Turn the volume of the television or radio up so high that others complain    Feel that people are mumbling when they re talking to you    Find that the effort to hear leaves you feeling tired and irritated    Notice, when using the phone, that you hear better with one ear than the other  [employer] last reviewed this educational content on 1/1/2020 2000-2021 [employer]. All rights reserved. This information is not intended as a substitute for professional medical care. Always follow your healthcare professional's instructions.          Urinary Incontinence (Male)    Urinary incontinence means not being able to control the release of urine from the bladder.   Causes  Common causes of urinary incontinence in men include:    Infection    Certain medicines    Aging    Poor pelvic muscle tone    Bladder spasms    Obesity    Trouble urinating and  fully emptying the bladder (urinary retention)  Other things that can cause incontinence are:     Nervous system diseases    Diabetes    Sleep apnea    Urinary tract infections    Prostate surgery    Pelvic injury  Constipation and smoking have also been identified as risk factors.   Symptoms    Urge incontinence (overactive bladder). This is a sudden urge to urinate. It occurs even though there may not be much urine in the bladder. The need to urinate often during the night is common. It's due to bladder spasms.    Stress incontinence. This is urine leakage that you can't control. It can occur with sneezing, coughing, and other actions that put stress on the bladder.    Treatment  Treatment depends on what is causing the condition. Bladder infections are treated with antibiotics. Urinary retention is treated with a bladder catheter.   Home care  Follow these guidelines when caring for yourself at home:    Don't have any foods and drinks that may irritate the bladder. This includes:  ? Chocolate  ? Alcohol  ? Caffeine  ? Carbonated drinks  ? Acidic fruits and juices    Limit fluids to 6 to 8 cups a day.    Lose weight if you are overweight. This will reduce your symptoms.    If advised, do regular pelvic muscle-strengthening exercises such as Kegel exercises.    If needed, wear absorbent pads to catch urine. Change the pads often. This is for good hygiene and to prevent skin and bladder infections.    Bathe daily for good hygiene.    If an antibiotic was prescribed to treat a bladder infection, take it until it's finished. Keep taking it even if you are feeling better. This is to make sure your infection has cleared.    If a catheter was left in place, keep bacteria from getting into the collection bag. Don't disconnect the catheter from the collection bag.    Use a leg band to secure the catheter drainage tube, so it does not pull on the catheter. Drain the collection bag when it becomes full. To do this, use the  drain spout at the bottom of the bag. Don't disconnect the bag from the catheter.    Don't pull on or try to remove a catheter. The catheter must be removed by a healthcare provider.    If you smoke, stop. Ask your provider for help if you can't do this on your own.  Follow-up care  Follow up with your healthcare provider, or as advised.  When to get medical advice  Call your healthcare provider right away if any of these occur:    Fever over 100.4 F (38 C), or as directed by your provider    Bladder pain or fullness    Belly swelling, nausea, or vomiting    Back pain    Weakness, dizziness, or fainting    If a catheter was left in place, return if:  ? The catheter falls out  ? The catheter stops draining for 6 hours  ? Your urine gets cloudy or smells bad  [employer] last reviewed this educational content on 1/1/2020 2000-2021 [employer]. All rights reserved. This information is not intended as a substitute for professional medical care. Always follow your healthcare professional's instructions.

## 2022-01-14 NOTE — PROGRESS NOTES
"SUBJECTIVE:   Daniel Jacobs is a 76 year old male who presents for Preventive Visit.      Patient has been advised of split billing requirements and indicates understanding: No     Healthy Habits:     In general, how would you rate your overall health?  Good    Frequency of exercise:  1 day/week    Duration of exercise:  Less than 15 minutes    Do you usually eat at least 4 servings of fruit and vegetables a day, include whole grains    & fiber and avoid regularly eating high fat or \"junk\" foods?  Yes    Taking medications regularly:  Yes    Medication side effects:  None    Ability to successfully perform activities of daily living:  No assistance needed    Home Safety:  No safety concerns identified    Hearing Impairment:  Difficulty following a conversation in a noisy restaurant or crowded room and difficulty understanding soft or whispered speech    In the past 6 months, have you been bothered by leaking of urine? Yes    In general, how would you rate your overall mental or emotional health?  Excellent      PHQ-2 Total Score: 0    Additional concerns today:  Yes  Above reviewed.  Noted no major symptoms today.  Reviewed her previous history.  Hoping to get a referral to cardiology for follow-up.  Had a cardiac event about 5 years ago and had a stent put in.  Had not been seen since then.  Do you feel safe in your environment? Yes    Have you ever done Advance Care Planning? (For example, a Health Directive, POLST, or a discussion with a medical provider or your loved ones about your wishes): No, advance care planning information given to patient to review.  Patient plans to discuss their wishes with loved ones or provider.      He had a recent hearing screening done, does have some slight reduction in his hearing but will not want to have any evaluation or intervention at this time.  Fall risk, no notable fall risk.  Fallen 2 or more times in the past year?: No  Any fall with injury in the past year?: " No  click delete button to remove this line now  Cognitive Screening   1) Repeat 3 items (Leader, Season, Table)    2) Clock draw: NORMAL  3) 3 item recall: Recalls 3 objects  Results: NORMAL clock, 1-2 items recalled: COGNITIVE IMPAIRMENT LESS LIKELY    Mini-CogTM Copyright JUAN Estevez. Licensed by the author for use in [facility]; reprinted with permission (andrew@[facility]). All rights reserved.      Do you have sleep apnea, excessive snoring or daytime drowsiness?: no    Reviewed and updated as needed this visit by clinical staff  Tobacco  Allergies  Meds             Reviewed and updated as needed this visit by Provider    Meds            Social History     Tobacco Use     Smoking status: Never Smoker     Smokeless tobacco: Never Used   Substance Use Topics     Alcohol use: No     If you drink alcohol do you typically have >3 drinks per day or >7 drinks per week? No    No flowsheet data found.    None.    Current providers sharing in care for this patient include:   Patient Care Team:  Christina Mims MD as PCP - General  Christina Mims MD as Assigned PCP    The following health maintenance items are reviewed in Epic and correct as of today:  Health Maintenance Due   Topic Date Due     ANNUAL REVIEW OF HM ORDERS  Never done     ZOSTER IMMUNIZATION (1 of 2) Never done     FALL RISK ASSESSMENT  12/22/2021               Review of Systems  CONSTITUTIONAL: NEGATIVE for fever, chills, change in weight  INTEGUMENTARY/SKIN: NEGATIVE for worrisome rashes, moles or lesions  EYES: NEGATIVE for vision changes or irritation  ENT/MOUTH: NEGATIVE for ear, mouth and throat problems  RESP: NEGATIVE for significant cough or SOB  BREAST: NEGATIVE for masses, tenderness or discharge  CV: NEGATIVE for chest pain, palpitations or peripheral edema  GI: NEGATIVE for nausea, abdominal pain, heartburn, or change in bowel habits  : NEGATIVE for frequency, dysuria, or hematuria  MUSCULOSKELETAL: NEGATIVE for  "significant arthralgias or myalgia  NEURO: NEGATIVE for weakness, dizziness or paresthesias  ENDOCRINE: NEGATIVE for temperature intolerance, skin/hair changes  HEME: NEGATIVE for bleeding problems  PSYCHIATRIC: NEGATIVE for changes in mood or affect    OBJECTIVE:   /62 (BP Location: Left arm, Patient Position: Sitting, Cuff Size: Adult Large)   Pulse 69   Ht 1.816 m (5' 11.5\")   Wt 104.5 kg (230 lb 6.4 oz)   SpO2 97%   BMI 31.69 kg/m   Estimated body mass index is 31.69 kg/m  as calculated from the following:    Height as of this encounter: 1.816 m (5' 11.5\").    Weight as of this encounter: 104.5 kg (230 lb 6.4 oz).  Physical Exam  GENERAL: healthy, alert and no distress  EYES: Eyes grossly normal to inspection, PERRL and conjunctivae and sclerae normal  HENT: ear canals and TM's normal, nose and mouth without ulcers or lesions  NECK: no adenopathy, no asymmetry, masses, or scars and thyroid normal to palpation  RESP: lungs clear to auscultation - no rales, rhonchi or wheezes  CV: regular rate and rhythm, normal S1 S2, no S3 or S4, no murmur, click or rub, no peripheral edema and peripheral pulses strong  ABDOMEN: soft, nontender, no hepatosplenomegaly, no masses and bowel sounds normal  MS: no gross musculoskeletal defects noted, no edema  SKIN: no suspicious lesions or rashes  NEURO: Normal strength and tone, mentation intact and speech normal  PSYCH: mentation appears normal, affect normal/bright      ASSESSMENT / PLAN:   Daniel was seen today for wellness visit.    Diagnoses and all orders for this visit:    Encounter for Medicare annual wellness exam  -     Lipid Profile (Chol, Trig, HDL, LDL calc); Future  -     Comprehensive metabolic panel (BMP + Alb, Alk Phos, ALT, AST, Total. Bili, TP); Future    Peripheral vascular disease (H)    S/P coronary artery stent placement  -     Adult Cardiology Eval Referral; Future    Malignant neoplasm of prostate (H)  -     PSA, tumor marker; Future  He is " "asymptomatic for any cardiovascular disease and no symptoms.  Because of the stent placement, I think it is necessary for him to be seen by the cardiologist for review.  Referral was put in.      Patient has been advised of split billing requirements and indicates understanding: Yes  COUNSELING:  Reviewed preventive health counseling, as reflected in patient instructions       Regular exercise       Healthy diet/nutrition    Estimated body mass index is 31.69 kg/m  as calculated from the following:    Height as of this encounter: 1.816 m (5' 11.5\").    Weight as of this encounter: 104.5 kg (230 lb 6.4 oz).    Weight management plan: Discussed healthy diet and exercise guidelines    He reports that he has never smoked. He has never used smokeless tobacco.      Appropriate preventive services were discussed with this patient, including applicable screening as appropriate for cardiovascular disease, diabetes, osteopenia/osteoporosis, and glaucoma.  As appropriate for age/gender, discussed screening for colorectal cancer, prostate cancer, breast cancer, and cervical cancer. Checklist reviewing preventive services available has been given to the patient.    Reviewed patients plan of care and provided an AVS. The Basic Care Plan (routine screening as documented in Health Maintenance) for Daniel meets the Care Plan requirement. This Care Plan has been established and reviewed with the Patient.    Counseling Resources:  ATP IV Guidelines  Pooled Cohorts Equation Calculator  Breast Cancer Risk Calculator  Breast Cancer: Medication to Reduce Risk  FRAX Risk Assessment  ICSI Preventive Guidelines  Dietary Guidelines for Americans, 2010  USDA's MyPlate  ASA Prophylaxis  Lung CA Screening    Christina Mims MD  [facility]    Identified Health Risks:  "

## 2022-01-14 NOTE — PROGRESS NOTES
"    He is at risk for lack of exercise and has been provided with information to increase physical activity for the benefit of his well-being.  The patient was provided with written information regarding signs of hearing loss.  Information on urinary incontinence and treatment options given to patient.  Answers for HPI/ROS submitted by the patient on 1/11/2022  In general, how would you rate your overall physical health?: good  Frequency of exercise:: 1 day/week  Do you usually eat at least 4 servings of fruit and vegetables a day, include whole grains & fiber, and avoid regularly eating high fat or \"junk\" foods? : Yes  Taking medications regularly:: Yes  Medication side effects:: None  Activities of Daily Living: no assistance needed  Home safety: no safety concerns identified  Hearing Impairment:: difficulty following a conversation in a noisy restaurant or crowded room, difficulty understanding soft or whispered speech  In the past 6 months, have you been bothered by leaking of urine?: Yes  In general, how would you rate your overall mental or emotional health?: excellent  Additional concerns today:: Yes  Duration of exercise:: Less than 15 minutes      "

## 2022-01-21 ENCOUNTER — LAB (OUTPATIENT)
Dept: LAB | Facility: CLINIC | Age: 77
End: 2022-01-21
Payer: COMMERCIAL

## 2022-01-21 DIAGNOSIS — Z00.00 ENCOUNTER FOR MEDICARE ANNUAL WELLNESS EXAM: ICD-10-CM

## 2022-01-21 DIAGNOSIS — C61 MALIGNANT NEOPLASM OF PROSTATE (H): ICD-10-CM

## 2022-01-21 LAB
ALBUMIN SERPL-MCNC: 3.5 G/DL (ref 3.5–5)
ALP SERPL-CCNC: 106 U/L (ref 45–120)
ALT SERPL W P-5'-P-CCNC: 22 U/L (ref 0–45)
ANION GAP SERPL CALCULATED.3IONS-SCNC: 10 MMOL/L (ref 5–18)
AST SERPL W P-5'-P-CCNC: 23 U/L (ref 0–40)
BILIRUB SERPL-MCNC: 0.7 MG/DL (ref 0–1)
BUN SERPL-MCNC: 17 MG/DL (ref 8–28)
CALCIUM SERPL-MCNC: 9 MG/DL (ref 8.5–10.5)
CHLORIDE BLD-SCNC: 106 MMOL/L (ref 98–107)
CHOLEST SERPL-MCNC: 120 MG/DL
CO2 SERPL-SCNC: 23 MMOL/L (ref 22–31)
CREAT SERPL-MCNC: 0.93 MG/DL (ref 0.7–1.3)
FASTING STATUS PATIENT QL REPORTED: YES
GFR SERPL CREATININE-BSD FRML MDRD: 86 ML/MIN/1.73M2
GLUCOSE BLD-MCNC: 101 MG/DL (ref 70–125)
HDLC SERPL-MCNC: 34 MG/DL
LDLC SERPL CALC-MCNC: 70 MG/DL
POTASSIUM BLD-SCNC: 4.6 MMOL/L (ref 3.5–5)
PROT SERPL-MCNC: 7 G/DL (ref 6–8)
PSA SERPL-MCNC: <0.1 UG/L (ref 0–6.5)
SODIUM SERPL-SCNC: 139 MMOL/L (ref 136–145)
TRIGL SERPL-MCNC: 78 MG/DL

## 2022-01-21 PROCEDURE — 80053 COMPREHEN METABOLIC PANEL: CPT

## 2022-01-21 PROCEDURE — 84153 ASSAY OF PSA TOTAL: CPT

## 2022-01-21 PROCEDURE — 36415 COLL VENOUS BLD VENIPUNCTURE: CPT

## 2022-01-21 PROCEDURE — 80061 LIPID PANEL: CPT

## 2022-03-23 ENCOUNTER — TRANSFERRED RECORDS (OUTPATIENT)
Dept: HEALTH INFORMATION MANAGEMENT | Facility: CLINIC | Age: 77
End: 2022-03-23
Payer: COMMERCIAL

## 2022-03-30 ENCOUNTER — OFFICE VISIT (OUTPATIENT)
Dept: CARDIOLOGY | Facility: CLINIC | Age: 77
End: 2022-03-30
Attending: FAMILY MEDICINE
Payer: COMMERCIAL

## 2022-03-30 VITALS
BODY MASS INDEX: 32.6 KG/M2 | WEIGHT: 240.7 LBS | HEART RATE: 72 BPM | HEIGHT: 72 IN | RESPIRATION RATE: 20 BRPM | SYSTOLIC BLOOD PRESSURE: 120 MMHG | DIASTOLIC BLOOD PRESSURE: 66 MMHG

## 2022-03-30 DIAGNOSIS — Z95.5 S/P CORONARY ARTERY STENT PLACEMENT: ICD-10-CM

## 2022-03-30 DIAGNOSIS — Z13.220 ENCOUNTER FOR SCREENING FOR LIPOID DISORDERS: ICD-10-CM

## 2022-03-30 DIAGNOSIS — I42.9 CARDIOMYOPATHY, UNSPECIFIED TYPE (H): ICD-10-CM

## 2022-03-30 DIAGNOSIS — R07.89 OTHER CHEST PAIN: ICD-10-CM

## 2022-03-30 PROCEDURE — 99204 OFFICE O/P NEW MOD 45 MIN: CPT | Performed by: INTERNAL MEDICINE

## 2022-03-30 RX ORDER — ATORVASTATIN CALCIUM 80 MG/1
80 TABLET, FILM COATED ORAL DAILY
Qty: 90 TABLET | Refills: 3 | Status: SHIPPED | OUTPATIENT
Start: 2022-03-30 | End: 2023-05-16

## 2022-03-30 NOTE — LETTER
3/30/2022    Christina Mims MD  [address] [facility] [address]    RE: Daniel Jacobs       Dear Colleague,     I had the pleasure of seeing Daniel Jacobs in the [facility].    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     # CAD - s/p STEMI and PCI to LAD w/ DESx2 and PTA to D1; cardiomyopathy EF 45 w/ LAD WMA resolved per OSH records; normal Nuc '18  - continue ASA, metop, increase atorva to 80  - re-check an echo, embark on exercise regimen - if NYE resolves w/ conditioning and weight loss, may focus on prevention. 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    His cardiac history dates back to 217, when he woke up w/ CP and was rushed to the [facility] where he was diagnosed w/ STEMI and underwent emergent angiography and intervention to LAD w/ aspiration thrombectomy, PTA, and DESx2, and PTA to D1. He had transient cardiomyopathy w / LVEF 45 and reported LAD WMA, which had since resolved. He had a normal stress Nuc in '18. Since then, he has had no recurrence of symptoms and presents here to establish CV care on advice of his PCP.    He denies CP,  SOB, + mild NYE but wonders if he is a bit out of shape, no orthopnea/PND/edema/syncope/N/V/D/F/C. Gained ~ 40 lbs in the past 2 years.    Last LDL 70, HDL 34    Works part time at a [employer] store, does work around the house, never smoker or a drinker.    Father dies of a heart attack at a young age (early 40's) in 1952.    OSH Nuc '18:  1.  Good exercise tolerance.    2.  There was no ECG evidence diagnostic of ischemia.    3.  Normal exercise stress perfusion imaging study with inferior diaphragmatic attenuation   artifact    4.  LV systolic function was normal without regional wall motion abnormalities.    5.  LVEF is calculated to be 56%.     Recent Echocardiogram Results OS 03/17:   1.  Normal left ventricular systolic function.  Calculated left ventricular ejection fraction   (modified Wyatt technique) is 59 %.  No    regional wall motion abnormalities.    2. Normal right ventricular size and function.    3. No significant valvular heart disease noted.      In comparison to the prior echo images from 2/5/2017 the left ventricular systolic function   has normalized and the regional wall motion       Recent Coronary Angiogram Results 02/17 [facility]:  ? STEMI   DIAGNOSTIC - CORONARY   ? Right dominant coronary artery system   ? The left main artery has minimal disease.   ? Acute total occlusion of the mid LAD   ? 99% stenosis in the ostial 1st Diagonal   ? The circumflex artery has minimal disease.   ? The RCA has minimal disease.   INTERVENTION   ? Successful angioplasty and stenting (drug eluting) of the LAD   ? Successful angioplasty of the proximal 1st diagonal   ? A thrombectomy catheter was used for thrombus extraction during the intervention   ? JOY 0 flow pre and JOY 3 flow post.          Physical Examination  Review of Systems   Vitals: /66 (BP Location: Right arm, Patient Position: Sitting, Cuff Size: Adult Large)   Pulse 72   Resp 20   Ht 1.829 m (6')   Wt 109.2 kg (240 lb 11.2  oz)   BMI 32.64 kg/m    BMI= Body mass index is 32.64 kg/m .  Wt Readings from Last 3 Encounters:   03/30/22 109.2 kg (240 lb 11.2 oz)   01/14/22 104.5 kg (230 lb 6.4 oz)   12/29/21 102.1 kg (225 lb)       General Appearance:   no distress, normal body habitus   ENT/Mouth: membranes moist, no oral lesions or bleeding gums.      EYES:  no scleral icterus, normal conjunctivae   Neck: no carotid bruits or thyromegaly   Chest/Lungs:   lungs are clear to auscultation, no rales or wheezing, no sternal scar, equal chest wall expansion    Cardiovascular:   Regular. Normal first and second heart sounds with no murmurs, rubs, or gallops; the carotid, radial and posterior tibial pulses are intact, Jugular venous pressure 6, no edema bilaterally    Abdomen:  no organomegaly, masses, bruits, or tenderness; bowel sounds are present   Extremities: no cyanosis or clubbing   Skin: no xanthelasma, warm.    Neurologic: normal  bilateral, no tremors     Psychiatric: alert and oriented x3, calm        Please refer above for cardiac ROS details.        Medical History  Surgical History Family History Social History   Past Medical History:   Diagnosis Date     Cardiac abnormality      Low back pain      Prostate cancer (H) 2007     Past Surgical History:   Procedure Laterality Date     HERNIA REPAIR       INSERT / REPLACE / REMOVE PROSTHESIS URETHRAL SPHINCTER N/A 6/23/2014    Procedure: ARTIFICIAL URINARY SPHINCTER INSERTION;  Surgeon: Torres Sanabria MD;  Location: [facility];  Service:      INSERT / REPLACE / REMOVE PROSTHESIS URETHRAL SPHINCTER N/A 2/16/2015    Procedure: REMOVAL/REPLACEMENT OF ARTIFICIAL URINARY SPHINCTER COMPONENT;  Surgeon: Torres Sanabria MD;  Location: [facility];  Service:      LUMBAR SPINE SURGERY  1981     OTHER SURGICAL HISTORY      prostatetomy     Family History   Problem Relation Age of Onset     Prostate Cancer Brother         Social History     Socioeconomic History     Marital  status:      Spouse name: Not on file     Number of children: Not on file     Years of education: Not on file     Highest education level: Not on file   Occupational History     Not on file   Tobacco Use     Smoking status: Never Smoker     Smokeless tobacco: Never Used   Substance and Sexual Activity     Alcohol use: No     Drug use: No     Sexual activity: Not on file   Other Topics Concern     Not on file   Social History Narrative     Not on file     Social Determinants of Health     Financial Resource Strain: Not on file   Food Insecurity: Not on file   Transportation Needs: Not on file   Physical Activity: Not on file   Stress: Not on file   Social Connections: Not on file   Intimate Partner Violence: Not on file   Housing Stability: Not on file           Medications  Allergies   Current Outpatient Medications   Medication Sig Dispense Refill     aspirin 81 MG tablet [ASPIRIN 81 MG TABLET] Take 81 mg by mouth daily.       atorvastatin (LIPITOR) 40 MG tablet [ATORVASTATIN (LIPITOR) 40 MG TABLET] TAKE 1 TABLET DAILY 90 tablet 3     LUTEIN EXTRACT-ZEAXANTHIN EXT ORAL [LUTEIN EXTRACT-ZEAXANTHIN EXT ORAL] Take 1 tablet by mouth daily.       metoprolol tartrate (LOPRESSOR) 25 MG tablet Take 1 tablet (25 mg) by mouth 2 times daily (Patient taking differently: Take 12.5 mg by mouth 2 times daily ) 90 tablet 2     nitroGLYcerin (NITROSTAT) 0.4 MG sublingual tablet Place 0.4 mg under the tongue every 5 minutes as needed for chest pain For chest pain place 1 tablet under the tongue every 5 minutes for 3 doses. If symptoms persist 5 minutes after 1st dose call 911.       OMEGA-3/DHA/EPA/FISH OIL (FISH OIL-OMEGA-3 FATTY ACIDS) 300-1,000 mg capsule [OMEGA-3/DHA/EPA/FISH OIL (FISH OIL-OMEGA-3 FATTY ACIDS) 300-1,000 MG CAPSULE] Take 2 g by mouth daily.         Allergies   Allergen Reactions     Augmentin Anaphylaxis     Benzocaine Unknown     Other reaction(s): Throat Swelling/Closing, Throat swelling      Codeine-Guaifenesin Anaphylaxis     Has tolerated vicodin and percocet w/o issue.          Lab Results    Chemistry/lipid CBC Cardiac Enzymes/BNP/TSH/INR   Recent Labs   Lab Test 01/21/22  0806   CHOL 120   HDL 34*   LDL 70   TRIG 78     Recent Labs   Lab Test 01/21/22  0806 12/22/20  1154 03/25/19  1140   LDL 70 70 75     Recent Labs   Lab Test 01/21/22  0806      POTASSIUM 4.6   CHLORIDE 106   CO2 23      BUN 17   CR 0.93   GFRESTIMATED 86   TRENTON 9.0     Recent Labs   Lab Test 01/21/22  0806 12/22/20  1154 03/25/19  1140   CR 0.93 0.96 0.83     Recent Labs   Lab Test 10/08/14  0759 07/03/14  0831   A1C 5.7 5.6          No results for input(s): WBC, HGB, HCT, MCV, PLT in the last 51524 hours.  No results for input(s): HGB in the last 04645 hours. No results for input(s): TROPONINI in the last 19242 hours.  No results for input(s): BNP, NTBNPI, NTBNP in the last 74603 hours.  No results for input(s): TSH in the last 51891 hours.  No results for input(s): INR in the last 64035 hours.     Ej Gonsalez MD    Thank you for allowing me to participate in the care of your patient.      Sincerely,   Ej Gonsalez MD   [facility]  cc:   Christina Mims MD  [address] [place] [address]

## 2022-03-30 NOTE — PROGRESS NOTES
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     # CAD - s/p STEMI and PCI to LAD w/ DESx2 and PTA to D1; cardiomyopathy EF 45 w/ LAD WMA resolved per OSH records; normal Nuc '18  - continue ASA, metop, increase atorva to 80  - re-check an echo, embark on exercise regimen - if NYE resolves w/ conditioning and weight loss, may focus on prevention. 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    His cardiac history dates back to 217, when he woke up w/ CP and was rushed to the [facility] where he was diagnosed w/ STEMI and underwent emergent angiography and intervention to LAD w/ aspiration thrombectomy, PTA, and DESx2, and PTA to D1. He had transient cardiomyopathy w / LVEF 45 and reported LAD WMA, which had since resolved. He had a normal stress Nuc in '18. Since then, he has had no recurrence of symptoms and presents here to establish CV care on advice of his PCP.    He denies CP, SOB, + mild NYE but wonders if he is a bit out of shape, no orthopnea/PND/edema/syncope/N/V/D/F/C. Gained ~ 40 lbs in the past 2 years.    Last LDL 70, HDL 34    Works part time at a hardware store, does  work around the house, never smoker or a drinker.    Father dies of a heart attack at a young age (early 40's) in 1952.    OSH Nuc '18:  1.  Good exercise tolerance.    2.  There was no ECG evidence diagnostic of ischemia.    3.  Normal exercise stress perfusion imaging study with inferior diaphragmatic attenuation   artifact    4.  LV systolic function was normal without regional wall motion abnormalities.    5.  LVEF is calculated to be 56%.     Recent Echocardiogram Results OS 03/17:   1.  Normal left ventricular systolic function.  Calculated left ventricular ejection fraction   (modified Wyatt technique) is 59 %.  No    regional wall motion abnormalities.    2. Normal right ventricular size and function.    3. No significant valvular heart disease noted.      In comparison to the prior echo images from 2/5/2017 the left ventricular systolic function   has normalized and the regional wall motion       Recent Coronary Angiogram Results 02/17 [facility]:  ? STEMI   DIAGNOSTIC - CORONARY   ? Right dominant coronary artery system   ? The left main artery has minimal disease.   ? Acute total occlusion of the mid LAD   ? 99% stenosis in the ostial 1st Diagonal   ? The circumflex artery has minimal disease.   ? The RCA has minimal disease.   INTERVENTION   ? Successful angioplasty and stenting (drug eluting) of the LAD   ? Successful angioplasty of the proximal 1st diagonal   ? A thrombectomy catheter was used for thrombus extraction during the intervention   ? JOY 0 flow pre and JOY 3 flow post.          Physical Examination  Review of Systems   Vitals: /66 (BP Location: Right arm, Patient Position: Sitting, Cuff Size: Adult Large)   Pulse 72   Resp 20   Ht 1.829 m (6')   Wt 109.2 kg (240 lb 11.2 oz)   BMI 32.64 kg/m    BMI= Body mass index is 32.64 kg/m .  Wt Readings from Last 3 Encounters:   03/30/22 109.2 kg (240 lb 11.2 oz)   01/14/22 104.5 kg (230 lb 6.4 oz)   12/29/21 102.1 kg (225 lb)        General Appearance:   no distress, normal body habitus   ENT/Mouth: membranes moist, no oral lesions or bleeding gums.      EYES:  no scleral icterus, normal conjunctivae   Neck: no carotid bruits or thyromegaly   Chest/Lungs:   lungs are clear to auscultation, no rales or wheezing, no sternal scar, equal chest wall expansion    Cardiovascular:   Regular. Normal first and second heart sounds with no murmurs, rubs, or gallops; the carotid, radial and posterior tibial pulses are intact, Jugular venous pressure 6, no edema bilaterally    Abdomen:  no organomegaly, masses, bruits, or tenderness; bowel sounds are present   Extremities: no cyanosis or clubbing   Skin: no xanthelasma, warm.    Neurologic: normal  bilateral, no tremors     Psychiatric: alert and oriented x3, calm        Please refer above for cardiac ROS details.        Medical History  Surgical History Family History Social History   Past Medical History:   Diagnosis Date     Cardiac abnormality      Low back pain      Prostate cancer (H) 2007     Past Surgical History:   Procedure Laterality Date     HERNIA REPAIR       INSERT / REPLACE / REMOVE PROSTHESIS URETHRAL SPHINCTER N/A 6/23/2014    Procedure: ARTIFICIAL URINARY SPHINCTER INSERTION;  Surgeon: Torres Sanabria MD;  Location: [facility];  Service:      INSERT / REPLACE / REMOVE PROSTHESIS URETHRAL SPHINCTER N/A 2/16/2015    Procedure: REMOVAL/REPLACEMENT OF ARTIFICIAL URINARY SPHINCTER COMPONENT;  Surgeon: Torres Sanabria MD;  Location: [facility];  Service:      LUMBAR SPINE SURGERY  1981     OTHER SURGICAL HISTORY      prostatetomy     Family History   Problem Relation Age of Onset     Prostate Cancer Brother         Social History     Socioeconomic History     Marital status:      Spouse name: Not on file     Number of children: Not on file     Years of education: Not on file     Highest education level: Not on file   Occupational History     Not on file   Tobacco  Use     Smoking status: Never Smoker     Smokeless tobacco: Never Used   Substance and Sexual Activity     Alcohol use: No     Drug use: No     Sexual activity: Not on file   Other Topics Concern     Not on file   Social History Narrative     Not on file     Social Determinants of Health     Financial Resource Strain: Not on file   Food Insecurity: Not on file   Transportation Needs: Not on file   Physical Activity: Not on file   Stress: Not on file   Social Connections: Not on file   Intimate Partner Violence: Not on file   Housing Stability: Not on file           Medications  Allergies   Current Outpatient Medications   Medication Sig Dispense Refill     aspirin 81 MG tablet [ASPIRIN 81 MG TABLET] Take 81 mg by mouth daily.       atorvastatin (LIPITOR) 40 MG tablet [ATORVASTATIN (LIPITOR) 40 MG TABLET] TAKE 1 TABLET DAILY 90 tablet 3     LUTEIN EXTRACT-ZEAXANTHIN EXT ORAL [LUTEIN EXTRACT-ZEAXANTHIN EXT ORAL] Take 1 tablet by mouth daily.       metoprolol tartrate (LOPRESSOR) 25 MG tablet Take 1 tablet (25 mg) by mouth 2 times daily (Patient taking differently: Take 12.5 mg by mouth 2 times daily ) 90 tablet 2     nitroGLYcerin (NITROSTAT) 0.4 MG sublingual tablet Place 0.4 mg under the tongue every 5 minutes as needed for chest pain For chest pain place 1 tablet under the tongue every 5 minutes for 3 doses. If symptoms persist 5 minutes after 1st dose call 911.       OMEGA-3/DHA/EPA/FISH OIL (FISH OIL-OMEGA-3 FATTY ACIDS) 300-1,000 mg capsule [OMEGA-3/DHA/EPA/FISH OIL (FISH OIL-OMEGA-3 FATTY ACIDS) 300-1,000 MG CAPSULE] Take 2 g by mouth daily.         Allergies   Allergen Reactions     Augmentin Anaphylaxis     Benzocaine Unknown     Other reaction(s): Throat Swelling/Closing, Throat swelling     Codeine-Guaifenesin Anaphylaxis     Has tolerated vicodin and percocet w/o issue.          Lab Results    Chemistry/lipid CBC Cardiac Enzymes/BNP/TSH/INR   Recent Labs   Lab Test 01/21/22  0806   CHOL 120   HDL 34*   LDL  70   TRIG 78     Recent Labs   Lab Test 01/21/22  0806 12/22/20  1154 03/25/19  1140   LDL 70 70 75     Recent Labs   Lab Test 01/21/22  0806      POTASSIUM 4.6   CHLORIDE 106   CO2 23      BUN 17   CR 0.93   GFRESTIMATED 86   TRENTON 9.0     Recent Labs   Lab Test 01/21/22  0806 12/22/20  1154 03/25/19  1140   CR 0.93 0.96 0.83     Recent Labs   Lab Test 10/08/14  0759 07/03/14  0831   A1C 5.7 5.6          No results for input(s): WBC, HGB, HCT, MCV, PLT in the last 49717 hours.  No results for input(s): HGB in the last 62503 hours. No results for input(s): TROPONINI in the last 64678 hours.  No results for input(s): BNP, NTBNPI, NTBNP in the last 38207 hours.  No results for input(s): TSH in the last 90384 hours.  No results for input(s): INR in the last 88285 hours.     Ej Gonsalez MD

## 2022-03-30 NOTE — PATIENT INSTRUCTIONS
It is great to meet you today!    As we have discussed, I'm glad to hear that you appear to still be doing well. Given your mild SOB on exertion, let's re-check an echo, embark on exercise regimen - if SOB resolves w/ conditioning and weight loss, may focus on prevention. Otherwise, consider exercise stress echo    As for your cholesterol control, guidelines would recommend highest tolerated dose of statin (given your history of a heart attack), as they could improve lipid control, as well as stabilize any existing plaque via weak anti-inflammatory action.    To that end, would increase atorva to 80, re-check labs in 2 mos    F/u in 1 year or as needed

## 2022-04-08 ENCOUNTER — TELEPHONE (OUTPATIENT)
Dept: CARDIOLOGY | Facility: CLINIC | Age: 77
End: 2022-04-08

## 2022-04-08 ENCOUNTER — HOSPITAL ENCOUNTER (OUTPATIENT)
Dept: CARDIOLOGY | Facility: CLINIC | Age: 77
Discharge: HOME | End: 2022-04-08
Attending: INTERNAL MEDICINE | Admitting: INTERNAL MEDICINE
Payer: COMMERCIAL

## 2022-04-08 DIAGNOSIS — I42.9 CARDIOMYOPATHY, UNSPECIFIED TYPE (H): ICD-10-CM

## 2022-04-08 DIAGNOSIS — Z95.5 S/P CORONARY ARTERY STENT PLACEMENT: ICD-10-CM

## 2022-04-08 PROCEDURE — 93306 TTE W/DOPPLER COMPLETE: CPT | Mod: 26 | Performed by: INTERNAL MEDICINE

## 2022-04-08 PROCEDURE — 93306 TTE W/DOPPLER COMPLETE: CPT

## 2022-04-08 NOTE — TELEPHONE ENCOUNTER
LM for patient to return call. [facility]     ----- Message from Ej Gonsalez MD sent at 4/8/2022 11:45 AM CDT -----  Faby Hdez looks very reasuring, if NYE resolves w/ conditioning and weight loss, may focus on prevention. F/u in 1 year or as needed.    Thx!  Ej

## 2022-04-11 DIAGNOSIS — Z95.5 S/P CORONARY ARTERY STENT PLACEMENT: Primary | ICD-10-CM

## 2022-04-11 NOTE — TELEPHONE ENCOUNTER
Phone call to patient with results and recommendations. Encouraged patient to continue working on diet and exercise but to return call to [facility] if NYE were to persist or return. Plan for follow-up in one year or sooner if patient has concerns. Patient verbalized understanding, no further questions or concerns at this time. Follow-up order placed. [facility]

## 2022-04-14 ENCOUNTER — OFFICE VISIT (OUTPATIENT)
Dept: FAMILY MEDICINE | Facility: CLINIC | Age: 77
End: 2022-04-14
Payer: COMMERCIAL

## 2022-04-14 VITALS
HEART RATE: 72 BPM | TEMPERATURE: 98.1 F | SYSTOLIC BLOOD PRESSURE: 122 MMHG | DIASTOLIC BLOOD PRESSURE: 66 MMHG | BODY MASS INDEX: 32.4 KG/M2 | WEIGHT: 238.9 LBS

## 2022-04-14 DIAGNOSIS — R05.9 COUGH: Primary | ICD-10-CM

## 2022-04-14 DIAGNOSIS — J02.9 SORE THROAT: ICD-10-CM

## 2022-04-14 LAB
DEPRECATED S PYO AG THROAT QL EIA: NEGATIVE
FLUAV AG SPEC QL IA: NEGATIVE
FLUBV AG SPEC QL IA: NEGATIVE

## 2022-04-14 PROCEDURE — U0003 INFECTIOUS AGENT DETECTION BY NUCLEIC ACID (DNA OR RNA); SEVERE ACUTE RESPIRATORY SYNDROME CORONAVIRUS 2 (SARS-COV-2) (CORONAVIRUS DISEASE [COVID-19]), AMPLIFIED PROBE TECHNIQUE, MAKING USE OF HIGH THROUGHPUT TECHNOLOGIES AS DESCRIBED BY CMS-2020-01-R: HCPCS | Performed by: FAMILY MEDICINE

## 2022-04-14 PROCEDURE — 99213 OFFICE O/P EST LOW 20 MIN: CPT | Mod: CS | Performed by: FAMILY MEDICINE

## 2022-04-14 PROCEDURE — 87804 INFLUENZA ASSAY W/OPTIC: CPT | Performed by: FAMILY MEDICINE

## 2022-04-14 PROCEDURE — U0005 INFEC AGEN DETEC AMPLI PROBE: HCPCS | Performed by: FAMILY MEDICINE

## 2022-04-14 PROCEDURE — 87651 STREP A DNA AMP PROBE: CPT | Performed by: FAMILY MEDICINE

## 2022-04-14 NOTE — PROGRESS NOTES
ASSESSMENT/PLAN:  Daniel was seen today for cough and pharyngitis.    Diagnoses and all orders for this visit:    Cough, sore throat  Negative rapid strep  Flu and covid pending  Examination was unremarkable today.  The patient may continue with OTC symptomatic treatment.  Rest, hydration, nutritional support, and time were counseled.  Follow up with primary care provider or me if the patient is not better in 1-2 weeks.  The patient verbalized understanding and agreed with the plan.  -     Influenza A & B Antigen - Clinic Collect  -     Symptomatic; Yes; 4/10/2022 COVID-19 Virus (Coronavirus) by PCR; Future  -     Streptococcus A Rapid Screen w/Reflex to PCR - Clinic Collect    SUBJECTIVE:    Daniel Jacobs is a 77 year old male who came in today     4 days of cough, ST, phlegm in throat, chills, feverish  IBU, Vicks application, steam   No documented fever  No wheezing, no SOB, no facial pain/pressure  No known COVID exposure  Negative COVID tests x 2  Fully vaccinated for COVID    Review of Systems (except those mentioned above)  Constitutional: Negative.   HENT: Negative.   Eyes: Negative.   Respiratory: Negative.   Cardiovascular: Negative.   Gastrointestinal: Negative.   Endocrine: Negative.   Genitourinary: Negative.   Musculoskeletal: Negative.   Skin: Negative.   Allergic/Immunologic: Negative.   Neurological: Negative.   Hematological: Negative.   Psychiatric/Behavioral: Negative.     Patient Active Problem List    Diagnosis Date Noted     Peripheral vascular disease (H) 12/22/2020     Priority: Medium     Urinary Incontinence      Priority: Medium     Created by Conversion         Prostate Cancer      Priority: Medium     Created by [facility] Annotation: Sep 11 2008  7:37AM - Torres Arreaga: 2007         Nephrolithiasis      Priority: Medium     Created by [facility] Annotation: Nov 5 2012  1:22PM - Janet Belle: two   episodes-spontaneous         Lower Back Pain       Priority: Medium     Created by Conversion         Allergies   Allergen Reactions     Augmentin Anaphylaxis     Benzocaine Unknown     Other reaction(s): Throat Swelling/Closing, Throat swelling     Codeine-Guaifenesin Anaphylaxis     Has tolerated vicodin and percocet w/o issue.     Current Outpatient Medications   Medication Sig Dispense Refill     aspirin 81 MG tablet [ASPIRIN 81 MG TABLET] Take 81 mg by mouth daily.       atorvastatin (LIPITOR) 80 MG tablet Take 1 tablet (80 mg) by mouth daily 90 tablet 3     LUTEIN EXTRACT-ZEAXANTHIN EXT ORAL [LUTEIN EXTRACT-ZEAXANTHIN EXT ORAL] Take 1 tablet by mouth daily.       metoprolol tartrate (LOPRESSOR) 25 MG tablet Take 1 tablet (25 mg) by mouth 2 times daily (Patient taking differently: Take 12.5 mg by mouth 2 times daily) 90 tablet 2     OMEGA-3/DHA/EPA/FISH OIL (FISH OIL-OMEGA-3 FATTY ACIDS) 300-1,000 mg capsule [OMEGA-3/DHA/EPA/FISH OIL (FISH OIL-OMEGA-3 FATTY ACIDS) 300-1,000 MG CAPSULE] Take 2 g by mouth daily.       nitroGLYcerin (NITROSTAT) 0.4 MG sublingual tablet Place 0.4 mg under the tongue every 5 minutes as needed for chest pain For chest pain place 1 tablet under the tongue every 5 minutes for 3 doses. If symptoms persist 5 minutes after 1st dose call 911. (Patient not taking: Reported on 4/14/2022)       Past Medical History:   Diagnosis Date     Cardiac abnormality      Low back pain      Prostate cancer (H) 2007     Past Surgical History:   Procedure Laterality Date     HERNIA REPAIR       INSERT / REPLACE / REMOVE PROSTHESIS URETHRAL SPHINCTER N/A 6/23/2014    Procedure: ARTIFICIAL URINARY SPHINCTER INSERTION;  Surgeon: Torres Sanabria MD;  Location: [facility] OR;  Service:      INSERT / REPLACE / REMOVE PROSTHESIS URETHRAL SPHINCTER N/A 2/16/2015    Procedure: REMOVAL/REPLACEMENT OF ARTIFICIAL URINARY SPHINCTER COMPONENT;  Surgeon: Torres Sanbaria MD;  Location: [facility] OR;  Service:      LUMBAR SPINE SURGERY  1981     OTHER SURGICAL  HISTORY      prostatetomy     Social History     Socioeconomic History     Marital status:      Spouse name: None     Number of children: None     Years of education: None     Highest education level: None   Tobacco Use     Smoking status: Never Smoker     Smokeless tobacco: Never Used   Substance and Sexual Activity     Alcohol use: No     Drug use: No     Family History   Problem Relation Age of Onset     Prostate Cancer Brother          OBJECTIVE:    Vitals:    04/14/22 1445   BP: 122/66   Pulse: 72   Temp: 98.1  F (36.7  C)   TempSrc: Oral   Weight: 108.4 kg (238 lb 14.4 oz)     Body mass index is 32.4 kg/m .    Physical Exam:  Constitutional: Patient was oriented to person, place, and time. Patient appeared well-developed and well-nourished. No distress.   Head: Normocephalic and atraumatic.   Right Ear: External ear normal. Normal TM  Left Ear: External ear normal. Normal TM  Nose: Nose normal.   Mouth/Throat: Oropharynx was clear and moist. No oropharyngeal exudate.   Eyes: Conjunctivae and EOM were normal. Pupils were equal, round, and reactive to light. Right eye exhibited no discharge. Left eye exhibited no discharge. No scleral icterus.   Neck: Neck supple. No JVD present. No tracheal deviation present. No thyromegaly present.   Lymphadenopathy:  Patient has no cervical adenopathy.   Cardiovascular: Normal rate, regular rhythm, normal heart sounds and intact distal pulses. No murmur heard.   Pulmonary/Chest: Effort normal and breath sounds normal. No stridor. No respiratory distress. Patient had no wheezes, no rales, exhibits no tenderness.   Neurological: Patient was alert and oriented to person, place, and time. Coordination normal.   Skin: Skin was warm and dry. No rash noted. Patient was not diaphoretic. No erythema. No pallor.       No results found for any visits on 04/14/22.

## 2022-04-15 LAB
GROUP A STREP BY PCR: NOT DETECTED
SARS-COV-2 RNA RESP QL NAA+PROBE: NEGATIVE

## 2022-05-10 ENCOUNTER — TRANSFERRED RECORDS (OUTPATIENT)
Dept: HEALTH INFORMATION MANAGEMENT | Facility: CLINIC | Age: 77
End: 2022-05-10
Payer: COMMERCIAL

## 2022-05-27 ENCOUNTER — LAB (OUTPATIENT)
Dept: CARDIOLOGY | Facility: CLINIC | Age: 77
End: 2022-05-27
Payer: COMMERCIAL

## 2022-05-27 DIAGNOSIS — R07.89 OTHER CHEST PAIN: ICD-10-CM

## 2022-05-27 DIAGNOSIS — Z95.5 S/P CORONARY ARTERY STENT PLACEMENT: ICD-10-CM

## 2022-05-27 LAB
ALBUMIN SERPL-MCNC: 3.7 G/DL (ref 3.5–5)
ALP SERPL-CCNC: 101 U/L (ref 45–120)
ALT SERPL W P-5'-P-CCNC: 23 U/L (ref 0–45)
AST SERPL W P-5'-P-CCNC: 25 U/L (ref 0–40)
BILIRUB DIRECT SERPL-MCNC: 0.3 MG/DL
BILIRUB SERPL-MCNC: 0.7 MG/DL (ref 0–1)
CHOLEST SERPL-MCNC: 109 MG/DL
FASTING STATUS PATIENT QL REPORTED: YES
HDLC SERPL-MCNC: 33 MG/DL
LDLC SERPL CALC-MCNC: 63 MG/DL
PROT SERPL-MCNC: 7.2 G/DL (ref 6–8)
TRIGL SERPL-MCNC: 66 MG/DL

## 2022-05-27 PROCEDURE — 80061 LIPID PANEL: CPT | Performed by: INTERNAL MEDICINE

## 2022-05-27 PROCEDURE — 80076 HEPATIC FUNCTION PANEL: CPT | Performed by: INTERNAL MEDICINE

## 2022-05-27 PROCEDURE — 36415 COLL VENOUS BLD VENIPUNCTURE: CPT | Performed by: INTERNAL MEDICINE

## 2022-06-06 ENCOUNTER — TRANSFERRED RECORDS (OUTPATIENT)
Dept: HEALTH INFORMATION MANAGEMENT | Facility: CLINIC | Age: 77
End: 2022-06-06
Payer: COMMERCIAL

## 2022-06-19 DIAGNOSIS — Z95.5 S/P CORONARY ARTERY STENT PLACEMENT: ICD-10-CM

## 2022-06-20 RX ORDER — METOPROLOL TARTRATE 25 MG/1
TABLET, FILM COATED ORAL
Qty: 90 TABLET | Refills: 0 | Status: SHIPPED | OUTPATIENT
Start: 2022-06-20 | End: 2022-07-28

## 2022-06-20 NOTE — TELEPHONE ENCOUNTER
"Routing refill request to provider for review/approval because:  Pt reports is taking 12.5 mg 2 times daily    Last Written Prescription Date:  8/4/21  Last Fill Quantity: 90,  # refills: 2   Last office visit provider:  4/14/22     Requested Prescriptions   Pending Prescriptions Disp Refills     metoprolol tartrate (LOPRESSOR) 25 MG tablet [Pharmacy Med Name: METOPROLOL TARTRATE TABS 25MG] 90 tablet 7     Sig: TAKE 1 TABLET TWICE A DAY       Beta-Blockers Protocol Passed - 6/19/2022  7:42 PM        Passed - Blood pressure under 140/90 in past 12 months     BP Readings from Last 3 Encounters:   04/14/22 122/66   03/30/22 120/66   01/14/22 100/62                 Passed - Patient is age 6 or older        Passed - Recent (12 mo) or future (30 days) visit within the authorizing provider's specialty     Patient has had an office visit with the authorizing provider or a provider within the authorizing providers department within the previous 12 mos or has a future within next 30 days. See \"Patient Info\" tab in inbasket, or \"Choose Columns\" in Meds & Orders section of the refill encounter.              Passed - Medication is active on med Anna Hassan RN 06/19/22 8:56 PM  "

## 2022-07-28 ENCOUNTER — VIRTUAL VISIT (OUTPATIENT)
Dept: FAMILY MEDICINE | Facility: CLINIC | Age: 77
End: 2022-07-28

## 2022-07-28 DIAGNOSIS — R32 URINARY INCONTINENCE, UNSPECIFIED TYPE: ICD-10-CM

## 2022-07-28 DIAGNOSIS — Z85.46 HISTORY OF PROSTATE CANCER: ICD-10-CM

## 2022-07-28 DIAGNOSIS — N20.0 CALCULUS OF KIDNEY: ICD-10-CM

## 2022-07-28 DIAGNOSIS — U07.1 INFECTION DUE TO 2019 NOVEL CORONAVIRUS: Primary | ICD-10-CM

## 2022-07-28 DIAGNOSIS — Z95.5 S/P CORONARY ARTERY STENT PLACEMENT: ICD-10-CM

## 2022-07-28 DIAGNOSIS — I25.10 CORONARY ARTERY DISEASE INVOLVING NATIVE HEART WITHOUT ANGINA PECTORIS, UNSPECIFIED VESSEL OR LESION TYPE: ICD-10-CM

## 2022-07-28 PROCEDURE — 99214 OFFICE O/P EST MOD 30 MIN: CPT | Mod: CS | Performed by: NURSE PRACTITIONER

## 2022-07-28 RX ORDER — METOPROLOL TARTRATE 25 MG/1
25 TABLET, FILM COATED ORAL 2 TIMES DAILY
Qty: 180 TABLET | Refills: 1 | Status: SHIPPED | OUTPATIENT
Start: 2022-07-28 | End: 2023-05-17

## 2022-07-28 NOTE — PROGRESS NOTES
Daniel is a 77 year old who is being evaluated via a billable video visit.      How would you like to obtain your AVS? MyChart  If the video visit is dropped, the invitation should be resent by: Text to cell phone: [phone]  Will anyone else be joining your video visit? No        Assessment & Plan     Patient meets criteria for COVID treatment.  Hold atorvastatin through treatment.  Reviewed potential side effects.  Otherwise stable.  Continue same prescriptions.  Due for Medicare wellness visit/physical next year.    Infection due to 2019 novel coronavirus  - nirmatrelvir and ritonavir (PAXLOVID) therapy pack; Take 3 tablets by mouth 2 times daily for 5 days    History of prostate cancer    Urinary incontinence, unspecified type    Nephrolithiasis    S/P coronary artery stent placement  - metoprolol tartrate (LOPRESSOR) 25 MG tablet; Take 1 tablet (25 mg) by mouth 2 times daily    Coronary artery disease involving native heart without angina pectoris, unspecified vessel or lesion type    Reviewed cardiology note x1, echocardiogram x1, metabolic panel x1, PSA x1, hepatic panel x1    BMI:   Estimated body mass index is 32.4 kg/m  as calculated from the following:    Height as of 3/30/22: 1.829 m (6').    Weight as of 4/14/22: 108.4 kg (238 lb 14.4 oz).       No follow-ups on file.    Goran Mckeon NP  [facility]   Daniel is a 77 year old presenting for the following health issues:  Establish Care (Tested positive for Covid this morning - home test. Temp 102 this morning. Symptoms/cough started Tuesday, Neg home Covid test Wed/yesterday, positive Covid test this morning.  Headache and body aches.  Taste changes.  Would like Covid antiviral.) and Covid      History of Present Illness       Reason for visit:  New patient orientation and medication renewal    He eats 2-3 servings of fruits and vegetables daily.He consumes 2 sweetened beverage(s) daily.He exercises with  enough effort to increase his heart rate 9 or less minutes per day.  He exercises with enough effort to increase his heart rate 3 or less days per week.   He is taking medications regularly.     Prostate CA.  S/p prostatectomy and has s/p artifical sphincter which works well for him.     Hx kidney stones.  No stones for the past several years.      CAD   MI 2017 at [facility] stented.  Checks in with caridology yearly. Echo done this year.      PVD  Varicose veins.  Some swelling.  Has actually been improving lately.  No pain.  No NYE.      Review of Systems   Constitutional, HEENT, cardiovascular, pulmonary, gi and gu systems are negative, except as otherwise noted.      Objective           Vitals:  No vitals were obtained today due to virtual visit.    Physical Exam   GENERAL: Healthy, alert and no distress  EYES: Eyes grossly normal to inspection.  No discharge or erythema, or obvious scleral/conjunctival abnormalities.  RESP: No wheeze.  No retractions or increased work of breathing.  Frequent coughing jags.  SKIN: Visible skin clear. No significant rash, abnormal pigmentation or lesions.  NEURO: Cranial nerves grossly intact.  Mentation and speech appropriate for age.  PSYCH: Mentation appears normal, affect normal/bright, judgement and insight intact, normal speech and appearance well-groomed.          Video-Visit Details    Video Start Time: 0958    Type of service:  Video Visit    Video End Time:1013    Originating Location (pt. Location): Home    Distant Location (provider location):  [facility]     Platform used for Video Visit: Aly Mckeon, RAHUL    .  ..

## 2022-08-01 ENCOUNTER — MYC MEDICAL ADVICE (OUTPATIENT)
Dept: FAMILY MEDICINE | Facility: CLINIC | Age: 77
End: 2022-08-01

## 2022-08-01 NOTE — TELEPHONE ENCOUNTER
Advised to monitor cough.if any worsening symptoms or changes to breathing, go to walk in care or schedule a visit in clinic.  Cielo Ashton RN on 8/1/2022 at 1:23 PM

## 2022-08-25 ENCOUNTER — TELEPHONE (OUTPATIENT)
Dept: FAMILY MEDICINE | Facility: CLINIC | Age: 77
End: 2022-08-25

## 2022-08-25 NOTE — TELEPHONE ENCOUNTER
Patient was scheduled for an appt on 9/1/22 with Goran Mckeon  On going cough since having covid 1 month ago. No sob or wheezing. Patients wife was told to go to walk in care if he develops a fever, sob or wheezing.  Cielo Ashton RN on 8/25/2022 at 9:55 AM

## 2022-09-24 ENCOUNTER — HEALTH MAINTENANCE LETTER (OUTPATIENT)
Age: 77
End: 2022-09-24

## 2022-09-26 ENCOUNTER — TRANSFERRED RECORDS (OUTPATIENT)
Dept: HEALTH INFORMATION MANAGEMENT | Facility: CLINIC | Age: 77
End: 2022-09-26

## 2023-03-03 ENCOUNTER — TRANSFERRED RECORDS (OUTPATIENT)
Dept: HEALTH INFORMATION MANAGEMENT | Facility: CLINIC | Age: 78
End: 2023-03-03

## 2023-03-24 ENCOUNTER — TRANSFERRED RECORDS (OUTPATIENT)
Dept: HEALTH INFORMATION MANAGEMENT | Facility: CLINIC | Age: 78
End: 2023-03-24

## 2023-05-08 ENCOUNTER — HEALTH MAINTENANCE LETTER (OUTPATIENT)
Age: 78
End: 2023-05-08

## 2023-05-12 ASSESSMENT — ENCOUNTER SYMPTOMS
SHORTNESS OF BREATH: 0
DYSURIA: 0
ABDOMINAL PAIN: 0
ARTHRALGIAS: 1
NERVOUS/ANXIOUS: 0
COUGH: 1
HEARTBURN: 0
JOINT SWELLING: 1
DIARRHEA: 0
DIZZINESS: 0
WEAKNESS: 0
CHILLS: 0
NAUSEA: 0
MYALGIAS: 0
PARESTHESIAS: 0
PALPITATIONS: 0
CONSTIPATION: 0
FEVER: 0
HEMATURIA: 0
FREQUENCY: 0
EYE PAIN: 0
HEADACHES: 0
SORE THROAT: 0
HEMATOCHEZIA: 0

## 2023-05-12 ASSESSMENT — ACTIVITIES OF DAILY LIVING (ADL): CURRENT_FUNCTION: NO ASSISTANCE NEEDED

## 2023-05-14 DIAGNOSIS — I25.10 CORONARY ARTERY DISEASE INVOLVING NATIVE HEART WITHOUT ANGINA PECTORIS, UNSPECIFIED VESSEL OR LESION TYPE: Primary | ICD-10-CM

## 2023-05-14 DIAGNOSIS — Z13.220 ENCOUNTER FOR SCREENING FOR LIPOID DISORDERS: ICD-10-CM

## 2023-05-16 RX ORDER — ATORVASTATIN CALCIUM 80 MG/1
TABLET, FILM COATED ORAL
Qty: 90 TABLET | Refills: 0 | Status: SHIPPED | OUTPATIENT
Start: 2023-05-16 | End: 2023-05-17

## 2023-05-17 ENCOUNTER — HOSPITAL ENCOUNTER (OUTPATIENT)
Dept: ULTRASOUND IMAGING | Facility: CLINIC | Age: 78
Discharge: HOME | End: 2023-05-17
Attending: NURSE PRACTITIONER | Admitting: NURSE PRACTITIONER
Payer: COMMERCIAL

## 2023-05-17 ENCOUNTER — OFFICE VISIT (OUTPATIENT)
Dept: FAMILY MEDICINE | Facility: CLINIC | Age: 78
End: 2023-05-17
Payer: COMMERCIAL

## 2023-05-17 VITALS
OXYGEN SATURATION: 98 % | BODY MASS INDEX: 31.91 KG/M2 | TEMPERATURE: 97.3 F | HEART RATE: 56 BPM | WEIGHT: 235.6 LBS | RESPIRATION RATE: 16 BRPM | DIASTOLIC BLOOD PRESSURE: 70 MMHG | HEIGHT: 72 IN | SYSTOLIC BLOOD PRESSURE: 120 MMHG

## 2023-05-17 DIAGNOSIS — M79.89 RIGHT LEG SWELLING: ICD-10-CM

## 2023-05-17 DIAGNOSIS — Z00.00 ENCOUNTER FOR MEDICARE ANNUAL WELLNESS EXAM: Primary | ICD-10-CM

## 2023-05-17 DIAGNOSIS — Z95.5 S/P CORONARY ARTERY STENT PLACEMENT: ICD-10-CM

## 2023-05-17 DIAGNOSIS — M43.6 NECK STIFFNESS: ICD-10-CM

## 2023-05-17 DIAGNOSIS — I82.811 SUPERFICIAL THROMBOSIS OF RIGHT LOWER EXTREMITY: ICD-10-CM

## 2023-05-17 DIAGNOSIS — I25.10 CORONARY ARTERY DISEASE INVOLVING NATIVE HEART WITHOUT ANGINA PECTORIS, UNSPECIFIED VESSEL OR LESION TYPE: ICD-10-CM

## 2023-05-17 DIAGNOSIS — I73.9 PERIPHERAL VASCULAR DISEASE (H): ICD-10-CM

## 2023-05-17 DIAGNOSIS — G56.03 BILATERAL CARPAL TUNNEL SYNDROME: ICD-10-CM

## 2023-05-17 DIAGNOSIS — R29.898 WEAKNESS OF BOTH UPPER EXTREMITIES: ICD-10-CM

## 2023-05-17 PROCEDURE — 80053 COMPREHEN METABOLIC PANEL: CPT | Performed by: NURSE PRACTITIONER

## 2023-05-17 PROCEDURE — G0439 PPPS, SUBSEQ VISIT: HCPCS | Performed by: NURSE PRACTITIONER

## 2023-05-17 PROCEDURE — 91312 COVID-19 BIVALENT 12+ (PFIZER): CPT | Performed by: NURSE PRACTITIONER

## 2023-05-17 PROCEDURE — 80061 LIPID PANEL: CPT | Performed by: NURSE PRACTITIONER

## 2023-05-17 PROCEDURE — 0124A COVID-19 BIVALENT 12+ (PFIZER): CPT | Performed by: NURSE PRACTITIONER

## 2023-05-17 PROCEDURE — 36415 COLL VENOUS BLD VENIPUNCTURE: CPT | Performed by: NURSE PRACTITIONER

## 2023-05-17 PROCEDURE — 99214 OFFICE O/P EST MOD 30 MIN: CPT | Mod: 25 | Performed by: NURSE PRACTITIONER

## 2023-05-17 PROCEDURE — 93971 EXTREMITY STUDY: CPT | Mod: RT

## 2023-05-17 RX ORDER — MULTIVIT WITH MINERALS/LUTEIN
1000 TABLET ORAL DAILY
COMMUNITY
Start: 2022-06-01

## 2023-05-17 RX ORDER — ATORVASTATIN CALCIUM 80 MG/1
80 TABLET, FILM COATED ORAL DAILY
Qty: 90 TABLET | Refills: 3 | Status: SHIPPED | OUTPATIENT
Start: 2023-05-17 | End: 2024-05-21

## 2023-05-17 RX ORDER — SODIUM PHOSPHATE,MONO-DIBASIC 19G-7G/118
1 ENEMA (ML) RECTAL DAILY
COMMUNITY
End: 2024-05-21

## 2023-05-17 RX ORDER — METOPROLOL TARTRATE 25 MG/1
25 TABLET, FILM COATED ORAL 2 TIMES DAILY
Qty: 180 TABLET | Refills: 3 | Status: SHIPPED | OUTPATIENT
Start: 2023-05-17 | End: 2024-05-21

## 2023-05-17 RX ORDER — NITROGLYCERIN 0.4 MG/1
0.4 TABLET SUBLINGUAL EVERY 5 MIN PRN
Qty: 25 TABLET | Refills: 0 | Status: SHIPPED | OUTPATIENT
Start: 2023-05-17

## 2023-05-17 ASSESSMENT — ENCOUNTER SYMPTOMS
EYE PAIN: 0
COUGH: 1
HEMATOCHEZIA: 0
JOINT SWELLING: 1
SHORTNESS OF BREATH: 0
FEVER: 0
PARESTHESIAS: 0
CHILLS: 0
ABDOMINAL PAIN: 0
HEARTBURN: 0
ARTHRALGIAS: 1
DIZZINESS: 0
CONSTIPATION: 0
FREQUENCY: 0
DYSURIA: 0
SORE THROAT: 0
NERVOUS/ANXIOUS: 0
HEADACHES: 0
NAUSEA: 0
MYALGIAS: 0
HEMATURIA: 0
DIARRHEA: 0
WEAKNESS: 0
PALPITATIONS: 0

## 2023-05-17 ASSESSMENT — PAIN SCALES - GENERAL: PAINLEVEL: NO PAIN (0)

## 2023-05-17 ASSESSMENT — ACTIVITIES OF DAILY LIVING (ADL): CURRENT_FUNCTION: NO ASSISTANCE NEEDED

## 2023-05-17 NOTE — PROGRESS NOTES
"SUBJECTIVE:   Daniel is a 78 year old who presents for Preventive Visit.      5/17/2023     9:36 AM   Additional Questions   Roomed by Catherine Corona CMA   Patient has been advised of split billing requirements and indicates understanding: Yes  Are you in the first 12 months of your Medicare coverage?  No    Bilateral hand numbness  Was seen by for this in the past.  Present for the past 5 years.  Had a EMG which showed left and right median neuropathy at the wrist consistent with entrapment in the carpal tunnel.  Left was deemed moderate and right was deemed mild.  No evidence was seen for cervical radiculopathy, brachial plexopathy, or ulnar neuropathy in the upper extremities. Has tried wrist braces and didn't see much improvement.  He's found that with certain neck movements, he'll find that the tingling in the hands actually improves.      Generalized arthritic pains  Located in the hips and knees.  Had had imaging through [facility] and arthritis noted.  Going up and down ladders are more bothersom.      Calf pain/swelling  Right calf.  Present for the past week.  Tender to pressure. Pain more medial on the calf.  No history of blood clots. Atraumatic.      Coronary artery disease  Status post STEMI with PCI to LAD.  History of cardiomyopathy.  On high-dose statin therapy.  Appears euvolemic today.  Overdue for follow-up with cardiology.  Is scheduled to see Dr. Rey in August.    Admits to not getting enough exercise. Tries to stay active golfing once a week.  No chest pain. Hasn't needed nitroglycerin. BP is good. Doesn't check outside the clinic.     Healthy Habits:     In general, how would you rate your overall health?  Good    Frequency of exercise:  1 day/week    Duration of exercise:  Less than 15 minutes    Do you usually eat at least 4 servings of fruit and vegetables a day, include whole grains    & fiber and avoid regularly eating high fat or \"junk\" foods?  Yes    Taking medications regularly:  Yes    " Medication side effects:  None    Ability to successfully perform activities of daily living:  No assistance needed    Home Safety:  No safety concerns identified    Hearing Impairment:  No hearing concerns    In the past 6 months, have you been bothered by leaking of urine? Yes    In general, how would you rate your overall mental or emotional health?  Good      PHQ-2 Total Score: 0    Additional concerns today:  Yes         Have you ever done Advance Care Planning? (For example, a Health Directive, POLST, or a discussion with a medical provider or your loved ones about your wishes): No, advance care planning information given to patient to review.  Advanced care planning was discussed at today's visit.       Fall risk  Fallen 2 or more times in the past year?: No  Any fall with injury in the past year?: No    Cognitive Screening   1) Repeat 3 items (Leader, Season, Table)    2) Clock draw: NORMAL  3) 3 item recall: Recalls 2 objects   Results: NORMAL clock, 1-2 items recalled: COGNITIVE IMPAIRMENT LESS LIKELY    Mini-CogTM Copyright JUAN Estevez. Licensed by the author for use in [facility]; reprinted with permission (socar@[facility]). All rights reserved.      Reviewed and updated as needed this visit by clinical staff   Tobacco  Allergies  Meds              Reviewed and updated as needed this visit by Provider                 Social History     Tobacco Use     Smoking status: Never     Smokeless tobacco: Never   Vaping Use     Vaping status: Not on file   Substance Use Topics     Alcohol use: No             5/12/2023    10:53 AM   Alcohol Use   Prescreen: >3 drinks/day or >7 drinks/week? No     Do you have a current opioid prescription? No  Do you use any other controlled substances or medications that are not prescribed by a provider? None    Current providers sharing in care for this patient include:  Patient Care Team:  Goran Mckeon NP as PCP - General  Ej Gonsalez MD as Assigned Heart and  Vascular Provider  Goran Mckeon NP as Assigned PCP  Pavel Concepcion DPM as Assigned Surgical Provider  Franck Rey MD as MD (Cardiovascular Disease)    The following health maintenance items are reviewed in Epic and correct as of today:  Health Maintenance   Topic Date Due     ANNUAL REVIEW OF HM ORDERS  Never done     ZOSTER IMMUNIZATION (1 of 2) Never done     MEDICARE ANNUAL WELLNESS VISIT  01/14/2023     COVID-19 Vaccine (5 - Moderna series) 09/17/2023     FALL RISK ASSESSMENT  05/17/2024     LIPID  05/27/2027     ADVANCE CARE PLANNING  05/17/2028     COLORECTAL CANCER SCREENING  09/07/2028     DTAP/TDAP/TD IMMUNIZATION (3 - Td or Tdap) 03/25/2029     HEPATITIS C SCREENING  Completed     PHQ-2 (once per calendar year)  Completed     INFLUENZA VACCINE  Completed     Pneumococcal Vaccine: 65+ Years  Completed     IPV IMMUNIZATION  Aged Out     MENINGITIS IMMUNIZATION  Aged Out     Lab work is in process    Review of Systems   Constitutional: Negative for chills and fever.   HENT: Negative for congestion, ear pain, hearing loss and sore throat.    Eyes: Negative for pain and visual disturbance.   Respiratory: Positive for cough. Negative for shortness of breath.    Cardiovascular: Positive for peripheral edema. Negative for chest pain and palpitations.   Gastrointestinal: Negative for abdominal pain, constipation, diarrhea, heartburn, hematochezia and nausea.   Genitourinary: Positive for impotence. Negative for dysuria, frequency, genital sores, hematuria, penile discharge and urgency.   Musculoskeletal: Positive for arthralgias and joint swelling. Negative for myalgias.   Skin: Negative for rash.   Neurological: Negative for dizziness, weakness, headaches and paresthesias.   Psychiatric/Behavioral: Negative for mood changes. The patient is not nervous/anxious.      OBJECTIVE:   /70 (BP Location: Left arm, Patient Position: Sitting, Cuff Size: Adult Large)   Pulse 56   Temp 97.3  F (36.3  C)  "(Oral)   Resp 16   Ht 1.816 m (5' 11.5\")   Wt 106.9 kg (235 lb 9.6 oz)   SpO2 98%   BMI 32.40 kg/m   Estimated body mass index is 32.4 kg/m  as calculated from the following:    Height as of this encounter: 1.816 m (5' 11.5\").    Weight as of this encounter: 106.9 kg (235 lb 9.6 oz).  Physical Exam  GENERAL: healthy, alert and no distress  EYES: Eyes grossly normal to inspection, PERRL and conjunctivae and sclerae normal  HENT: ear canals and TM's normal, nose and mouth without ulcers or lesions  NECK: no adenopathy, no asymmetry, masses, or scars and thyroid normal to palpation  RESP: lungs clear to auscultation - no rales, rhonchi or wheezes  CV: regular rate and rhythm, normal S1 S2, no S3 or S4, no murmur, click or rub, no peripheral edema and peripheral pulses strong  ABDOMEN: soft, nontender, no hepatosplenomegaly, no masses and bowel sounds normal  MS: no gross musculoskeletal defects noted, no edema  SKIN: no suspicious lesions or rashes  NEURO: Normal strength and tone, mentation intact and speech normal  PSYCH: mentation appears normal, affect normal/bright    Right calf 42cm. Left 39cm.    Diagnostic Test Results:  Labs reviewed in Epic    ASSESSMENT / PLAN:       ICD-10-CM    1. Encounter for Medicare annual wellness exam  Z00.00       2. S/P coronary artery stent placement  Z95.5 metoprolol tartrate (LOPRESSOR) 25 MG tablet      3. Bilateral carpal tunnel syndrome  G56.03 Orthopedic  Referral      4. Neck stiffness  M43.6 MR Cervical Spine w/o Contrast      5. Weakness of both upper extremities  R29.898 MR Cervical Spine w/o Contrast      6. Coronary artery disease involving native heart without angina pectoris, unspecified vessel or lesion type  I25.10 nitroGLYcerin (NITROSTAT) 0.4 MG sublingual tablet     Lipid panel     Comprehensive metabolic panel (BMP + Alb, Alk Phos, ALT, AST, Total. Bili, TP)     Lipid panel     Comprehensive metabolic panel (BMP + Alb, Alk Phos, ALT, AST, Total. " Amor, KRISTYN)      7. Peripheral vascular disease (H)  I73.9       8. Right leg swelling  M79.89 US Lower Extremity Venous Duplex Right        COVID booster given.  Follow-up with cardiology later this year.  Patient is having some neck issues and does get some relief from the weakness/numbness with neck position changes.  Still very likely could be carpal tunnel but given these changes and worsening symptoms, MRI of the cervical spine ordered.  If persistent neurological deficit, let me know.  Follow-up with orthopedics based on results.  Wells score for DVT-2.  Right lower extremity ultrasound ordered to get done today.  No evidence of worsening peripheral vascular disease.  Discussed different ways to manage arthritic pains.  He is going to use topicals like Biofreeze and CBD creams and if worsening or needing assistance, let me know.  In regards to his history of prostate cancer, has had nondetectable PSA since 2007.  He is 78 years old.  After discussion, he elected to discontinue.    Late addendum: Ultrasound reveals superficial thrombus along greater saphenous vein.  Symptomatic.  Discussed different treatment options with the patient.  Elects to start Xarelto.  Risks and benefits reviewed in detail.  Patient has been advised of split billing requirements and indicates understanding: Yes    Reviewed cardiology note x1, carotid ultrasound x1, EMG x1, vascular surgery note x1  COUNSELING:  Reviewed preventive health counseling, as reflected in patient instructions        He reports that he has never smoked. He has never used smokeless tobacco.      Appropriate preventive services were discussed with this patient, including applicable screening as appropriate for cardiovascular disease, diabetes, osteopenia/osteoporosis, and glaucoma.  As appropriate for age/gender, discussed screening for colorectal cancer, prostate cancer, breast cancer, and cervical cancer. Checklist reviewing preventive services available has  been given to the patient.    Reviewed patients plan of care and provided an AVS. The Basic Care Plan (routine screening as documented in Health Maintenance) for Daniel meets the Care Plan requirement. This Care Plan has been established and reviewed with the Patient.      Goran Mckeon NP  [facility]    Identified Health Risks:    I have reviewed Opioid Use Disorder and Substance Use Disorder risk factors and made any needed referrals.       He is at risk for lack of exercise and has been provided with information to increase physical activity for the benefit of his well-being.  Information on urinary incontinence and treatment options given to patient.

## 2023-05-17 NOTE — PATIENT INSTRUCTIONS
Topical voltaren or biofreeze are other options for the joint pain.  If wanting to try that celebrex medicine we talked about, let me know.    Updating labs today.    MRI of the cervical spine ordered and referral for the hand numbness placed to [facility].     Ultrasound of the right lower leg ordered.  Go ahead and call to get this scheduled and the likely will be able to do this today.    COVID booster given today.        Patient Education   Personalized Prevention Plan  You are due for the preventive services outlined below.  Your care team is available to assist you in scheduling these services.  If you have already completed any of these items, please share that information with your care team to update in your medical record.  Health Maintenance Due   Topic Date Due    ANNUAL REVIEW OF HM ORDERS  Never done    Zoster (Shingles) Vaccine (1 of 2) Never done    COVID-19 Vaccine (4 - Moderna series) 01/10/2022    Annual Wellness Visit  01/14/2023       Exercise for a Healthier Heart  You may wonder how you can improve the health of your heart. If you re thinking about exercise, you re on the right track. You don t need to become an athlete. But you do need a certain amount of brisk exercise to help strengthen your heart. If you have been diagnosed with a heart condition, your healthcare provider may advise exercise to help your condition. To help make exercise a habit, choose safe, fun activities.      Exercise with a friend. When activity is fun, you're more likely to stick with it.     Before you start  Check with your healthcare provider before starting an exercise program. This is especially important if you haven't been active for a while. It's also important if you have a long-term (chronic) health problem such as heart disease, diabetes, or obesity. Also check with your provider if you're at high risk for having these problems.   Why exercise?  Exercising regularly offers many healthy rewards. It can help you  do all of these:   Improve your blood cholesterol level to help prevent further heart trouble.  Lower your blood pressure to help prevent a stroke or heart attack.  Control diabetes or reduce your risk of getting this disease.  Improve your heart and lung function.  Reach and stay at a healthy weight.  Make your muscles stronger so you can stay active.  Prevent falls and fractures by slowing the loss of bone mass (osteoporosis).  Manage stress better.  Improve your sense of self and your body image.  Exercise tips    Ease into your routine. Set small goals. Then build on them. Talk with your healthcare provider first before starting an exercise routine if you're not sure what your activity level should be.  Exercise on most days. Aim for a total of at least 150 minutes (2 hours and 30 minutes) or more of moderate-intensity aerobic activity each week. You could also do 75 minutes (1 hour and 15 minutes) or more of vigorous-intensity aerobic activity each week. Or try for a combination of both. Moderate activity means that you breathe heavier and your heart rate increases, but you can still talk. Think about doing at least 30 minutes of moderate exercise, 5 times a week. It's OK to work up to the 30-minute period over time. Examples of moderate-intensity activity are brisk walking, gardening, and water aerobics.  Step up your daily activity level.  Along with your exercise program, try being more active the whole day. Walk instead of drive. Or park further away so that you take more steps each day. Do more household tasks or yard work. You may not be able to meet the advised amount of physical activity. But doing some moderate- or vigorous-intensity aerobic activity can help reduce your risk for heart disease. Your healthcare provider can help you figure out what is best for you.  Choose 1 or more activities you enjoy.  Walking is one of the easiest things you can do. You can also try swimming, riding a bike, dancing,  or taking an exercise class.    Call 911  Call 911 right away if any of these occur:   Chest pain that doesn't go away quickly with rest  New burning, tightness, pressure, or heaviness in your chest, neck, shoulders, back, or arms  Abnormal or severe shortness of breath  A very fast or irregular heartbeat (palpitations)  Fainting  When to call your healthcare provider  Call your healthcare provider if you have any of these:   Dizziness or lightheadedness  Mild shortness of breath or chest pain  Increased or new joint or muscle pain    Ilan last reviewed this educational content on 7/1/2022 2000-2022 [employer]. All rights reserved. This information is not intended as a substitute for professional medical care. Always follow your healthcare professional's instructions.          Urinary Incontinence (Male)  We understand that gender is a spectrum. We may use gendered terms to talk about anatomy and health risk. Please use this sheet in a way that works best for you and your provider as you talk about your care.     Urinary incontinence means not being able to control the release of urine from the bladder.   Causes  Common causes of urinary incontinence in men include:  Infection  Certain medicines  Aging  Poor pelvic muscle tone  Bladder spasms  Obesity  Enlarged prostate  Trouble urinating and fully emptying the bladder (urinary retention)  Other things that can cause incontinence are:   Nervous system diseases  Diabetes  Sleep apnea  Urinary tract infections  Prostate surgery  Pelvic injury  Constipation and smoking have also been identified as risk factors.   Symptoms  Urge incontinence (overactive bladder). This is a sudden urge to urinate. It occurs even though there may not be much urine in the bladder. The need to urinate often during the night is common. It's due to overactive bladder muscles.  Stress incontinence. This is urine leakage that you can't control. It can occur with sneezing,  coughing, and other actions that put stress on the bladder.    Treatment  Treatment depends on what is causing the condition. Bladder infections are treated with antibiotics. Urinary retention is treated with a bladder catheter.   Home care  Follow these guidelines when caring for yourself at home:  Don't have any foods and drinks that may irritate the bladder. This includes:  Chocolate  Alcohol  Caffeine  Carbonated drinks  Acidic fruits and juices  Limit fluids to 6 to 8 cups a day.  Lose weight if you are overweight. This may reduce your symptoms.  If advised, do regular pelvic muscle-strengthening exercises such as Kegel exercises.  If needed, wear absorbent pads to catch urine. Change the pads often. This is for good hygiene and to prevent skin and bladder infections.  Bathe daily for good hygiene.  If an antibiotic was prescribed to treat a bladder infection, take it until it's finished. Keep taking it even if you are feeling better. This is to make sure your infection has cleared.  If a catheter was left in place, keep bacteria from getting into the collection bag. Don't disconnect the catheter from the collection bag.  Use a leg band to secure the catheter drainage tube, so it does not pull on the catheter. Drain the collection bag when it becomes full. To do this, use the drain spout at the bottom of the bag. Don't disconnect the bag from the catheter.  Don't pull on or try to remove a catheter. The catheter must be removed by a healthcare provider.  If you smoke, stop. Ask your provider for help if you can't do this on your own.  Follow-up care  Follow up with your healthcare provider, or as advised.  When to get medical advice  Call your healthcare provider right away if any of these occur:   Fever over 100.4 F (38 C), or as directed by your provider  Bladder pain or fullness  Belly swelling, nausea, or vomiting  Back pain  Weakness, dizziness, or fainting  If a catheter was left in place, return if:  The  catheter falls out  The catheter stops draining for 6 hours  Your urine gets cloudy or smells bad  Ilan last reviewed this educational content on 6/1/2022 2000-2022 [employer]. All rights reserved. This information is not intended as a substitute for professional medical care. Always follow your healthcare professional's instructions.

## 2023-05-18 LAB
ALBUMIN SERPL BCG-MCNC: 4.1 G/DL (ref 3.5–5.2)
ALP SERPL-CCNC: 95 U/L (ref 40–129)
ALT SERPL W P-5'-P-CCNC: 27 U/L (ref 10–50)
ANION GAP SERPL CALCULATED.3IONS-SCNC: 12 MMOL/L (ref 7–15)
AST SERPL W P-5'-P-CCNC: 33 U/L (ref 10–50)
BILIRUB SERPL-MCNC: 0.6 MG/DL
BUN SERPL-MCNC: 16.2 MG/DL (ref 8–23)
CALCIUM SERPL-MCNC: 8.9 MG/DL (ref 8.8–10.2)
CHLORIDE SERPL-SCNC: 107 MMOL/L (ref 98–107)
CHOLEST SERPL-MCNC: 96 MG/DL
CREAT SERPL-MCNC: 0.91 MG/DL (ref 0.67–1.17)
DEPRECATED HCO3 PLAS-SCNC: 22 MMOL/L (ref 22–29)
GFR SERPL CREATININE-BSD FRML MDRD: 87 ML/MIN/1.73M2
GLUCOSE SERPL-MCNC: 101 MG/DL (ref 70–99)
HDLC SERPL-MCNC: 39 MG/DL
LDLC SERPL CALC-MCNC: 49 MG/DL
NONHDLC SERPL-MCNC: 57 MG/DL
POTASSIUM SERPL-SCNC: 4.4 MMOL/L (ref 3.4–5.3)
PROT SERPL-MCNC: 7 G/DL (ref 6.4–8.3)
SODIUM SERPL-SCNC: 141 MMOL/L (ref 136–145)
TRIGL SERPL-MCNC: 38 MG/DL

## 2023-05-26 ENCOUNTER — HOSPITAL ENCOUNTER (OUTPATIENT)
Dept: MRI IMAGING | Facility: CLINIC | Age: 78
Discharge: HOME | End: 2023-05-26
Attending: NURSE PRACTITIONER | Admitting: NURSE PRACTITIONER
Payer: COMMERCIAL

## 2023-05-26 DIAGNOSIS — R29.898 WEAKNESS OF BOTH UPPER EXTREMITIES: ICD-10-CM

## 2023-05-26 DIAGNOSIS — M43.6 NECK STIFFNESS: ICD-10-CM

## 2023-05-26 PROCEDURE — 72141 MRI NECK SPINE W/O DYE: CPT

## 2023-06-02 DIAGNOSIS — I82.811 SUPERFICIAL THROMBOSIS OF RIGHT LOWER EXTREMITY: ICD-10-CM

## 2023-06-02 NOTE — TELEPHONE ENCOUNTER
Refill Request  Medication name: Pending Prescriptions:                       Disp   Refills    rivaroxaban ANTICOAGULANT (XARELTO) 20 MG*60 tab*0            Sig: Take 1 tablet (20 mg) by mouth daily (with           dinner)      Also need starter pack if that's what he needs to do.      Requested Pharmacy:  [id number] [address]     *pt needs this sent to this pharmacy for a discounted price*    Please call pt with questions and to confirm it was sent.

## 2023-06-02 NOTE — TELEPHONE ENCOUNTER
Prescription sent.  Note says that he would like confirmation when this was sent.  You can let him know.    Goran Mckeon, CNP

## 2023-06-08 ENCOUNTER — TRANSFERRED RECORDS (OUTPATIENT)
Dept: HEALTH INFORMATION MANAGEMENT | Facility: CLINIC | Age: 78
End: 2023-06-08
Payer: COMMERCIAL

## 2023-06-20 ENCOUNTER — OFFICE VISIT (OUTPATIENT)
Dept: FAMILY MEDICINE | Facility: CLINIC | Age: 78
End: 2023-06-20
Payer: COMMERCIAL

## 2023-06-20 VITALS
WEIGHT: 235 LBS | SYSTOLIC BLOOD PRESSURE: 129 MMHG | OXYGEN SATURATION: 97 % | HEART RATE: 74 BPM | BODY MASS INDEX: 32.32 KG/M2 | RESPIRATION RATE: 16 BRPM | TEMPERATURE: 98.1 F | DIASTOLIC BLOOD PRESSURE: 76 MMHG

## 2023-06-20 DIAGNOSIS — R09.82 PND (POST-NASAL DRIP): ICD-10-CM

## 2023-06-20 DIAGNOSIS — R05.1 ACUTE COUGH: Primary | ICD-10-CM

## 2023-06-20 DIAGNOSIS — J01.90 ACUTE SINUSITIS, RECURRENCE NOT SPECIFIED, UNSPECIFIED LOCATION: ICD-10-CM

## 2023-06-20 PROCEDURE — 99213 OFFICE O/P EST LOW 20 MIN: CPT | Performed by: PHYSICIAN ASSISTANT

## 2023-06-20 RX ORDER — BENZONATATE 100 MG/1
100 CAPSULE ORAL 3 TIMES DAILY PRN
Qty: 30 CAPSULE | Refills: 0 | Status: SHIPPED | OUTPATIENT
Start: 2023-06-20 | End: 2023-06-30

## 2023-06-20 RX ORDER — FLUTICASONE PROPIONATE 50 MCG
1 SPRAY, SUSPENSION (ML) NASAL 2 TIMES DAILY
Qty: 16 G | Refills: 0 | Status: SHIPPED | OUTPATIENT
Start: 2023-06-20 | End: 2023-07-18

## 2023-06-20 ASSESSMENT — ENCOUNTER SYMPTOMS
RHINORRHEA: 1
SHORTNESS OF BREATH: 0
SINUS PRESSURE: 0
COUGH: 1
WHEEZING: 0
FEVER: 0
CHILLS: 0
CHEST TIGHTNESS: 0
SINUS PAIN: 0

## 2023-06-20 NOTE — PROGRESS NOTES
Assessment & Plan     1. Acute cough    -Patient's cough most likely due to postnasal drip.  Prescribed Tessalon Perles to use as needed for cough.  There is a history of benzocaine allergy in patient's chart.  Patient does not recall having a history of benzocaine.  He has used lidocaine in the past whenever he visited the dentist office with no adverse reaction.  - benzonatate (TESSALON) 100 MG capsule; Take 1 capsule (100 mg) by mouth 3 times daily as needed for cough  Dispense: 30 capsule; Refill: 0    2. PND (post-nasal drip)    - fluticasone (FLONASE) 50 MCG/ACT nasal spray; Spray 1 spray into both nostrils 2 times daily for 28 days  Dispense: 16 g; Refill: 0    3. Acute sinusitis, recurrence not specified, unspecified location    -Viral etiology  -Patient advised to start using Flonase to help with the sinus congestion and also postnasal drip.       Patient Instructions   Cough is most likely due to Post nasal drip  You can take cough medication Tessalon perles to help with cough  Start flonase to help with the Post nasal drip and sinus congestion      Return if symptoms worsen or fail to improve, for Follow up.    At the end of the encounter, I discussed results, diagnosis, medications. Discussed red flags for immediate return to clinic/ER, as well as indications for follow up if no improvement. Patient understood and agreed to plan. Patient was stable for discharge.    Aroldo Sanchez is a 78 year old male who presents to clinic today for the following health issues:  Chief Complaint   Patient presents with     Cough     Has had cough nasal congestion  for 4 days no fever     HPI    Patient reports congestion, post nasal drip and cough x 4 days. He notes cough is productive, worse at night, interfering with sleep. Patient reports taking cough drops which do not help. No history of Asthma or smoking or allergies. He denies fever, shortness of breath, wheezing.     Review of Systems   Constitutional:  Negative for chills and fever.   HENT: Positive for congestion, postnasal drip and rhinorrhea. Negative for sinus pressure and sinus pain.    Respiratory: Positive for cough. Negative for chest tightness, shortness of breath and wheezing.        Problem List:  2023-05: Superficial thrombosis of right lower extremity  2022-07: CAD (coronary artery disease)  2020-12: Peripheral vascular disease (H)  History of prostate cancer  Nephrolithiasis  Lower Back Pain  Urinary Incontinence  Prostate Cancer      Past Medical History:   Diagnosis Date     Cardiac abnormality      Low back pain      Prostate cancer (H) 2007       Social History     Tobacco Use     Smoking status: Never     Smokeless tobacco: Never   Vaping Use     Vaping status: Not on file   Substance Use Topics     Alcohol use: No           Objective    /76   Pulse 74   Temp 98.1  F (36.7  C) (Oral)   Resp 16   Wt 106.6 kg (235 lb)   SpO2 97%   BMI 32.32 kg/m    Physical Exam  Constitutional:       Appearance: Normal appearance.   HENT:      Head: Normocephalic.      Nose: Congestion present.      Right Sinus: No maxillary sinus tenderness or frontal sinus tenderness.      Left Sinus: No maxillary sinus tenderness or frontal sinus tenderness.      Mouth/Throat:      Mouth: Mucous membranes are moist.      Pharynx: Oropharynx is clear. Uvula midline. No posterior oropharyngeal erythema.   Cardiovascular:      Rate and Rhythm: Normal rate and regular rhythm.   Pulmonary:      Effort: Pulmonary effort is normal.      Breath sounds: Normal breath sounds.   Lymphadenopathy:      Head:      Right side of head: No submental, submandibular or tonsillar adenopathy.      Left side of head: No submental, submandibular or tonsillar adenopathy.      Cervical: No cervical adenopathy.      Right cervical: No superficial cervical adenopathy.     Left cervical: No superficial cervical adenopathy.   Skin:     General: Skin is warm and dry.      Findings: No rash.    Neurological:      Mental Status: He is alert.   Psychiatric:         Mood and Affect: Mood normal.         Behavior: Behavior normal.              Marissa Baca PA-C

## 2023-06-20 NOTE — PATIENT INSTRUCTIONS
Cough is most likely due to Post nasal drip  You can take cough medication Tessalon perles to help with cough  Start flonase to help with the Post nasal drip and sinus congestion

## 2023-07-17 ENCOUNTER — TELEPHONE (OUTPATIENT)
Dept: FAMILY MEDICINE | Facility: CLINIC | Age: 78
End: 2023-07-17
Payer: COMMERCIAL

## 2023-07-17 DIAGNOSIS — I82.811 SUPERFICIAL THROMBOSIS OF RIGHT LOWER EXTREMITY: ICD-10-CM

## 2023-07-17 RX ORDER — RIVAROXABAN 20 MG/1
TABLET, FILM COATED ORAL
Qty: 60 TABLET | Refills: 0 | OUTPATIENT
Start: 2023-07-17

## 2023-07-17 NOTE — TELEPHONE ENCOUNTER
Routing refill request to provider for review/approval because:  Medication is reported/historical-patient reported he is not taking this med  Labs not current: Plt, Creat Clr    Last Written Prescription Date:  6/2/2023  Last Fill Quantity: 60,  # refills: 0   Last office visit provider:  6/20/2023     Requested Prescriptions   Pending Prescriptions Disp Refills    XARELTO ANTICOAGULANT 20 MG TABS tablet [Pharmacy Med Name: XARELTO 20 MG TABLET] 60 tablet 0     Sig: TAKE 1 TABLET BY MOUTH EVERY DAY WITH DINNER       Direct Oral Anticoagulant Agents Failed - 7/17/2023 11:02 AM        Failed - Normal Platelets on file in past 12 months     No lab results found.            Failed - Creatinine Clearance greater than 50 ml/min on file in past 3 mos     No lab results found.          Passed - Medication is active on med list        Passed - Serum creatinine less than or equal to 1.4 on file in past 3 mos     Recent Labs   Lab Test 05/17/23  1038   CR 0.91       Ok to refill medication if creatinine is low          Passed - Patient is 18 years of age or older        Passed - Recent (6 mo) or future (30 days) visit within the authorizing provider's specialty             ZACH JONES RN 07/17/23 11:06 AM

## 2023-07-17 NOTE — TELEPHONE ENCOUNTER
Rx was temporary. Please check in on Daniel. Has his leg swelling and discomfort gone down?    Goran Mckeon, CNP

## 2023-07-17 NOTE — TELEPHONE ENCOUNTER
Left message with patient to return call to clinic.  Per Goran Mckeon NP inquiring if leg swelling and discomfort is improved.  Xarelto rx was temporary.    JOELLE ArnoldN RN  [facility]

## 2023-07-17 NOTE — TELEPHONE ENCOUNTER
Edilia Jacome RN called Daniel on 7/17 and left a message to return call to clinic. If patient calls back, please route to any available RN to triage.    Message from Goran regarding Xarelto rx.  See below.    MATTIE Arnold RN  [facility]

## 2023-07-18 NOTE — TELEPHONE ENCOUNTER
Spoke with patient and he states that he did not start the medication until about 3 weeks ago due to getting a steroid injection in his neck and then got sick.  He does feel like the swelling is improving and does plan to just take the 60 days and then stopping the medication.    Advice given on precautions of the medication and to call clinic if has any questions or concerns. Patient verbalized understanding.       MATTIE Arnold RN  [facility]

## 2023-07-19 NOTE — TELEPHONE ENCOUNTER
Closing this refill encounter as additional information was obtained via 7/17 telephone encounter. For additional information, please refer to telephone encounter from 7/17/2023.    Serene PHELPS, RN, PHN  [facility]

## 2023-07-21 ENCOUNTER — TRANSFERRED RECORDS (OUTPATIENT)
Dept: HEALTH INFORMATION MANAGEMENT | Facility: CLINIC | Age: 78
End: 2023-07-21
Payer: COMMERCIAL

## 2023-08-10 ENCOUNTER — OFFICE VISIT (OUTPATIENT)
Dept: CARDIOLOGY | Facility: CLINIC | Age: 78
End: 2023-08-10
Attending: INTERNAL MEDICINE
Payer: COMMERCIAL

## 2023-08-10 VITALS
RESPIRATION RATE: 16 BRPM | HEART RATE: 64 BPM | HEIGHT: 72 IN | WEIGHT: 237.1 LBS | SYSTOLIC BLOOD PRESSURE: 104 MMHG | DIASTOLIC BLOOD PRESSURE: 78 MMHG | BODY MASS INDEX: 32.12 KG/M2

## 2023-08-10 DIAGNOSIS — I25.10 CORONARY ARTERY DISEASE INVOLVING NATIVE HEART WITHOUT ANGINA PECTORIS, UNSPECIFIED VESSEL OR LESION TYPE: ICD-10-CM

## 2023-08-10 DIAGNOSIS — Z13.220 ENCOUNTER FOR SCREENING FOR LIPOID DISORDERS: ICD-10-CM

## 2023-08-10 DIAGNOSIS — Z95.5 S/P CORONARY ARTERY STENT PLACEMENT: ICD-10-CM

## 2023-08-10 PROCEDURE — 99214 OFFICE O/P EST MOD 30 MIN: CPT | Performed by: INTERNAL MEDICINE

## 2023-08-10 RX ORDER — KETOCONAZOLE 20 MG/G
CREAM TOPICAL PRN
COMMUNITY
Start: 2023-07-09

## 2023-08-10 NOTE — PATIENT INSTRUCTIONS
We are going to plan an exercise stress echocardiogram and I will be in touch with results.Please call or write with any heart related questions.My nurse is Oliva and her number is [phone]

## 2023-08-10 NOTE — LETTER
8/10/2023    Goran Mckeon, NP  [address] [place] [address]    RE: Daniel Jacobs       Dear Colleague,     I had the [facility].    HEART CARE ENCOUNTER CONSULTATON NOTE      [facility]  [phone]      Assessment/Recommendations   Assessment/Plan:  1.  Coronary artery disease.  New patient to me today.  History of myocardial infarction in 2017 with intervention to the left anterior descending and diagonal branch as outlined below.  Reduced ejection fraction previously described with most recent echocardiogram from March 2022 reporting ejection fraction of 55% without identifiable motion abnormality.  He does endorse some dyspnea on exertion with plans to pursue exercise stress echocardiogram to further evaluate.    2.  Dyslipidemia.  Atorvastatin dose had been increased to 80 mg daily when he saw my colleague 1 year ago.  Cholesterol numbers from May 2023 finds a total cholesterol of 96 with an LDL of 49.  He is tolerating this dose of atorvastatin and will continue with the current dosing schedule.    Plan 1.  Exercise stress echocardiogram  2.  Further recommendations pending the results of the exercise stress test with recommendations to continue with current combination of medications and monitor for symptoms.  3.  Encourage daily exercise  4.  Follow-up in 6 months             History of Present Illness/Subjective    HPI: Daniel Jacobs is a 78 year old male new patient to me today.  He was seen March 2022 by my colleague .  He has a history of coronary artery disease per chart review and had a myocardial infarction in 2017 with total occlusion of the mid LAD with a status post thrombectomy and drug-eluting stent x 2 and PTCA of a first diagonal branch at [facility].  He had reduced ejection fraction estimated at 46% with mid to distal anterior, anteroseptal and apical wall motion  abnormality.  Most recent echocardiogram was completed 2022 at which time ejection fraction was said to be 55%.  Trace aortic insufficiency without regional wall motion abnormality.  Patient has been describing some dyspnea on exertion when he saw my colleague 1 year ago.    He reports that his symptoms at the time of his myocardial infarction was chest pressure he awakened early that morning with symptoms and presented to the ER with the angiographic findings as outlined.  He has not had similar symptoms but does endorse some dyspnea when climbing a hill.  He has no chest discomfort symptoms.  He has no orthopnea or PND.      Review of systems is pertinent for some discomfort in his right lower extremity which prompted ultrasound and evidence for superficial thrombus involving the right greater saphenous vein.  His primary care provider placed him on Xarelto for 6 months and off aspirin with reassessment planned.  In addition he reports some intermittent numbness in his arms and indicates that this is improved after cervical neck injection.  He has some additional EMG studies pending.      Recent Echocardiogram Results:  Reading Physician Reading Date Result Priority   Jennifer Freitas MD  [phone] 2022      Narrative & Impression  [id number]  [id number]  [id number]  [id number]^TROY^[place] [address]     Name: AMITA MARTÍENZ  MRN: [id number]  : 1945  Study Date: 2022 08:54 AM  Age: 76 yrs  Gender: Male  Patient Location: [facility]  Reason For Study: S/P coronary artery stent placement, Cardiomyopathy,  unspecified  Ordering Physician: KAT SIMMONS  Referring Physician: KAT SIMMONS  Performed By: ACE     BSA: 2.3 m2  Height: 72 in  Weight: 240 lb  HR: 63  BP: 116/68 mmHg  ______________________________________________________________________________  Procedure  Complete Echo  Adult.  ______________________________________________________________________________  Interpretation Summary     1. Normal left ventricular size and systolic performance with a visually  estimated ejection fraction of 55%.  2. There is trace aortic insufficiency.  3. Normal right ventricular size and systolic performance.  ______________________________________________________________________________  Left ventricle:  Normal left ventricular size and systolic performance with a visually  estimated ejection fraction of 55%. There is normal regional wall motion. Left  ventricular wall thickness is normal.     Assessment of LV Diastolic Function: The cumulative findings suggest normal  diastolic filling [The septal e' velocity is < 7 cm/s & lateral e' velocity  is  < 10 cm/s. The average E/e' is < 14. The TR velocity cannot be determined due  to insufficient tricuspid insufficiency signal. Left atrial volume index is  less than 34 mL/mÂ ].     Right ventricle:  Normal right ventricular size and systolic performance.     Left atrium:  The left atrium is of normal size.     Right atrium:  The right atrium is of normal size.     IVC:  The IVC is of normal caliber.     Aortic valve:  The aortic valve is not well visualized, but suspected to be comprised of  three cusps. There is no significant aortic stenosis. There is trace aortic  insufficiency.     Mitral valve:  The mitral valve appears morphologically normal. There is trace mitral  insufficiency.     Tricuspid valve:  The tricuspid valve is grossly morphologically normal. There is trace  tricuspid insufficiency.     Pulmonic valve:  The aortic valve is comprised of three cusps.     Thoracic aorta:  The aortic root and proximal ascending aorta are of normal dimension.     Pericardium:  There is no significant pericardial effusion.      Narrative & Impression       BILATERAL CAROTID ULTRASOUND     Indication: Bilateral hand numbness     Endarterectomy: NA      Previous: None     Symptoms: none     TECHNIQUE: Duplex exam performed utilizing 2D gray-scale imaging, Doppler interrogation with color-flow and spectral waveform analysis.     Right Velocity  Chart (cm/sec)  Location Right   CCA-   CCA- ED 21   ICA-   ICA-ED 34   ECA-   ECA-ED 13   Vetebral-Ante 48   Subclavian A-Tri/Bi 139   Ratio ICA/CCA-PS 1.12   Ratio ICA/CCA-ED 1.61      Left Velocity  Chart (cm/sec)  Location Left   CCA-PS 92   CCA- ED 24   ICA-PSA 99   ICA-ED 28   ECA-PS 89   ECA-ED 13   Vetebral-Ante 61   Subclavian A-Tri/Bi 175   Ratio ICA/CCA-PS 1.08   Ratio ICA/CCA-ED 1.17      Comment:       FINDINGS:     RIGHT: There isminimal  atheromatous plaque.     LEFT: There is minimal atheromatous plaque.     Both vertebral arteries and subclavian artery waveforms are antegrade and normal.     Impression:       1. Normal  diameter stenosis of the right ICA relative to the distal ICA diameter.      2. Normal diameter stenosis of the left ICA relative to the distal ICA diameter.      Evaluation based on velocities and NASCET criteria          Recent Coronary Angiogram Results:    Briseyda Guardado MD - 02/03/2017 10:20 AM CST   Formatting of this note is different from the original.       [facility]   Cardiac Catheterization Report     Name:  AMITA MARTÍNEZ Event Date: 2/3/2017 09:15   Anthony[place] ID #: [id number]    Encounter #: [id number]   Accession #: [id number]   PAO #: [id number] Diagnostic Physician: BRISEYDA GUARDADO   [facility]   Interventional Physician: BRISEYDA GUARDADO   [facility]   Referring Physician: YOB: 1945   Gender: Male   Age: 71     Summary/Conclusions   PRESENTATION / INDICATIONS   ? STEMI   DIAGNOSTIC - CORONARY   ? Right dominant coronary artery system   ? The left main artery has minimal disease.   ? Acute total occlusion of the mid LAD   ? 99% stenosis in the ostial 1st Diagonal   ? The  circumflex artery has minimal disease.   ? The RCA has minimal disease.   INTERVENTION   ? Successful angioplasty and stenting (drug eluting) of the LAD   ? Successful angioplasty of the proximal 1st diagonal   ? A thrombectomy catheter was used for thrombus extraction during the intervention   ? JOY 0 flow pre and OJY 3 flow post.   HEMODYNAMICS   ? The LVEDP is within normal limits   LEFT VENTRICULAR FUNCTION   ? Left ventricular function is normal with EF of 55%   ? Severe hypokinesis noted in the apical region. Cannot rule out thrombus.   RECOMMENDATIONS & PLAN   ? Medical Rx - Aspirin: 81 mg daily   ? Brilinta 90mg bid   ? Optimize risk factors and medications   ? Optimize treatment for reduced LV systolic function   ? Consider cMRI to rule out thrombus in apex.        Physical Examination  Review of Systems   Vitals: 104/78, weight 237 pounds, heart rate of 64 and regular  Wt Readings from Last 3 Encounters:   06/20/23 106.6 kg (235 lb)   05/17/23 106.9 kg (235 lb 9.6 oz)   04/14/22 108.4 kg (238 lb 14.4 oz)       General Appearance:   no distress, normal body habitus   ENT/Mouth: membranes moist,    EYES:  no scleral icterus, normal conjunctivae   Neck: no carotid bruits or thyromegaly   Chest/Lungs:   lungs are clear to auscultation, no rales or wheezing, equal chest wall expansion    Cardiovascular:   Regular. Normal first and second heart sounds with no murmurs, rubs, or gallops; the carotid, radial and posterior tibial pulses are intact, Jugular venous pressure within normal limits, no significant edema bilaterally    Abdomen:  no  bruits, or tenderness; bowel sounds are present   Extremities: no cyanosis or clubbing   Skin: no xanthelasma, warm.    Neurologic:  no tremors     Psychiatric: alert and oriented x3, calm        Please refer above for cardiac ROS details.        Medical History  Surgical History Family History Social History   Past Medical History:   Diagnosis Date    Cardiac abnormality      Low back pain     Prostate cancer (H) 2007     Past Surgical History:   Procedure Laterality Date    HERNIA REPAIR      INSERT / REPLACE / REMOVE PROSTHESIS URETHRAL SPHINCTER N/A 06/23/2014    Procedure: ARTIFICIAL URINARY SPHINCTER INSERTION;  Surgeon: Torres Sanabria MD;  Location: [facility];  Service:     INSERT / REPLACE / REMOVE PROSTHESIS URETHRAL SPHINCTER N/A 02/16/2015    Procedure: REMOVAL/REPLACEMENT OF ARTIFICIAL URINARY SPHINCTER COMPONENT;  Surgeon: Torres Sanabria MD;  Location: [facility];  Service:     LUMBAR SPINE SURGERY  01/01/1981    PROSTATECTOMY       Family History   Problem Relation Age of Onset    Prostate Cancer Brother         Social History     Socioeconomic History    Marital status:      Spouse name: Not on file    Number of children: Not on file    Years of education: Not on file    Highest education level: Not on file   Occupational History    Not on file   Tobacco Use    Smoking status: Never    Smokeless tobacco: Never   Substance and Sexual Activity    Alcohol use: No    Drug use: No    Sexual activity: Not on file   Other Topics Concern    Not on file   Social History Narrative    Not on file     Social Determinants of Health     Financial Resource Strain: Not on file   Food Insecurity: Not on file   Transportation Needs: Not on file   Physical Activity: Not on file   Stress: Not on file   Social Connections: Not on file   Intimate Partner Violence: Not on file   Housing Stability: Not on file           Medications  Allergies   Current Outpatient Medications   Medication Sig Dispense Refill    atorvastatin (LIPITOR) 80 MG tablet Take 1 tablet (80 mg) by mouth daily 90 tablet 3    glucosamine-chondroitin 500-400 MG CAPS per capsule Take 1 capsule by mouth daily (Patient not taking: Reported on 6/20/2023)      LUTEIN EXTRACT-ZEAXANTHIN EXT ORAL [LUTEIN EXTRACT-ZEAXANTHIN EXT ORAL] Take 1 tablet by mouth daily.      metoprolol tartrate (LOPRESSOR) 25 MG  tablet Take 1 tablet (25 mg) by mouth 2 times daily 180 tablet 3    nitroGLYcerin (NITROSTAT) 0.4 MG sublingual tablet Place 1 tablet (0.4 mg) under the tongue every 5 minutes as needed for chest pain For chest pain place 1 tablet under the tongue every 5 minutes for 3 doses. If symptoms persist 5 minutes after 1st dose call 911. 25 tablet 0    OMEGA-3/DHA/EPA/FISH OIL (FISH OIL-OMEGA-3 FATTY ACIDS) 300-1,000 mg capsule [OMEGA-3/DHA/EPA/FISH OIL (FISH OIL-OMEGA-3 FATTY ACIDS) 300-1,000 MG CAPSULE] Take 2 g by mouth daily.      rivaroxaban ANTICOAGULANT (XARELTO) 20 MG TABS tablet Take 1 tablet (20 mg) by mouth daily (with dinner) (Patient not taking: Reported on 6/20/2023) 60 tablet 0    Rivaroxaban ANTICOAGULANT 15 & 20 MG TBPK Starter Therapy Pack Take 15 mg by mouth 2 times daily (with meals) for 21 days, THEN 20 mg daily with food for 9 days. 51 each 0    vitamin C (ASCORBIC ACID) 1000 MG TABS          Allergies   Allergen Reactions    Amoxicillin-Pot Clavulanate Anaphylaxis    Benzocaine Unknown     Other reaction(s): Throat Swelling/Closing, Throat swelling    Guaifenesin-Codeine Anaphylaxis     Has tolerated vicodin and percocet w/o issue.          Lab Results    Chemistry/lipid CBC Cardiac Enzymes/BNP/TSH/INR   Recent Labs   Lab Test 05/17/23  1038   CHOL 96   HDL 39*   LDL 49   TRIG 38     Recent Labs   Lab Test 05/17/23  1038 05/27/22  0849 01/21/22  0806   LDL 49 63 70     Recent Labs   Lab Test 05/17/23  1038      POTASSIUM 4.4   CHLORIDE 107   CO2 22   *   BUN 16.2   CR 0.91   GFRESTIMATED 87   TRENTON 8.9     Recent Labs   Lab Test 05/17/23  1038 01/21/22  0806 12/22/20  1154   CR 0.91 0.93 0.96     No results for input(s): A1C in the last 23450 hours.       No results for input(s): WBC, HGB, HCT, MCV, PLT in the last 27385 hours.  No results for input(s): HGB in the last 26085 hours. No results for input(s): TROPONINI in the last 80637 hours.  No results for input(s): BNP, NTBNPI, NTBNP in  the last 69832 hours.  No results for input(s): TSH in the last 64228 hours.  No results for input(s): INR in the last 22591 hours.     Franck Rey MD        Thank you for allowing me to participate in the care of your patient.      Sincerely,     Franck Rey MD     [facility]  cc:   Ej Gonsalez MD  [address]  [place] [address]

## 2023-08-10 NOTE — PROGRESS NOTES
HEART CARE ENCOUNTER CONSULTATON NOTE      [facility]  [phone]      Assessment/Recommendations   Assessment/Plan:  1.  Coronary artery disease.  New patient to me today.  History of myocardial infarction in 2017 with intervention to the left anterior descending and diagonal branch as outlined below.  Reduced ejection fraction previously described with most recent echocardiogram from March 2022 reporting ejection fraction of 55% without identifiable motion abnormality.  He does endorse some dyspnea on exertion with plans to pursue exercise stress echocardiogram to further evaluate.    2.  Dyslipidemia.  Atorvastatin dose had been increased to 80 mg daily when he saw my colleague 1 year ago.  Cholesterol numbers from May 2023 finds a total cholesterol of 96 with an LDL of 49.  He is tolerating this dose of atorvastatin and will continue with the current dosing schedule.    Plan 1.  Exercise stress echocardiogram  2.  Further recommendations pending the results of the exercise stress test with recommendations to continue with current combination of medications and monitor for symptoms.  3.  Encourage daily exercise  4.  Follow-up in 6 months             History of Present Illness/Subjective    HPI: Daniel Jacobs is a 78 year old male new patient to me today.  He was seen March 2022 by my colleague .  He has a history of coronary artery disease per chart review and had a myocardial infarction in 2017 with total occlusion of the mid LAD with a status post thrombectomy and drug-eluting stent x 2 and PTCA of a first diagonal branch at [facility].  He had reduced ejection fraction estimated at 46% with mid to distal anterior, anteroseptal and apical wall motion abnormality.  Most recent echocardiogram was completed April 2022 at which time ejection fraction was said to be 55%.  Trace aortic insufficiency without regional wall motion abnormality.  Patient has been describing  some dyspnea on exertion when he saw my colleague 1 year ago.    He reports that his symptoms at the time of his myocardial infarction was chest pressure he awakened early that morning with symptoms and presented to the ER with the angiographic findings as outlined.  He has not had similar symptoms but does endorse some dyspnea when climbing a hill.  He has no chest discomfort symptoms.  He has no orthopnea or PND.      Review of systems is pertinent for some discomfort in his right lower extremity which prompted ultrasound and evidence for superficial thrombus involving the right greater saphenous vein.  His primary care provider placed him on Xarelto for 6 months and off aspirin with reassessment planned.  In addition he reports some intermittent numbness in his arms and indicates that this is improved after cervical neck injection.  He has some additional EMG studies pending.      Recent Echocardiogram Results:  Reading Physician Reading Date Result Priority   Jennifer Freitas MD  [phone] 2022      Narrative & Impression  [id number]  [id number]  [id number]  [id number]^TROY^[place] [address]     Name: AMITA MARTÍNEZ  MRN: [id number]  : 1945  Study Date: 2022 08:54 AM  Age: 76 yrs  Gender: Male  Patient Location: [facility]  Reason For Study: S/P coronary artery stent placement, Cardiomyopathy,  unspecified  Ordering Physician: KAT SIMMONS  Referring Physician: KAT SIMMONS  Performed By: ACE     BSA: 2.3 m2  Height: 72 in  Weight: 240 lb  HR: 63  BP: 116/68 mmHg  ______________________________________________________________________________  Procedure  Complete Echo Adult.  ______________________________________________________________________________  Interpretation Summary     1. Normal left ventricular size and systolic performance with a visually  estimated ejection fraction of 55%.  2. There is trace aortic  insufficiency.  3. Normal right ventricular size and systolic performance.  ______________________________________________________________________________  Left ventricle:  Normal left ventricular size and systolic performance with a visually  estimated ejection fraction of 55%. There is normal regional wall motion. Left  ventricular wall thickness is normal.     Assessment of LV Diastolic Function: The cumulative findings suggest normal  diastolic filling [The septal e' velocity is < 7 cm/s & lateral e' velocity  is  < 10 cm/s. The average E/e' is < 14. The TR velocity cannot be determined due  to insufficient tricuspid insufficiency signal. Left atrial volume index is  less than 34 mL/mÂ ].     Right ventricle:  Normal right ventricular size and systolic performance.     Left atrium:  The left atrium is of normal size.     Right atrium:  The right atrium is of normal size.     IVC:  The IVC is of normal caliber.     Aortic valve:  The aortic valve is not well visualized, but suspected to be comprised of  three cusps. There is no significant aortic stenosis. There is trace aortic  insufficiency.     Mitral valve:  The mitral valve appears morphologically normal. There is trace mitral  insufficiency.     Tricuspid valve:  The tricuspid valve is grossly morphologically normal. There is trace  tricuspid insufficiency.     Pulmonic valve:  The aortic valve is comprised of three cusps.     Thoracic aorta:  The aortic root and proximal ascending aorta are of normal dimension.     Pericardium:  There is no significant pericardial effusion.      Narrative & Impression       BILATERAL CAROTID ULTRASOUND     Indication: Bilateral hand numbness     Endarterectomy: NA     Previous: None     Symptoms: none     TECHNIQUE: Duplex exam performed utilizing 2D gray-scale imaging, Doppler interrogation with color-flow and spectral waveform analysis.     Right Velocity  Chart (cm/sec)  Location Right   CCA-   CCA- ED 21    ICA-   ICA-ED 34   ECA-   ECA-ED 13   Vetebral-Ante 48   Subclavian A-Tri/Bi 139   Ratio ICA/CCA-PS 1.12   Ratio ICA/CCA-ED 1.61      Left Velocity  Chart (cm/sec)  Location Left   CCA-PS 92   CCA- ED 24   ICA-PSA 99   ICA-ED 28   ECA-PS 89   ECA-ED 13   Vetebral-Ante 61   Subclavian A-Tri/Bi 175   Ratio ICA/CCA-PS 1.08   Ratio ICA/CCA-ED 1.17      Comment:       FINDINGS:     RIGHT: There isminimal  atheromatous plaque.     LEFT: There is minimal atheromatous plaque.     Both vertebral arteries and subclavian artery waveforms are antegrade and normal.     Impression:       1. Normal  diameter stenosis of the right ICA relative to the distal ICA diameter.      2. Normal diameter stenosis of the left ICA relative to the distal ICA diameter.      Evaluation based on velocities and NASCET criteria          Recent Coronary Angiogram Results:    Briseyda Guardado MD - 02/03/2017 10:20 AM CST   Formatting of this note is different from the original.       [facility]   Cardiac Catheterization Report     Name:  AMITA MARTÍNEZ Event Date: 2/3/2017 09:15   [facility] ID #: [id number]    Encounter #: [id number]   Accession #: [id number]   PAO #: [id number] Diagnostic Physician: BRISEYDA GUARDADO   [facility]   Interventional Physician: BRISEYDA GUARDADO   [facility]   Referring Physician: YOB: 1945   Gender: Male   Age: 71     Summary/Conclusions   PRESENTATION / INDICATIONS   ? STEMI   DIAGNOSTIC - CORONARY   ? Right dominant coronary artery system   ? The left main artery has minimal disease.   ? Acute total occlusion of the mid LAD   ? 99% stenosis in the ostial 1st Diagonal   ? The circumflex artery has minimal disease.   ? The RCA has minimal disease.   INTERVENTION   ? Successful angioplasty and stenting (drug eluting) of the LAD   ? Successful angioplasty of the proximal 1st diagonal   ? A thrombectomy catheter was used for  thrombus extraction during the intervention   ? JOY 0 flow pre and JOY 3 flow post.   HEMODYNAMICS   ? The LVEDP is within normal limits   LEFT VENTRICULAR FUNCTION   ? Left ventricular function is normal with EF of 55%   ? Severe hypokinesis noted in the apical region. Cannot rule out thrombus.   RECOMMENDATIONS & PLAN   ? Medical Rx - Aspirin: 81 mg daily   ? Brilinta 90mg bid   ? Optimize risk factors and medications   ? Optimize treatment for reduced LV systolic function   ? Consider cMRI to rule out thrombus in apex.        Physical Examination  Review of Systems   Vitals: 104/78, weight 237 pounds, heart rate of 64 and regular  Wt Readings from Last 3 Encounters:   06/20/23 106.6 kg (235 lb)   05/17/23 106.9 kg (235 lb 9.6 oz)   04/14/22 108.4 kg (238 lb 14.4 oz)       General Appearance:   no distress, normal body habitus   ENT/Mouth: membranes moist,    EYES:  no scleral icterus, normal conjunctivae   Neck: no carotid bruits or thyromegaly   Chest/Lungs:   lungs are clear to auscultation, no rales or wheezing, equal chest wall expansion    Cardiovascular:   Regular. Normal first and second heart sounds with no murmurs, rubs, or gallops; the carotid, radial and posterior tibial pulses are intact, Jugular venous pressure within normal limits, no significant edema bilaterally    Abdomen:  no  bruits, or tenderness; bowel sounds are present   Extremities: no cyanosis or clubbing   Skin: no xanthelasma, warm.    Neurologic:  no tremors     Psychiatric: alert and oriented x3, calm        Please refer above for cardiac ROS details.        Medical History  Surgical History Family History Social History   Past Medical History:   Diagnosis Date     Cardiac abnormality      Low back pain      Prostate cancer (H) 2007     Past Surgical History:   Procedure Laterality Date     HERNIA REPAIR       INSERT / REPLACE / REMOVE PROSTHESIS URETHRAL SPHINCTER N/A 06/23/2014    Procedure: ARTIFICIAL URINARY SPHINCTER INSERTION;   Surgeon: Torres Sanabria MD;  Location: [facility];  Service:      INSERT / REPLACE / REMOVE PROSTHESIS URETHRAL SPHINCTER N/A 02/16/2015    Procedure: REMOVAL/REPLACEMENT OF ARTIFICIAL URINARY SPHINCTER COMPONENT;  Surgeon: Torres Sanabria MD;  Location: [facility];  Service:      LUMBAR SPINE SURGERY  01/01/1981     PROSTATECTOMY       Family History   Problem Relation Age of Onset     Prostate Cancer Brother         Social History     Socioeconomic History     Marital status:      Spouse name: Not on file     Number of children: Not on file     Years of education: Not on file     Highest education level: Not on file   Occupational History     Not on file   Tobacco Use     Smoking status: Never     Smokeless tobacco: Never   Substance and Sexual Activity     Alcohol use: No     Drug use: No     Sexual activity: Not on file   Other Topics Concern     Not on file   Social History Narrative     Not on file     Social Determinants of Health     Financial Resource Strain: Not on file   Food Insecurity: Not on file   Transportation Needs: Not on file   Physical Activity: Not on file   Stress: Not on file   Social Connections: Not on file   Intimate Partner Violence: Not on file   Housing Stability: Not on file           Medications  Allergies   Current Outpatient Medications   Medication Sig Dispense Refill     atorvastatin (LIPITOR) 80 MG tablet Take 1 tablet (80 mg) by mouth daily 90 tablet 3     glucosamine-chondroitin 500-400 MG CAPS per capsule Take 1 capsule by mouth daily (Patient not taking: Reported on 6/20/2023)       LUTEIN EXTRACT-ZEAXANTHIN EXT ORAL [LUTEIN EXTRACT-ZEAXANTHIN EXT ORAL] Take 1 tablet by mouth daily.       metoprolol tartrate (LOPRESSOR) 25 MG tablet Take 1 tablet (25 mg) by mouth 2 times daily 180 tablet 3     nitroGLYcerin (NITROSTAT) 0.4 MG sublingual tablet Place 1 tablet (0.4 mg) under the tongue every 5 minutes as needed for chest pain For chest pain place 1  tablet under the tongue every 5 minutes for 3 doses. If symptoms persist 5 minutes after 1st dose call 911. 25 tablet 0     OMEGA-3/DHA/EPA/FISH OIL (FISH OIL-OMEGA-3 FATTY ACIDS) 300-1,000 mg capsule [OMEGA-3/DHA/EPA/FISH OIL (FISH OIL-OMEGA-3 FATTY ACIDS) 300-1,000 MG CAPSULE] Take 2 g by mouth daily.       rivaroxaban ANTICOAGULANT (XARELTO) 20 MG TABS tablet Take 1 tablet (20 mg) by mouth daily (with dinner) (Patient not taking: Reported on 6/20/2023) 60 tablet 0     Rivaroxaban ANTICOAGULANT 15 & 20 MG TBPK Starter Therapy Pack Take 15 mg by mouth 2 times daily (with meals) for 21 days, THEN 20 mg daily with food for 9 days. 51 each 0     vitamin C (ASCORBIC ACID) 1000 MG TABS          Allergies   Allergen Reactions     Amoxicillin-Pot Clavulanate Anaphylaxis     Benzocaine Unknown     Other reaction(s): Throat Swelling/Closing, Throat swelling     Guaifenesin-Codeine Anaphylaxis     Has tolerated vicodin and percocet w/o issue.          Lab Results    Chemistry/lipid CBC Cardiac Enzymes/BNP/TSH/INR   Recent Labs   Lab Test 05/17/23  1038   CHOL 96   HDL 39*   LDL 49   TRIG 38     Recent Labs   Lab Test 05/17/23  1038 05/27/22  0849 01/21/22  0806   LDL 49 63 70     Recent Labs   Lab Test 05/17/23  1038      POTASSIUM 4.4   CHLORIDE 107   CO2 22   *   BUN 16.2   CR 0.91   GFRESTIMATED 87   TRENTON 8.9     Recent Labs   Lab Test 05/17/23  1038 01/21/22  0806 12/22/20  1154   CR 0.91 0.93 0.96     No results for input(s): A1C in the last 10962 hours.       No results for input(s): WBC, HGB, HCT, MCV, PLT in the last 53187 hours.  No results for input(s): HGB in the last 55671 hours. No results for input(s): TROPONINI in the last 30634 hours.  No results for input(s): BNP, NTBNPI, NTBNP in the last 67640 hours.  No results for input(s): TSH in the last 60676 hours.  No results for input(s): INR in the last 65601 hours.     Franck Rey MD

## 2023-08-18 ENCOUNTER — HOSPITAL ENCOUNTER (OUTPATIENT)
Dept: CARDIOLOGY | Facility: CLINIC | Age: 78
Discharge: HOME | End: 2023-08-18
Attending: INTERNAL MEDICINE | Admitting: INTERNAL MEDICINE
Payer: COMMERCIAL

## 2023-08-18 DIAGNOSIS — Z95.5 S/P CORONARY ARTERY STENT PLACEMENT: ICD-10-CM

## 2023-08-18 DIAGNOSIS — Z13.220 ENCOUNTER FOR SCREENING FOR LIPOID DISORDERS: ICD-10-CM

## 2023-08-18 DIAGNOSIS — I25.10 CORONARY ARTERY DISEASE INVOLVING NATIVE HEART WITHOUT ANGINA PECTORIS, UNSPECIFIED VESSEL OR LESION TYPE: ICD-10-CM

## 2023-08-18 PROCEDURE — 93016 CV STRESS TEST SUPVJ ONLY: CPT | Performed by: GENERAL ACUTE CARE HOSPITAL

## 2023-08-18 PROCEDURE — 93017 CV STRESS TEST TRACING ONLY: CPT

## 2023-08-18 PROCEDURE — 93321 DOPPLER ECHO F-UP/LMTD STD: CPT | Mod: 26 | Performed by: GENERAL ACUTE CARE HOSPITAL

## 2023-08-18 PROCEDURE — 93350 STRESS TTE ONLY: CPT | Mod: 26 | Performed by: GENERAL ACUTE CARE HOSPITAL

## 2023-08-18 PROCEDURE — 255N000002 HC RX 255 OP 636: Performed by: INTERNAL MEDICINE

## 2023-08-18 PROCEDURE — 93325 DOPPLER ECHO COLOR FLOW MAPG: CPT | Mod: 26 | Performed by: GENERAL ACUTE CARE HOSPITAL

## 2023-08-18 PROCEDURE — 93352 ADMIN ECG CONTRAST AGENT: CPT | Performed by: GENERAL ACUTE CARE HOSPITAL

## 2023-08-18 PROCEDURE — 93018 CV STRESS TEST I&R ONLY: CPT | Performed by: GENERAL ACUTE CARE HOSPITAL

## 2023-08-18 RX ADMIN — PERFLUTREN 4 ML: 6.52 INJECTION, SUSPENSION INTRAVENOUS at 10:30

## 2023-08-23 ENCOUNTER — TRANSFERRED RECORDS (OUTPATIENT)
Dept: HEALTH INFORMATION MANAGEMENT | Facility: CLINIC | Age: 78
End: 2023-08-23
Payer: COMMERCIAL

## 2023-08-30 ENCOUNTER — TRANSFERRED RECORDS (OUTPATIENT)
Dept: HEALTH INFORMATION MANAGEMENT | Facility: CLINIC | Age: 78
End: 2023-08-30
Payer: COMMERCIAL

## 2023-09-01 ENCOUNTER — TRANSFERRED RECORDS (OUTPATIENT)
Dept: HEALTH INFORMATION MANAGEMENT | Facility: CLINIC | Age: 78
End: 2023-09-01
Payer: COMMERCIAL

## 2023-09-12 ENCOUNTER — TRANSFERRED RECORDS (OUTPATIENT)
Dept: HEALTH INFORMATION MANAGEMENT | Facility: CLINIC | Age: 78
End: 2023-09-12
Payer: COMMERCIAL

## 2023-09-26 ENCOUNTER — TRANSFERRED RECORDS (OUTPATIENT)
Dept: HEALTH INFORMATION MANAGEMENT | Facility: CLINIC | Age: 78
End: 2023-09-26
Payer: COMMERCIAL

## 2023-09-28 ENCOUNTER — TRANSFERRED RECORDS (OUTPATIENT)
Dept: HEALTH INFORMATION MANAGEMENT | Facility: CLINIC | Age: 78
End: 2023-09-28
Payer: COMMERCIAL

## 2023-09-29 ENCOUNTER — OFFICE VISIT (OUTPATIENT)
Dept: FAMILY MEDICINE | Facility: CLINIC | Age: 78
End: 2023-09-29
Payer: COMMERCIAL

## 2023-09-29 VITALS
SYSTOLIC BLOOD PRESSURE: 110 MMHG | HEART RATE: 74 BPM | OXYGEN SATURATION: 95 % | HEIGHT: 72 IN | TEMPERATURE: 98 F | DIASTOLIC BLOOD PRESSURE: 72 MMHG | WEIGHT: 238.1 LBS | BODY MASS INDEX: 32.25 KG/M2 | RESPIRATION RATE: 16 BRPM

## 2023-09-29 DIAGNOSIS — I42.9 CARDIOMYOPATHY, UNSPECIFIED TYPE (H): Primary | ICD-10-CM

## 2023-09-29 DIAGNOSIS — Z85.46 HISTORY OF PROSTATE CANCER: ICD-10-CM

## 2023-09-29 DIAGNOSIS — I82.811 SUPERFICIAL THROMBOSIS OF RIGHT LOWER EXTREMITY: ICD-10-CM

## 2023-09-29 DIAGNOSIS — G56.03 BILATERAL CARPAL TUNNEL SYNDROME: ICD-10-CM

## 2023-09-29 DIAGNOSIS — R05.1 ACUTE COUGH: ICD-10-CM

## 2023-09-29 PROCEDURE — 99214 OFFICE O/P EST MOD 30 MIN: CPT | Mod: 25 | Performed by: NURSE PRACTITIONER

## 2023-09-29 PROCEDURE — 90662 IIV NO PRSV INCREASED AG IM: CPT | Performed by: NURSE PRACTITIONER

## 2023-09-29 PROCEDURE — G0008 ADMIN INFLUENZA VIRUS VAC: HCPCS | Performed by: NURSE PRACTITIONER

## 2023-09-29 RX ORDER — ASPIRIN 81 MG/1
81 TABLET ORAL DAILY
COMMUNITY
Start: 2023-09-29

## 2023-09-29 ASSESSMENT — PAIN SCALES - GENERAL: PAINLEVEL: NO PAIN (0)

## 2023-09-29 NOTE — PATIENT INSTRUCTIONS
If you get another one of those coughs that don't quit, let me know and we can try some things.    Flu shot given today.    Good luck with the other wrist!    If we develop another one of those superficial thrombosis, we will want to connect with a hematologist/blood specialist.

## 2023-09-29 NOTE — PROGRESS NOTES
Assessment & Plan     ICD-10-CM    1. Cardiomyopathy, unspecified type (H)  I42.9       2. Superficial thrombosis of right lower extremity  I82.811       3. Bilateral carpal tunnel syndrome  G56.03       4. Acute cough  R05.1       5. History of prostate cancer  Z85.46         Healing well from carpal tunnel surgery.  Continue working with orthopedics.  Recently saw cardiology and had a stress test which was reassuring.  Continue recommendations that they provided.  We elected to consider recheck of PSA at follow-up in the spring.  Has been undetectable since prostate cancer in 2007.  No further evidence of superficial thrombosis.  Okay to continue 81 mg aspirin.  If cough recurs, send me a message and we can try a round of steroids, inhaler, PFT, pulmonary medicine, etc.  Agree with metoprolol half tablets instead of full tablets.  Continue same high intensity statin therapy.  Flu shot given.    Reviewed cardiology note x1, orthopedic note x1, stress test x1, family medicine note x1    Subjective     HPI     Cough  Seen earlier this summer.  Thought to be postnasal drip.  Given fluticasone nasal spray and benzonatate. Neither seemed to help much.  Whenever he gets a cold, he seems to get these lingering coughs that last for multiple weeks at a time. Symptoms have started to get better over time.      Carpal tunnel   Saw orthopedics.  Left wrist done recently.  Recovering well.  Plan is for the right later this fall.      Superficial thrombosis  Diagnosed earlier this year.  Given prescription for Xarelto.  Swelling and stiffness has gone down.  No further pain.  No redness.    Review of Systems - negative except for what's listed in the HPI      Objective    /72 (BP Location: Right arm, Patient Position: Sitting, Cuff Size: Adult Regular)   Pulse 74   Temp 98  F (36.7  C) (Oral)   Resp 16   Ht 1.829 m (6')   Wt 108 kg (238 lb 1.6 oz)   SpO2 95%   BMI 32.29 kg/m    Physical Exam   General appearance -  alert, well appearing, and in no distress  Mental status - alert, oriented to person, place, and time  Ears - bilateral TM's and external ear canals normal  Mouth - mucous membranes moist. No oral lesions.  Lymphatics - no palpable lymphadenopathy  Chest - clear to auscultation, no wheezes, rales or rhonchi, symmetric air entry  Heart - normal rate and regular rhythm, S1 and S2 normal, no murmurs noted  Neurological - alert, oriented, normal speech, no focal findings or movement disorder noted, neck supple without rigidity, cranial nerves II through XII intact, motor and sensory grossly normal bilaterally, normal muscle tone, no tremors, strength 5/5  Musculoskeletal - no joint tenderness, deformity or swelling  Extremities - peripheral pulses normal, trace right lower extremity edema.  Right calf measures about 1 cm larger than left.  Varicose veins noted bilaterally.  Left hand capillary refill <3 seconds.  Skin - normal coloration and turgor.    Goran Mckeon, CNP    This note has been dictated using voice recognition software. Any grammatical or context distortions are unintentional and inherent to the software.

## 2023-10-12 ENCOUNTER — TRANSFERRED RECORDS (OUTPATIENT)
Dept: HEALTH INFORMATION MANAGEMENT | Facility: CLINIC | Age: 78
End: 2023-10-12
Payer: COMMERCIAL

## 2023-10-26 ENCOUNTER — TRANSFERRED RECORDS (OUTPATIENT)
Dept: HEALTH INFORMATION MANAGEMENT | Facility: CLINIC | Age: 78
End: 2023-10-26
Payer: COMMERCIAL

## 2023-12-26 ENCOUNTER — NURSE TRIAGE (OUTPATIENT)
Dept: FAMILY MEDICINE | Facility: CLINIC | Age: 78
End: 2023-12-26
Payer: COMMERCIAL

## 2023-12-26 DIAGNOSIS — U07.1 INFECTION DUE TO 2019 NOVEL CORONAVIRUS: Primary | ICD-10-CM

## 2023-12-26 NOTE — TELEPHONE ENCOUNTER
RN COVID TREATMENT VISIT  12/26/23      The patient has been triaged and does not require a higher level of care.    Daniel Jacobs  78 year old  Current weight? 238 lbs    Has the patient been seen by a primary care provider at an [facility] or [facility] within the past two years? Yes.   Have you been in close proximity to/do you have a known exposure to a person with a confirmed case of influenza? No.     General treatment eligibility:  Date of positive COVID test (PCR or at home)?  12/26/23    Are you or have you been hospitalized for this COVID-19 infection? No.   Have you received monoclonal antibodies or antiviral treatment for COVID-19 since this positive test? No.   Do you have any of the following conditions that place you at risk of being very sick from COVID-19?   - Age 50 years or older  Yes, patient has at least one high risk condition as noted above.     Current COVID symptoms:   - fever or chills  - cough  - fatigue  - muscle or body aches  - headache  - sore throat  - congestion or runny nose  Yes. Patient has at least one symptom as selected.     How many days since symptoms started? 5 days or less. Established patient, 12 years or older weighing at least 88.2 lbs, who has symptoms that started in the past 5 days, has not been hospitalized nor received treatment already, and is at risk for being very sick from COVID-19.     Treatment eligibility by RN:  Are you currently pregnant or nursing? No  Do you have a clinically significant hypersensitivity to nirmatrelvir or ritonavir, or toxic epidermal necrolysis (TEN) or Clark-Jerry Syndrome? No  Do you have a history of hepatitis, any hepatic impairment on the Problem List (such as Child-Potts Class C, cirrhosis, fatty liver disease, alcoholic liver disease), or was the last liver lab (hepatic panel, ALT, AST, ALK Phos, bilirubin) elevated in the past 6 months? No  Do you have any history of severe renal impairment  (eGFR < 30mL/min)? No    Is patient eligible to continue? Yes, patient meets all eligibility requirements for the RN COVID treatment (as denoted by all no responses above).     Current Outpatient Medications   Medication Sig Dispense Refill    aspirin 81 MG EC tablet Take 1 tablet (81 mg) by mouth daily      atorvastatin (LIPITOR) 80 MG tablet Take 1 tablet (80 mg) by mouth daily 90 tablet 3    glucosamine-chondroitin 500-400 MG CAPS per capsule Take 1 capsule by mouth daily      ketoconazole (NIZORAL) 2 % external cream Apply topically as needed      LUTEIN EXTRACT-ZEAXANTHIN EXT ORAL [LUTEIN EXTRACT-ZEAXANTHIN EXT ORAL] Take 1 tablet by mouth daily.      metoprolol tartrate (LOPRESSOR) 25 MG tablet Take 1 tablet (25 mg) by mouth 2 times daily (Patient taking differently: Take 12.5 mg by mouth 2 times daily) 180 tablet 3    nitroGLYcerin (NITROSTAT) 0.4 MG sublingual tablet Place 1 tablet (0.4 mg) under the tongue every 5 minutes as needed for chest pain For chest pain place 1 tablet under the tongue every 5 minutes for 3 doses. If symptoms persist 5 minutes after 1st dose call 911. 25 tablet 0    vitamin C (ASCORBIC ACID) 1000 MG TABS Take 1,000 mg by mouth daily         Medications from List 1 of the standing order (on medications that exclude the use of Paxlovid) that patient is taking: NONE. Is patient taking Rene's Wort? No  Is patient taking Rene's Wort or any meds from List 1? No.   Medications from List 2 of the standing order (on meds that provider needs to adjust) that patient is taking: NONE. Is patient on any of the meds from List 2? No.   Medications from List 3 of standing order (on meds that a RN needs to adjust) that patient is taking: atorvastatin (Lipitor): Instructed patient to stop atorvastatin while taking Paxlovid and restart atorvastatin 1 day after the completion of Paxlovid.  Is patient on any meds from List 3? Yes. Patient is on meds from list 3. No meds require a provider visit  and at least one med required RN to adjust.     Paxlovid has an approximate 90% reduction in hospitalization. Paxlovid can possibly cause altered sense of taste, diarrhea (loose, watery stools), high blood pressure, muscle aches.     Would patient like a Paxlovid prescription?   Yes.   Lab Results   Component Value Date    GFRESTIMATED 87 05/17/2023       Was last eGFR reduced? No, eGFR 60 or greater/ No Result on record. Patient can receive the normal renal function dose. Paxlovid Rx sent to [facility]    Temporary change to home medications: Stop atorvastatin 8 days    All medication adjustments (holds, etc) were discussed with the patient and patient was asked to repeat back (teachback) their med adjustment.  Did patient understand med adjustment? Yes, patient repeated back and understood correctly.        Reviewed the following instructions with the patient:    Paxlovid (nimatrelvir and ritonavir)    How it works  Two medicines (nirmatrelvir and ritonavir) are taken together. They stop the virus from growing. Less amount of virus is easier for your body to fight.    How to take  Medicine comes in a daily container with both medicine tablets. Take by mouth twice daily (once in the morning, once at night) for 5 days.  The number of tablets to take varies by patient.  Don't chew or break capsules. Swallow whole.    When to take  Take as soon as possible after positive COVID-19 test result, and within 5 days of your first symptoms.    Possible side effects  Can cause altered sense of taste, diarrhea (loose, watery stools), high blood pressure, muscle aches.    Franck Cm RN       Reason for Disposition   HIGH RISK patient (e.g., weak immune system, age > 64 years, obesity with BMI of 30 or higher, pregnant, chronic lung disease or other chronic medical condition) and COVID symptoms (e.g., cough, fever)  (Exceptions: Already seen by doctor or NP/PA and no new or worsening  symptoms.)    Additional Information   Negative: SEVERE difficulty breathing (e.g., struggling for each breath, speaks in single words)   Negative: Difficult to awaken or acting confused (e.g., disoriented, slurred speech)   Negative: Bluish (or gray) lips or face now   Negative: Shock suspected (e.g., cold/pale/clammy skin, too weak to stand, low BP, rapid pulse)   Negative: Sounds like a life-threatening emergency to the triager   Negative: SEVERE or constant chest pain or pressure  (Exception: Mild central chest pain, present only when coughing.)   Negative: MODERATE difficulty breathing (e.g., speaks in phrases, SOB even at rest, pulse 100-120)   Negative: Headache and stiff neck (can't touch chin to chest)   Negative: Oxygen level (e.g., pulse oximetry) 90% or lower   Negative: Chest pain or pressure  (Exception: MILD central chest pain, present only when coughing.)   Negative: Drinking very little and dehydration suspected (e.g., no urine > 12 hours, very dry mouth, very lightheaded)   Negative: Patient sounds very sick or weak to the triager   Negative: MILD difficulty breathing (e.g., minimal/no SOB at rest, SOB with walking, pulse <100)   Negative: Fever > 103 F (39.4 C)   Negative: Fever > 101 F (38.3 C) and over 60 years of age   Negative: Fever > 100.0 F (37.8 C) and bedridden (e.g., CVA, chronic illness, recovering from surgery)    Protocols used: Coronavirus (COVID-19) Diagnosed or [id number]

## 2023-12-26 NOTE — TELEPHONE ENCOUNTER
COVID Positive/Requesting COVID treatment    Patient is positive for COVID and requesting treatment options.    Date of positive COVID test (PCR or at home)? 12/26/23, home  Current COVID symptoms: fever or chills, cough, fatigue, muscle or body aches, headache, sore throat, and congestion or runny nose  Date COVID symptoms began: 12/24/23    Message should be routed to clinic RN pool. Best phone number to use for call back: [phone]

## 2023-12-26 NOTE — TELEPHONE ENCOUNTER
General Call    Contacts         Type Contact Phone/Fax    12/26/2023 10:15 AM CST Phone (Incoming) BangmegeovanniKaren (Emergency Contact) [phone]          Reason for Call:  persistent cough     What are your questions or concerns:  patient's wife on consent to communicate, states that patients cough is not improving and states at last appointment to call if cough has persisted and not gotten better with previous prescribed treatment. Please advise if patient needs to be seen  Date of last appointment with provider: 9/29/23    Could we send this information to you in [place] or would you prefer to receive a phone call?:   Patient would prefer a phone call   Okay to leave a detailed message?: Yes at Home number on file [phone] (home)

## 2024-03-26 ENCOUNTER — TRANSFERRED RECORDS (OUTPATIENT)
Dept: HEALTH INFORMATION MANAGEMENT | Facility: CLINIC | Age: 79
End: 2024-03-26
Payer: COMMERCIAL

## 2024-03-28 ENCOUNTER — TRANSFERRED RECORDS (OUTPATIENT)
Dept: HEALTH INFORMATION MANAGEMENT | Facility: CLINIC | Age: 79
End: 2024-03-28
Payer: COMMERCIAL

## 2024-04-17 ENCOUNTER — PATIENT OUTREACH (OUTPATIENT)
Dept: CARE COORDINATION | Facility: CLINIC | Age: 79
End: 2024-04-17
Payer: COMMERCIAL

## 2024-04-29 ENCOUNTER — TRANSFERRED RECORDS (OUTPATIENT)
Dept: HEALTH INFORMATION MANAGEMENT | Facility: CLINIC | Age: 79
End: 2024-04-29
Payer: COMMERCIAL

## 2024-05-14 SDOH — HEALTH STABILITY: PHYSICAL HEALTH: ON AVERAGE, HOW MANY DAYS PER WEEK DO YOU ENGAGE IN MODERATE TO STRENUOUS EXERCISE (LIKE A BRISK WALK)?: 3 DAYS

## 2024-05-14 SDOH — HEALTH STABILITY: PHYSICAL HEALTH: ON AVERAGE, HOW MANY MINUTES DO YOU ENGAGE IN EXERCISE AT THIS LEVEL?: 40 MIN

## 2024-05-14 ASSESSMENT — SOCIAL DETERMINANTS OF HEALTH (SDOH): HOW OFTEN DO YOU GET TOGETHER WITH FRIENDS OR RELATIVES?: THREE TIMES A WEEK

## 2024-05-21 ENCOUNTER — OFFICE VISIT (OUTPATIENT)
Dept: FAMILY MEDICINE | Facility: CLINIC | Age: 79
End: 2024-05-21
Payer: COMMERCIAL

## 2024-05-21 VITALS
BODY MASS INDEX: 33.12 KG/M2 | RESPIRATION RATE: 18 BRPM | HEIGHT: 71 IN | DIASTOLIC BLOOD PRESSURE: 64 MMHG | OXYGEN SATURATION: 95 % | TEMPERATURE: 97.6 F | WEIGHT: 236.6 LBS | SYSTOLIC BLOOD PRESSURE: 96 MMHG | HEART RATE: 67 BPM

## 2024-05-21 DIAGNOSIS — I25.83 CORONARY ARTERY DISEASE DUE TO LIPID RICH PLAQUE: ICD-10-CM

## 2024-05-21 DIAGNOSIS — Z95.5 S/P CORONARY ARTERY STENT PLACEMENT: ICD-10-CM

## 2024-05-21 DIAGNOSIS — K21.00 GASTROESOPHAGEAL REFLUX DISEASE WITH ESOPHAGITIS WITHOUT HEMORRHAGE: ICD-10-CM

## 2024-05-21 DIAGNOSIS — Z00.00 ENCOUNTER FOR MEDICARE ANNUAL WELLNESS EXAM: Primary | ICD-10-CM

## 2024-05-21 DIAGNOSIS — I25.10 CORONARY ARTERY DISEASE DUE TO LIPID RICH PLAQUE: ICD-10-CM

## 2024-05-21 LAB
ERYTHROCYTE [DISTWIDTH] IN BLOOD BY AUTOMATED COUNT: 12.8 % (ref 10–15)
HCT VFR BLD AUTO: 42.5 % (ref 40–53)
HGB BLD-MCNC: 13.9 G/DL (ref 13.3–17.7)
MCH RBC QN AUTO: 31.4 PG (ref 26.5–33)
MCHC RBC AUTO-ENTMCNC: 32.7 G/DL (ref 31.5–36.5)
MCV RBC AUTO: 96 FL (ref 78–100)
PLATELET # BLD AUTO: 239 10E3/UL (ref 150–450)
RBC # BLD AUTO: 4.42 10E6/UL (ref 4.4–5.9)
WBC # BLD AUTO: 4.9 10E3/UL (ref 4–11)

## 2024-05-21 PROCEDURE — G0439 PPPS, SUBSEQ VISIT: HCPCS | Performed by: NURSE PRACTITIONER

## 2024-05-21 PROCEDURE — 80061 LIPID PANEL: CPT | Performed by: NURSE PRACTITIONER

## 2024-05-21 PROCEDURE — 85027 COMPLETE CBC AUTOMATED: CPT | Performed by: NURSE PRACTITIONER

## 2024-05-21 PROCEDURE — 36415 COLL VENOUS BLD VENIPUNCTURE: CPT | Performed by: NURSE PRACTITIONER

## 2024-05-21 PROCEDURE — 80053 COMPREHEN METABOLIC PANEL: CPT | Performed by: NURSE PRACTITIONER

## 2024-05-21 PROCEDURE — 99214 OFFICE O/P EST MOD 30 MIN: CPT | Mod: 25 | Performed by: NURSE PRACTITIONER

## 2024-05-21 RX ORDER — ATORVASTATIN CALCIUM 80 MG/1
80 TABLET, FILM COATED ORAL DAILY
Qty: 90 TABLET | Refills: 3 | Status: SHIPPED | OUTPATIENT
Start: 2024-05-21

## 2024-05-21 RX ORDER — METOPROLOL TARTRATE 25 MG/1
12.5 TABLET, FILM COATED ORAL 2 TIMES DAILY
Qty: 90 TABLET | Refills: 3 | Status: SHIPPED | OUTPATIENT
Start: 2024-05-21

## 2024-05-21 RX ORDER — RESPIRATORY SYNCYTIAL VIRUS VACCINE 120MCG/0.5
0.5 KIT INTRAMUSCULAR ONCE
Qty: 1 EACH | Refills: 0 | Status: CANCELLED | OUTPATIENT
Start: 2024-05-21 | End: 2024-05-21

## 2024-05-21 ASSESSMENT — PAIN SCALES - GENERAL: PAINLEVEL: NO PAIN (0)

## 2024-05-21 NOTE — PROGRESS NOTES
"Preventive Care Visit  [facility]  Goran Mckeon NP,    May 21, 2024      Assessment & Plan       ICD-10-CM    1. Encounter for Medicare annual wellness exam  Z00.00       2. Coronary artery disease due to lipid rich plaque  I25.10 Lipid panel reflex to direct LDL Non-fasting    I25.83 atorvastatin (LIPITOR) 80 MG tablet     Comprehensive metabolic panel (BMP + Alb, Alk Phos, ALT, AST, Total. Bili, TP)     Lipid panel     CBC with platelets     Lipid panel reflex to direct LDL Non-fasting     Comprehensive metabolic panel (BMP + Alb, Alk Phos, ALT, AST, Total. Bili, TP)     CBC with platelets     CANCELED: Lipid panel      3. S/P coronary artery stent placement  Z95.5 metoprolol tartrate (LOPRESSOR) 25 MG tablet      4. Gastroesophageal reflux disease with esophagitis without hemorrhage  K21.00         Update screening/med monitoring labs.  Corrected metoprolol prescription sent to the pharmacy.  Reviewed trying OTC omeprazole to see if it helps with the cough.  If it does not, let me know.  No signs or symptoms of major respiratory illness, pulmonary source, or cardiac origin.      BMI  Estimated body mass index is 32.77 kg/m  as calculated from the following:    Height as of this encounter: 1.81 m (5' 11.25\").    Weight as of this encounter: 107.3 kg (236 lb 9.6 oz).   Weight management plan: Discussed healthy diet and exercise guidelines    Counseling  Appropriate preventive services were discussed with this patient, including applicable screening as appropriate for fall prevention, nutrition, physical activity, Tobacco-use cessation, weight loss and cognition.  Checklist reviewing preventive services available has been given to the patient.    Aroldo Sanchez is a 79 year old, presenting for the following:  Annual Visit (Non fasting), Knee Pain (Intermittent left knee pain- worse with movement), Back Pain (Chronic low back pain has been getting worse), and Cough (Sporadic cough " at night)        5/21/2024     2:11 PM   Additional Questions   Roomed by Catherine Corona [facility]       Health Care Directive  Patient does not have a Health Care Directive or Living Will: previously reviewed.    HPI    Left knee pain/low back pain  Has been worked up through [facility] for this and noted to have arthritis.  Previously we have talked about ways to manage arthritic pains including Biofreeze and over-the-counter CBD.  He wonders about Synvisc injections.    Cough  Happens after eating at times.  Will get reflux and sour taste in the mouth that correlates with the cough at times.    CAD/hypertension history  Continues with high intensity statin therapy and metoprolol 12.5 mg twice daily.        5/14/2024   General Health   How would you rate your overall physical health? Good   Feel stress (tense, anxious, or unable to sleep) Not at all         5/14/2024   Nutrition   Diet: Regular (no restrictions)         5/14/2024   Exercise   Days per week of moderate/strenous exercise 3 days   Average minutes spent exercising at this level 40 min         5/14/2024   Social Factors   Frequency of gathering with friends or relatives Three times a week   Worry food won't last until get money to buy more No   Food not last or not have enough money for food? No   Do you have housing?  Yes   Are you worried about losing your housing? No   Lack of transportation? No   Unable to get utilities (heat,electricity)? No         5/20/2024   Fall Risk   Fallen 2 or more times in the past year? No   Trouble with walking or balance? No          5/14/2024   Activities of Daily Living- Home Safety   Needs help with the following daily activites None of the above   Safety concerns in the home None of the above         5/14/2024   Dental   Dentist two times every year? Yes         5/14/2024   Hearing Screening   Hearing concerns? (!) IT'S HARD TO FOLLOW A CONVERSATION IN A NOISY RESTAURANT OR CROWDED ROOM.    (!) TROUBLE  UNDERSTANDING SOFT OR WHISPERED SPEECH.         5/14/2024   Driving Risk Screening   Patient/family members have concerns about driving No         5/14/2024   General Alertness/Fatigue Screening   Have you been more tired than usual lately? No         5/14/2024   Urinary Incontinence Screening   Bothered by leaking urine in past 6 months Yes         5/14/2024   TB Screening   Were you born outside of the [place]? No         Today's PHQ-2 Score:       5/20/2024     4:07 PM   PHQ-2 ( 1999 Pf[employer])   Q1: Little interest or pleasure in doing things 0   Q2: Feeling down, depressed or hopeless 0   PHQ-2 Score 0   Q1: Little interest or pleasure in doing things Not at all   Q2: Feeling down, depressed or hopeless Not at all   PHQ-2 Score 0           5/14/2024   Substance Use   Alcohol more than 3/day or more than 7/wk No   Do you have a current opioid prescription? No   How severe/bad is pain from 1 to 10? 6/10   Do you use any other substances recreationally? No     Social History     Tobacco Use    Smoking status: Never    Smokeless tobacco: Never   Vaping Use    Vaping status: Never Used   Substance Use Topics    Alcohol use: No    Drug use: No       ASCVD Risk   The ASCVD Risk score (Candida DK, et al., 2019) failed to calculate for the following reasons:    The valid total cholesterol range is 130 to 320 mg/dL    Reviewed and updated as needed this visit by Provider                    Past Medical History:   Diagnosis Date    Cardiac abnormality     Low back pain     Prostate cancer (H) 2007    Superficial thrombosis of right lower extremity 05/17/2023     Past Surgical History:   Procedure Laterality Date    CARPAL TUNNEL RELEASE RT/LT Left     HERNIA REPAIR      INSERT / REPLACE / REMOVE PROSTHESIS URETHRAL SPHINCTER N/A 06/23/2014    Procedure: ARTIFICIAL URINARY SPHINCTER INSERTION;  Surgeon: Torres Sanabria MD;  Location: [facility];  Service:     INSERT / REPLACE / REMOVE PROSTHESIS URETHRAL SPHINCTER  "N/A 02/16/2015    Procedure: REMOVAL/REPLACEMENT OF ARTIFICIAL URINARY SPHINCTER COMPONENT;  Surgeon: Torres Sanabria MD;  Location: [facility];  Service:     LUMBAR SPINE SURGERY  01/01/1981    PROSTATECTOMY       Current providers sharing in care for this patient include:  Patient Care Team:  Goran Mckeon NP as PCP - General  Goran Mckeon NP as Assigned PCP  Franck Rey MD as MD (Cardiovascular Disease)  Franck Rey MD as Assigned Heart and Vascular Provider    The following health maintenance items are reviewed in Epic and correct as of today:  Health Maintenance   Topic Date Due    ANNUAL REVIEW OF  ORDERS  Never done    ZOSTER IMMUNIZATION (1 of 2) Never done    RSV VACCINE (Pregnancy & 60+) (1 - 1-dose 60+ series) Never done    COVID-19 Vaccine (5 - 2023-24 season) 09/01/2023    LIPID  05/17/2024    MEDICARE ANNUAL WELLNESS VISIT  05/17/2024    FALL RISK ASSESSMENT  05/21/2025    GLUCOSE  05/17/2026    COLORECTAL CANCER SCREENING  09/12/2026    ADVANCE CARE PLANNING  05/17/2028    DTAP/TDAP/TD IMMUNIZATION (3 - Td or Tdap) 03/25/2029    HEPATITIS C SCREENING  Completed    PHQ-2 (once per calendar year)  Completed    INFLUENZA VACCINE  Completed    Pneumococcal Vaccine: 65+ Years  Completed    IPV IMMUNIZATION  Aged Out    HPV IMMUNIZATION  Aged Out    MENINGITIS IMMUNIZATION  Aged Out    RSV MONOCLONAL ANTIBODY  Aged Out         Review of Systems  Constitutional, HEENT, cardiovascular, pulmonary, gi and gu systems are negative, except as otherwise noted.     Objective    Exam  BP 96/64 (BP Location: Left arm, Patient Position: Sitting, Cuff Size: Adult Regular)   Pulse 67   Temp 97.6  F (36.4  C) (Oral)   Resp 18   Ht 1.81 m (5' 11.25\")   Wt 107.3 kg (236 lb 9.6 oz)   SpO2 95%   BMI 32.77 kg/m     Estimated body mass index is 32.77 kg/m  as calculated from the following:    Height as of this encounter: 1.81 m (5' 11.25\").    Weight as of this encounter: 107.3 kg (236 lb 9.6 " oz).    Physical Exam  GENERAL: alert and no distress  EYES: Eyes grossly normal to inspection, PERRL and conjunctivae and sclerae normal  HENT: ear canals and TM's normal, nose and mouth without ulcers or lesions  NECK: no adenopathy, no asymmetry, masses, or scars  RESP: lungs clear to auscultation - no rales, rhonchi or wheezes  CV: regular rate and rhythm, normal S1 S2, no S3 or S4, no murmur, click or rub, no peripheral edema  ABDOMEN: soft, nontender, no hepatosplenomegaly, no masses and bowel sounds normal  MS: no gross musculoskeletal defects noted, no edema  SKIN: no suspicious lesions or rashes  NEURO: Normal strength and tone, mentation intact and speech normal  PSYCH: mentation appears normal, affect normal/bright        5/21/2024   Mini Cog   Clock Draw Score 2 Normal   3 Item Recall 2 objects recalled   Mini Cog Total Score 4        Signed Electronically by: Goran Mckeon NP

## 2024-05-21 NOTE — PATIENT INSTRUCTIONS
"Consider trying over-the-counter omeprazole for 3 months to see if some reflux could be causing that making cough when you lay down.    If the cough worsens or other symptoms develop, we certainly could get that chest x-ray like we talked about.  Just send me a message and I can get that ordered.    If the orthopedic agrees with that, those lubricating injections in the knee may be worthwhile.  I have had several patients find benefit from them    If interested in the RSV vaccine, you will want to get that at the pharmacy if you have a drug prescription plan.    You are correct and that the metoprolol is supposed to be 1/2 tablet twice daily.  I will make sure this is corrected at your pharmacy.      Preventive Care Advice   This is general advice we often give to help people stay healthy. Your care team may have specific advice just for you. Please talk to your care team about your own preventive care needs.  Lifestyle  Exercise at least 150 minutes each week (30 minutes a day, 5 days a week).  Do muscle strengthening activities 2 days a week. These help control your weight and prevent disease.  No smoking.  Wear sunscreen to prevent skin cancer.  Have your home tested for radon every 2 to 5 years. Radon is a colorless, odorless gas that can harm your lungs. To learn more, go to www.health.[email or web address].mn. and search for \"Radon in Homes.\"  Keep guns unloaded and locked up in a safe place like a safe or gun vault, or, use a gun lock and hide the keys. Always lock away bullets separately. To learn more, visit [employer].[place].gov and search for \"safe gun storage.\"  Nutrition  Eat 5 or more servings of fruits and vegetables each day.  Try wheat bread, brown rice and whole grain pasta (instead of white bread, rice, and pasta).  Get enough calcium and vitamin D. Check the label on foods and aim for 100% of the RDA (recommended daily allowance).  Regular exams  Have a dental exam and cleaning every 6 months.  See your health care team " every year to talk about:  Any changes in your health.  Any medicines your care team has prescribed.  Preventive care, family planning, and ways to prevent chronic diseases.  Shots (vaccines)   HPV shots (up to age 26), if you've never had them before.  Hepatitis B shots (up to age 59), if you've never had them before.  COVID-19 shot: Get this shot when it's due.  Flu shot: Get a flu shot every year.  Tetanus shot: Get a tetanus shot every 10 years.  Pneumococcal, hepatitis A, and RSV shots: Ask your care team if you need these based on your risk.  Shingles shot (for age 50 and up).  General health tests  Diabetes screening:  Starting at age 35, Get screened for diabetes at least every 3 years.  If you are younger than age 35, ask your care team if you should be screened for diabetes.  Cholesterol test: At age 39, start having a cholesterol test every 5 years, or more often if advised.  Bone density scan (DEXA): At age 50, ask your care team if you should have this scan for osteoporosis (brittle bones).  Hepatitis C: Get tested at least once in your life.  Abdominal aortic aneurysm screening: Talk to your doctor about having this screening if you:  Have ever smoked; and  Are biologically male; and  Are between the ages of 65 and 75.  STIs (sexually transmitted infections)  Before age 24: Ask your care team if you should be screened for STIs.  After age 24: Get screened for STIs if you're at risk. You are at risk for STIs (including HIV) if:  You are sexually active with more than one person.  You don't use condoms every time.  You or a partner was diagnosed with a sexually transmitted infection.  If you are at risk for HIV, ask about PrEP medicine to prevent HIV.  Get tested for HIV at least once in your life, whether you are at risk for HIV or not.  Cancer screening tests  Cervical cancer screening: If you have a cervix, begin getting regular cervical cancer screening tests at age 21. Most people who have regular  screenings with normal results can stop after age 65. Talk about this with your provider.  Breast cancer scan (mammogram): If you've ever had breasts, begin having regular mammograms starting at age 40. This is a scan to check for breast cancer.  Colon cancer screening: It is important to start screening for colon cancer at age 45.  Have a colonoscopy test every 10 years (or more often if you're at risk) Or, ask your provider about stool tests like a FIT test every year or Cologuard test every 3 years.  To learn more about your testing options, visit: www.[employer]/727138.pdf.  For help making a decision, visit: randolph/vs30680.  Prostate cancer screening test: If you have a prostate and are age 55 to 69, ask your provider if you would benefit from a yearly prostate cancer screening test.  Lung cancer screening: If you are a current or former smoker age 50 to 80, ask your care team if ongoing lung cancer screenings are right for you.  For informational purposes only. Not to replace the advice of your health care provider. Copyright   2023 [employer]. All rights reserved. Clinically reviewed by the [facility] Transitions Program. [employer] [id number] - REV 04/24.    Hearing Loss: Care Instructions  Overview     Hearing loss is a sudden or slow decrease in how well you hear. It can range from slight to profound. Permanent hearing loss can occur with aging. It also can happen when you are exposed long-term to loud noise. Examples include listening to loud music, riding motorcycles, or being around other loud machines.  Hearing loss can affect your work and home life. It can make you feel lonely or depressed. You may feel that you have lost your independence. But hearing aids and other devices can help you hear better and feel connected to others.  Follow-up care is a key part of your treatment and safety. Be sure to make and go to all appointments, and call your doctor if you are having problems.  It's also a good idea to know your test results and keep a list of the medicines you take.  How can you care for yourself at home?  Avoid loud noises whenever possible. This helps keep your hearing from getting worse.  Always wear hearing protection around loud noises.  Wear a hearing aid as directed.  A professional can help you pick a hearing aid that will work best for you.  You can also get hearing aids over the counter for mild to moderate hearing loss.  Have hearing tests as your doctor suggests. They can show whether your hearing has changed. Your hearing aid may need to be adjusted.  Use other devices as needed. These may include:  Telephone amplifiers and hearing aids that can connect to a television, stereo, radio, or microphone.  Devices that use lights or vibrations. These alert you to the doorbell, a ringing telephone, or a baby monitor.  Television closed-captioning. This shows the words at the bottom of the screen. Most new TVs can do this.  TTY (text telephone). This lets you type messages back and forth on the telephone instead of talking or listening. These devices are also called TDD. When messages are typed on the keyboard, they are sent over the phone line to a receiving TTY. The message is shown on a monitor.  Use text messaging, social media, and email if it is hard for you to communicate by telephone.  Try to learn a listening technique called speechreading. It is not lipreading. You pay attention to people's gestures, expressions, posture, and tone of voice. These clues can help you understand what a person is saying. Face the person you are talking to, and have them face you. Make sure the lighting is good. You need to see the other person's face clearly.  Think about counseling if you need help to adjust to your hearing loss.  When should you call for help?  Watch closely for changes in your health, and be sure to contact your doctor if:    You think your hearing is getting worse.     You  "have new symptoms, such as dizziness or nausea.   Where can you learn more?  Go to https://www.[employer].net/patiented  Enter R798 in the search box to learn more about \"Hearing Loss: Care Instructions.\"  Current as of: September 27, 2023               Content Version: 14.0    0842-4180 [employer].   Care instructions adapted under license by your healthcare professional. If you have questions about a medical condition or this instruction, always ask your healthcare professional. [employer] disclaims any warranty or liability for your use of this information.      Bladder Training: Care Instructions  Your Care Instructions     Bladder training is used to treat urge incontinence and stress incontinence. Urge incontinence means that the need to urinate comes on so fast that you can't get to a toilet in time. Stress incontinence means that you leak urine because of pressure on your bladder. For example, it may happen when you laugh, cough, or lift something heavy.  Bladder training can increase how long you can wait before you have to urinate. It can also help your bladder hold more urine. And it can give you better control over the urge to urinate.  It is important to remember that bladder training takes a few weeks to a few months to make a difference. You may not see results right away, but don't give up.  Follow-up care is a key part of your treatment and safety. Be sure to make and go to all appointments, and call your doctor if you are having problems. It's also a good idea to know your test results and keep a list of the medicines you take.  How can you care for yourself at home?  Work with your doctor to come up with a bladder training program that is right for you. You may use one or more of the following methods.  Delayed urination  In the beginning, try to keep from urinating for 5 minutes after you first feel the need to go.  While you wait, take deep, slow breaths to relax. " "Kegel exercises can also help you delay the need to go to the bathroom.  After some practice, when you can easily wait 5 minutes to urinate, try to wait 10 minutes before you urinate.  Slowly increase the waiting period until you are able to control when you have to urinate.  Scheduled urination  Empty your bladder when you first wake up in the morning.  Schedule times throughout the day when you will urinate.  Start by going to the bathroom every hour, even if you don't need to go.  Slowly increase the time between trips to the bathroom.  When you have found a schedule that works well for you, keep doing it.  If you wake up during the night and have to urinate, do it. Apply your schedule to waking hours only.  Kegel exercises  These tighten and strengthen pelvic muscles, which can help you control the flow of urine. (If doing these exercises causes pain, stop doing them and talk with your doctor.) To do Kegel exercises:  Squeeze your muscles as if you were trying not to pass gas. Or squeeze your muscles as if you were stopping the flow of urine. Your belly, legs, and buttocks shouldn't move.  Hold the squeeze for 3 seconds, then relax for 5 to 10 seconds.  Start with 3 seconds, then add 1 second each week until you are able to squeeze for 10 seconds.  Repeat the exercise 10 times a session. Do 3 to 8 sessions a day.  When should you call for help?  Watch closely for changes in your health, and be sure to contact your doctor if:    Your incontinence is getting worse.     You do not get better as expected.   Where can you learn more?  Go to https://www.[employer]dINK.net/patiented  Enter V684 in the search box to learn more about \"Bladder Training: Care Instructions.\"  Current as of: November 15, 2023               Content Version: 14.0    2999-9819 [employer].   Care instructions adapted under license by your healthcare professional. If you have questions about a medical condition or this instruction, " always ask your healthcare professional. [employer] disclaims any warranty or liability for your use of this information.

## 2024-05-22 LAB
ALBUMIN SERPL BCG-MCNC: 4 G/DL (ref 3.5–5.2)
ALP SERPL-CCNC: 111 U/L (ref 40–150)
ALT SERPL W P-5'-P-CCNC: 22 U/L (ref 0–70)
ANION GAP SERPL CALCULATED.3IONS-SCNC: 9 MMOL/L (ref 7–15)
AST SERPL W P-5'-P-CCNC: 27 U/L (ref 0–45)
BILIRUB SERPL-MCNC: 0.3 MG/DL
BUN SERPL-MCNC: 23.8 MG/DL (ref 8–23)
CALCIUM SERPL-MCNC: 8.8 MG/DL (ref 8.8–10.2)
CHLORIDE SERPL-SCNC: 107 MMOL/L (ref 98–107)
CHOLEST SERPL-MCNC: 130 MG/DL
CREAT SERPL-MCNC: 1.09 MG/DL (ref 0.67–1.17)
DEPRECATED HCO3 PLAS-SCNC: 25 MMOL/L (ref 22–29)
EGFRCR SERPLBLD CKD-EPI 2021: 70 ML/MIN/1.73M2
FASTING STATUS PATIENT QL REPORTED: ABNORMAL
FASTING STATUS PATIENT QL REPORTED: ABNORMAL
GLUCOSE SERPL-MCNC: 115 MG/DL (ref 70–99)
HDLC SERPL-MCNC: 37 MG/DL
LDLC SERPL CALC-MCNC: 73 MG/DL
NONHDLC SERPL-MCNC: 93 MG/DL
POTASSIUM SERPL-SCNC: 4.6 MMOL/L (ref 3.4–5.3)
PROT SERPL-MCNC: 7 G/DL (ref 6.4–8.3)
SODIUM SERPL-SCNC: 141 MMOL/L (ref 135–145)
TRIGL SERPL-MCNC: 102 MG/DL

## 2024-10-15 ENCOUNTER — APPOINTMENT (OUTPATIENT)
Dept: CT IMAGING | Facility: CLINIC | Age: 79
End: 2024-10-15
Attending: FAMILY MEDICINE
Payer: COMMERCIAL

## 2024-10-15 ENCOUNTER — HOSPITAL ENCOUNTER (EMERGENCY)
Facility: CLINIC | Age: 79
Discharge: HOME | End: 2024-10-15
Attending: FAMILY MEDICINE | Admitting: FAMILY MEDICINE
Payer: COMMERCIAL

## 2024-10-15 VITALS
OXYGEN SATURATION: 97 % | DIASTOLIC BLOOD PRESSURE: 71 MMHG | SYSTOLIC BLOOD PRESSURE: 146 MMHG | TEMPERATURE: 97.4 F | WEIGHT: 225 LBS | HEART RATE: 64 BPM | BODY MASS INDEX: 31.16 KG/M2 | RESPIRATION RATE: 18 BRPM

## 2024-10-15 DIAGNOSIS — N20.1 LEFT URETERAL STONE: ICD-10-CM

## 2024-10-15 LAB
ALBUMIN UR-MCNC: 30 MG/DL
ANION GAP SERPL CALCULATED.3IONS-SCNC: 12 MMOL/L (ref 7–15)
APPEARANCE UR: ABNORMAL
BACTERIA #/AREA URNS HPF: ABNORMAL /HPF
BASOPHILS # BLD AUTO: 0 10E3/UL (ref 0–0.2)
BASOPHILS NFR BLD AUTO: 1 %
BILIRUB UR QL STRIP: NEGATIVE
BUN SERPL-MCNC: 16.4 MG/DL (ref 8–23)
CALCIUM SERPL-MCNC: 8.8 MG/DL (ref 8.8–10.4)
CHLORIDE SERPL-SCNC: 104 MMOL/L (ref 98–107)
COLOR UR AUTO: YELLOW
CREAT SERPL-MCNC: 1.02 MG/DL (ref 0.67–1.17)
EGFRCR SERPLBLD CKD-EPI 2021: 76 ML/MIN/1.73M2
EOSINOPHIL # BLD AUTO: 0.1 10E3/UL (ref 0–0.7)
EOSINOPHIL NFR BLD AUTO: 1 %
ERYTHROCYTE [DISTWIDTH] IN BLOOD BY AUTOMATED COUNT: 12.7 % (ref 10–15)
GLUCOSE SERPL-MCNC: 155 MG/DL (ref 70–99)
GLUCOSE UR STRIP-MCNC: NEGATIVE MG/DL
HCO3 SERPL-SCNC: 22 MMOL/L (ref 22–29)
HCT VFR BLD AUTO: 41.8 % (ref 40–53)
HGB BLD-MCNC: 14.1 G/DL (ref 13.3–17.7)
HGB UR QL STRIP: ABNORMAL
IMM GRANULOCYTES # BLD: 0 10E3/UL
IMM GRANULOCYTES NFR BLD: 0 %
KETONES UR STRIP-MCNC: NEGATIVE MG/DL
LEUKOCYTE ESTERASE UR QL STRIP: NEGATIVE
LYMPHOCYTES # BLD AUTO: 1 10E3/UL (ref 0.8–5.3)
LYMPHOCYTES NFR BLD AUTO: 13 %
MCH RBC QN AUTO: 31.1 PG (ref 26.5–33)
MCHC RBC AUTO-ENTMCNC: 33.7 G/DL (ref 31.5–36.5)
MCV RBC AUTO: 92 FL (ref 78–100)
MONOCYTES # BLD AUTO: 0.5 10E3/UL (ref 0–1.3)
MONOCYTES NFR BLD AUTO: 7 %
MUCOUS THREADS #/AREA URNS LPF: PRESENT /LPF
NEUTROPHILS # BLD AUTO: 5.8 10E3/UL (ref 1.6–8.3)
NEUTROPHILS NFR BLD AUTO: 78 %
NITRATE UR QL: NEGATIVE
NRBC # BLD AUTO: 0 10E3/UL
NRBC BLD AUTO-RTO: 0 /100
PH UR STRIP: 5 [PH] (ref 5–7)
PLATELET # BLD AUTO: 243 10E3/UL (ref 150–450)
POTASSIUM SERPL-SCNC: 4.1 MMOL/L (ref 3.4–5.3)
RBC # BLD AUTO: 4.53 10E6/UL (ref 4.4–5.9)
RBC URINE: >182 /HPF
SODIUM SERPL-SCNC: 138 MMOL/L (ref 135–145)
SP GR UR STRIP: 1.03 (ref 1–1.03)
SQUAMOUS EPITHELIAL: <1 /HPF
UROBILINOGEN UR STRIP-MCNC: <2 MG/DL
WBC # BLD AUTO: 7.5 10E3/UL (ref 4–11)
WBC URINE: 0 /HPF

## 2024-10-15 PROCEDURE — 36415 COLL VENOUS BLD VENIPUNCTURE: CPT | Performed by: FAMILY MEDICINE

## 2024-10-15 PROCEDURE — 85004 AUTOMATED DIFF WBC COUNT: CPT | Performed by: FAMILY MEDICINE

## 2024-10-15 PROCEDURE — 250N000011 HC RX IP 250 OP 636: Performed by: FAMILY MEDICINE

## 2024-10-15 PROCEDURE — 80048 BASIC METABOLIC PNL TOTAL CA: CPT | Performed by: FAMILY MEDICINE

## 2024-10-15 PROCEDURE — 99285 EMERGENCY DEPT VISIT HI MDM: CPT | Mod: 25

## 2024-10-15 PROCEDURE — 96375 TX/PRO/DX INJ NEW DRUG ADDON: CPT

## 2024-10-15 PROCEDURE — 81001 URINALYSIS AUTO W/SCOPE: CPT | Performed by: FAMILY MEDICINE

## 2024-10-15 PROCEDURE — 96374 THER/PROPH/DIAG INJ IV PUSH: CPT

## 2024-10-15 PROCEDURE — 74176 CT ABD & PELVIS W/O CONTRAST: CPT

## 2024-10-15 RX ORDER — ONDANSETRON 2 MG/ML
4 INJECTION INTRAMUSCULAR; INTRAVENOUS ONCE
Status: COMPLETED | OUTPATIENT
Start: 2024-10-15 | End: 2024-10-15

## 2024-10-15 RX ORDER — ONDANSETRON 4 MG/1
4 TABLET, ORALLY DISINTEGRATING ORAL EVERY 8 HOURS PRN
Qty: 12 TABLET | Refills: 0 | Status: SHIPPED | OUTPATIENT
Start: 2024-10-15 | End: 2024-10-18

## 2024-10-15 RX ORDER — DIMENHYDRINATE 50 MG
50 TABLET ORAL EVERY 6 HOURS PRN
Qty: 28 TABLET | Refills: 0 | Status: SHIPPED | OUTPATIENT
Start: 2024-10-15 | End: 2024-10-22

## 2024-10-15 RX ORDER — OXYCODONE HYDROCHLORIDE 5 MG/1
5 TABLET ORAL EVERY 4 HOURS PRN
Qty: 12 TABLET | Refills: 0 | Status: SHIPPED | OUTPATIENT
Start: 2024-10-15 | End: 2024-10-19

## 2024-10-15 RX ORDER — KETOROLAC TROMETHAMINE 15 MG/ML
15 INJECTION, SOLUTION INTRAMUSCULAR; INTRAVENOUS ONCE
Status: COMPLETED | OUTPATIENT
Start: 2024-10-15 | End: 2024-10-15

## 2024-10-15 RX ORDER — IBUPROFEN 200 MG
400 TABLET ORAL EVERY 6 HOURS
Qty: 56 TABLET | Refills: 0 | Status: SHIPPED | OUTPATIENT
Start: 2024-10-15 | End: 2024-10-22

## 2024-10-15 RX ORDER — ACETAMINOPHEN 500 MG
1000 TABLET ORAL EVERY 6 HOURS
Qty: 56 TABLET | Refills: 0 | Status: SHIPPED | OUTPATIENT
Start: 2024-10-15 | End: 2024-10-22

## 2024-10-15 RX ADMIN — ONDANSETRON 4 MG: 2 INJECTION INTRAMUSCULAR; INTRAVENOUS at 08:06

## 2024-10-15 RX ADMIN — KETOROLAC TROMETHAMINE 15 MG: 15 INJECTION, SOLUTION INTRAMUSCULAR; INTRAVENOUS at 08:07

## 2024-10-15 ASSESSMENT — COLUMBIA-SUICIDE SEVERITY RATING SCALE - C-SSRS
1. IN THE PAST MONTH, HAVE YOU WISHED YOU WERE DEAD OR WISHED YOU COULD GO TO SLEEP AND NOT WAKE UP?: NO
6. HAVE YOU EVER DONE ANYTHING, STARTED TO DO ANYTHING, OR PREPARED TO DO ANYTHING TO END YOUR LIFE?: NO
2. HAVE YOU ACTUALLY HAD ANY THOUGHTS OF KILLING YOURSELF IN THE PAST MONTH?: NO

## 2024-10-15 ASSESSMENT — ACTIVITIES OF DAILY LIVING (ADL)
ADLS_ACUITY_SCORE: 35
ADLS_ACUITY_SCORE: 35

## 2024-10-15 NOTE — DISCHARGE INSTRUCTIONS
Take Tylenol and ibuprofen alternating every 3 hours for baseline pain management    Take Zofran as needed for nausea or vomit    Take oxycodone as needed for pain not controlled with Tylenol and    [facility] stone clinic will call you to schedule follow-up appointment

## 2024-10-15 NOTE — ED NOTES
Patient verbalized understanding of discharge instructions including medication administration and recommended follow up care as noted on discharge instructions.  Written discharge instructions given, denies any further questions.  Prescriptions: were electronically sent, patient and spouse aware.  Barriers to learning identified and addressed:  None observed. Patient provided with urine strainer and specimen cup. Patient educated on straining urine, and bringing stone into the appointment.

## 2024-10-15 NOTE — ED PROVIDER NOTES
EMERGENCY DEPARTMENT ENCOUNTER      NAME: Daniel Jacobs  AGE: 79 year old male  YOB: 1945  MRN: [id number]  EVALUATION DATE & TIME: No admission date for patient encounter.    PCP: Goran Mckeon    ED PROVIDER: Ken Santiago M.D.    Chief Complaint   Patient presents with    Abdominal Pain       FINAL IMPRESSION:  1. Left ureteral stone        ED COURSE & MEDICAL DECISION MAKING:    Pertinent Labs & Imaging studies independently interpreted by me. (See chart for details)  Reviewed most recent primary care visit from May 2024 which was a Medicare wellness visit which also addressed back pain the patient was having, comprehensive panel done at that time with creatinine 1.09 normal hepatic panel, normal CBC    ED Course as of 10/15/24 0932   Tue Oct 15, 2024   0753 Patient seen and examined, presents today with left lower quadrant pain that started about 4 hours prior to coming to the emergency department.  No flank pain, no testicular pain, no dysuria or hematuria.  No diarrhea.  On initial exam here, patient is in the bathroom vomiting, no CVA tenderness, no left lower quadrant tenderness.  Patient says this feels somewhat like kidney stones and certainly given abrupt onset and location of pain, he likely has a distal ureteral stone.  Cannot exclude diverticulitis or bowel obstruction although clinically these are less likely.  Labs are ordered along with CT scan of the abdomen and pelvis, Toradol, Zofran.   0830 Labs ordered and independently interpreted by me with normal CBC, basic panel with hyperglycemia but no other acute findings   0836 CT scan of the abdomen pelvis independently interpreted by me with a couple of small liver lesions, likely hemangiomas, stones in the renal pelvises bilaterally, mild left hydronephrosis and hydroureter with a 5-6 mm distal ureteral stone about a centimeter from the bladder   0857 Patient rechecked, feeling better and stable for discharge after urinalysis    0926 Urinalysis independently interpreted by me negative for evidence for infection         At the conclusion of the encounter I discussed the results of all of the tests and the disposition. The questions were answered. The patient or family acknowledged understanding and was agreeable with the care plan.     Medical Decision Making  Obtained supplemental history:Supplemental history obtained?: Documented in chart and Family Member/Significant Other  Reviewed external records: External records reviewed?: No  Care impacted by chronic illness:Heart Disease  Did you consider but not order tests?: Work up considered but not performed and documented in chart, if applicable  Did you interpret images independently?: Independent interpretation of ECG and images noted in documentation, when applicable.  Consultation discussion with other provider:Did you involve another provider (consultant, , pharmacy, etc.)?: No  Discharge. I prescribed additional prescription strength medication(s) as charted. I considered admission, but discharged patient after significant clinical improvement.    MIPS: Not Applicable      MEDICATIONS GIVEN IN THE EMERGENCY:  Medications   ondansetron (ZOFRAN) injection 4 mg (4 mg Intravenous $Given 10/15/24 0806)   ketorolac (TORADOL) injection 15 mg (15 mg Intravenous $Given 10/15/24 0807)       NEW PRESCRIPTIONS STARTED AT TODAY'S ER VISIT  New Prescriptions    ACETAMINOPHEN (TYLENOL) 500 MG TABLET    Take 2 tablets (1,000 mg) by mouth every 6 hours for 7 days.    DIMENHYDRINATE (DRAMAMINE) 50 MG TABLET    Take 1 tablet (50 mg) by mouth every 6 hours as needed for other (kidney stone pain management).    IBUPROFEN (ADVIL/MOTRIN) 200 MG TABLET    Take 2 tablets (400 mg) by mouth every 6 hours for 7 days.    ONDANSETRON (ZOFRAN ODT) 4 MG ODT TAB    Take 1 tablet (4 mg) by mouth every 8 hours as needed for nausea.    OXYCODONE (ROXICODONE) 5 MG TABLET    Take 1 tablet (5 mg) by mouth every 4 hours  as needed for severe pain. If pain is not improved with acetaminophen and ibuprofen.       =================================================================    HPI    Patient information was obtained from: patient and patient's wife      Daniel Jacobs is a 79 year old male with a pertinent history of nephrolithiasis, peripheral vascular disease, CAD who presents to this ED walking for evaluation of abdominal pain.     Patient came to ED for abdominal pain and nausea that started 4 hours ago. Patient reports getting up to go to the bathroom this morning when he started to not feel right. After releasing some gas pain started in his left lower abdomen. He feels like it is a kidney stone. Patient is allergic to codeine and amoxicillin and takes medications for blood pressure and cholesterol. Patient denies hematuria, dysuria, back pain or any other medical problems.      REVIEW OF SYSTEMS   Review of Systems   All other systems reviewed and negative    PAST MEDICAL HISTORY:  Past Medical History:   Diagnosis Date    Cardiac abnormality     Low back pain     Prostate cancer (H) 2007    Superficial thrombosis of right lower extremity 05/17/2023       PAST SURGICAL HISTORY:  Past Surgical History:   Procedure Laterality Date    CARPAL TUNNEL RELEASE RT/LT Left     HERNIA REPAIR      INSERT / REPLACE / REMOVE PROSTHESIS URETHRAL SPHINCTER N/A 06/23/2014    Procedure: ARTIFICIAL URINARY SPHINCTER INSERTION;  Surgeon: Torres Sanabria MD;  Location: [facility];  Service:     INSERT / REPLACE / REMOVE PROSTHESIS URETHRAL SPHINCTER N/A 02/16/2015    Procedure: REMOVAL/REPLACEMENT OF ARTIFICIAL URINARY SPHINCTER COMPONENT;  Surgeon: Torres Sanabria MD;  Location: [facility];  Service:     LUMBAR SPINE SURGERY  01/01/1981    PROSTATECTOMY         CURRENT MEDICATIONS:    No current facility-administered medications for this encounter.     Current Outpatient Medications   Medication Sig Dispense Refill     acetaminophen (TYLENOL) 500 MG tablet Take 2 tablets (1,000 mg) by mouth every 6 hours for 7 days. 56 tablet 0    dimenhyDRINATE (DRAMAMINE) 50 MG tablet Take 1 tablet (50 mg) by mouth every 6 hours as needed for other (kidney stone pain management). 28 tablet 0    ibuprofen (ADVIL/MOTRIN) 200 MG tablet Take 2 tablets (400 mg) by mouth every 6 hours for 7 days. 56 tablet 0    ondansetron (ZOFRAN ODT) 4 MG ODT tab Take 1 tablet (4 mg) by mouth every 8 hours as needed for nausea. 12 tablet 0    oxyCODONE (ROXICODONE) 5 MG tablet Take 1 tablet (5 mg) by mouth every 4 hours as needed for severe pain. If pain is not improved with acetaminophen and ibuprofen. 12 tablet 0    aspirin 81 MG EC tablet Take 1 tablet (81 mg) by mouth daily      atorvastatin (LIPITOR) 80 MG tablet Take 1 tablet (80 mg) by mouth daily 90 tablet 3    ketoconazole (NIZORAL) 2 % external cream Apply topically as needed      LUTEIN EXTRACT-ZEAXANTHIN EXT ORAL [LUTEIN EXTRACT-ZEAXANTHIN EXT ORAL] Take 1 tablet by mouth daily.      metoprolol tartrate (LOPRESSOR) 25 MG tablet Take 0.5 tablets (12.5 mg) by mouth 2 times daily 90 tablet 3    nitroGLYcerin (NITROSTAT) 0.4 MG sublingual tablet Place 1 tablet (0.4 mg) under the tongue every 5 minutes as needed for chest pain For chest pain place 1 tablet under the tongue every 5 minutes for 3 doses. If symptoms persist 5 minutes after 1st dose call 911. 25 tablet 0    vitamin C (ASCORBIC ACID) 1000 MG TABS Take 1,000 mg by mouth daily         ALLERGIES:  Allergies   Allergen Reactions    Amoxicillin-Pot Clavulanate Anaphylaxis    Benzocaine Unknown     Other reaction(s): Throat Swelling/Closing, Throat swelling    Guaifenesin-Codeine Anaphylaxis     Has tolerated vicodin and percocet w/o issue.       FAMILY HISTORY:  Family History   Problem Relation Age of Onset    Prostate Cancer Brother        SOCIAL HISTORY:   Social History     Socioeconomic History    Marital status:    Tobacco Use    Smoking  status: Never    Smokeless tobacco: Never   Vaping Use    Vaping status: Never Used   Substance and Sexual Activity    Alcohol use: No    Drug use: No     Social Determinants of Health     Financial Resource Strain: Low Risk  (5/14/2024)    Financial Resource Strain     Within the past 12 months, have you or your family members you live with been unable to get utilities (heat, electricity) when it was really needed?: No   Food Insecurity: Low Risk  (5/14/2024)    Food Insecurity     Within the past 12 months, did you worry that your food would run out before you got money to buy more?: No     Within the past 12 months, did the food you bought just not last and you didn t have money to get more?: No   Transportation Needs: Low Risk  (5/14/2024)    Transportation Needs     Within the past 12 months, has lack of transportation kept you from medical appointments, getting your medicines, non-medical meetings or appointments, work, or from getting things that you need?: No   Physical Activity: Insufficiently Active (5/14/2024)    Exercise Vital Sign     Days of Exercise per Week: 3 days     Minutes of Exercise per Session: 40 min   Stress: No Stress Concern Present (5/14/2024)    [employer] - Occupational Stress Questionnaire     Feeling of Stress : Not at all   Social Connections: Unknown (5/14/2024)    Social Connection and Isolation Panel [NHANES]     Frequency of Social Gatherings with Friends and Family: Three times a week   Interpersonal Safety: Low Risk  (5/21/2024)    Interpersonal Safety     Do you feel physically and emotionally safe where you currently live?: Yes     Within the past 12 months, have you been hit, slapped, kicked or otherwise physically hurt by someone?: No     Within the past 12 months, have you been humiliated or emotionally abused in other ways by your partner or ex-partner?: No   Housing Stability: Low Risk  (5/14/2024)    Housing Stability     Do you have  housing? : Yes     Are you worried about losing your housing?: No       VITALS:  BP (!) 173/77   Pulse 70   Temp 97.4  F (36.3  C) (Oral)   Resp 18   Wt 102.1 kg (225 lb)   SpO2 96%   BMI 31.16 kg/m      PHYSICAL EXAM:  Physical Exam  Vitals and nursing note reviewed.   Constitutional:       Appearance: Normal appearance.   HENT:      Head: Normocephalic and atraumatic.      Right Ear: External ear normal.      Left Ear: External ear normal.      Nose: Nose normal.      Mouth/Throat:      Mouth: Mucous membranes are moist.   Eyes:      Extraocular Movements: Extraocular movements intact.      Conjunctiva/sclera: Conjunctivae normal.      Pupils: Pupils are equal, round, and reactive to light.   Cardiovascular:      Rate and Rhythm: Normal rate and regular rhythm.   Pulmonary:      Effort: Pulmonary effort is normal.      Breath sounds: Normal breath sounds. No wheezing or rales.   Abdominal:      General: Abdomen is flat. There is no distension.      Palpations: Abdomen is soft.      Tenderness: There is no abdominal tenderness. There is no guarding.   Musculoskeletal:         General: Normal range of motion.      Cervical back: Normal range of motion and neck supple.      Right lower leg: No edema.      Left lower leg: No edema.   Lymphadenopathy:      Cervical: No cervical adenopathy.   Skin:     General: Skin is warm and dry.   Neurological:      General: No focal deficit present.      Mental Status: He is alert and oriented to person, place, and time. Mental status is at baseline.      Comments: No gross focal neurologic deficits   Psychiatric:         Mood and Affect: Mood normal.         Behavior: Behavior normal.         Thought Content: Thought content normal.          LAB:  All pertinent labs reviewed and interpreted.  Results for orders placed or performed during the hospital encounter of 10/15/24   Abd/pelvis CT no contrast - Stone Protocol    Impression    IMPRESSION:   1.  Mildly obstructing 6 mm  stone distal left ureter.    2.  Bilateral nonobstructing intrarenal stones as above.     Basic metabolic panel   Result Value Ref Range    Sodium 138 135 - 145 mmol/L    Potassium 4.1 3.4 - 5.3 mmol/L    Chloride 104 98 - 107 mmol/L    Carbon Dioxide (CO2) 22 22 - 29 mmol/L    Anion Gap 12 7 - 15 mmol/L    Urea Nitrogen 16.4 8.0 - 23.0 mg/dL    Creatinine 1.02 0.67 - 1.17 mg/dL    GFR Estimate 76 >60 mL/min/1.73m2    Calcium 8.8 8.8 - 10.4 mg/dL    Glucose 155 (H) 70 - 99 mg/dL   UA with Microscopic reflex to Culture    Specimen: Urine, Midstream   Result Value Ref Range    Color Urine Yellow Colorless, Straw, Light Yellow, Yellow    Appearance Urine Turbid (A) Clear    Glucose Urine Negative Negative mg/dL    Bilirubin Urine Negative Negative    Ketones Urine Negative Negative mg/dL    Specific Gravity Urine 1.027 1.001 - 1.030    Blood Urine >1.0 mg/dL (A) Negative    pH Urine 5.0 5.0 - 7.0    Protein Albumin Urine 30 (A) Negative mg/dL    Urobilinogen Urine <2.0 <2.0 mg/dL    Nitrite Urine Negative Negative    Leukocyte Esterase Urine Negative Negative    Bacteria Urine Few (A) None Seen /HPF    Mucus Urine Present (A) None Seen /LPF    RBC Urine >182 (H) <=2 /HPF    WBC Urine 0 <=5 /HPF    Squamous Epithelials Urine <1 <=1 /HPF   CBC with platelets and differential   Result Value Ref Range    WBC Count 7.5 4.0 - 11.0 10e3/uL    RBC Count 4.53 4.40 - 5.90 10e6/uL    Hemoglobin 14.1 13.3 - 17.7 g/dL    Hematocrit 41.8 40.0 - 53.0 %    MCV 92 78 - 100 fL    MCH 31.1 26.5 - 33.0 pg    MCHC 33.7 31.5 - 36.5 g/dL    RDW 12.7 10.0 - 15.0 %    Platelet Count 243 150 - 450 10e3/uL    % Neutrophils 78 %    % Lymphocytes 13 %    % Monocytes 7 %    % Eosinophils 1 %    % Basophils 1 %    % Immature Granulocytes 0 %    NRBCs per 100 WBC 0 <1 /100    Absolute Neutrophils 5.8 1.6 - 8.3 10e3/uL    Absolute Lymphocytes 1.0 0.8 - 5.3 10e3/uL    Absolute Monocytes 0.5 0.0 - 1.3 10e3/uL    Absolute Eosinophils 0.1 0.0 - 0.7  10e3/uL    Absolute Basophils 0.0 0.0 - 0.2 10e3/uL    Absolute Immature Granulocytes 0.0 <=0.4 10e3/uL    Absolute NRBCs 0.0 10e3/uL       RADIOLOGY:  Reviewed all pertinent imaging. Please see official radiology report.  Abd/pelvis CT no contrast - Stone Protocol   Final Result   IMPRESSION:    1.  Mildly obstructing 6 mm stone distal left ureter.      2.  Bilateral nonobstructing intrarenal stones as above.             I, Katelyn Castellano, am serving as a scribe to document services personally performed by Dr. Santiago based on my observation and the provider's statements to me. I, Ken Santiago MD attest that Katelyn Castellano is acting in a scribe capacity, has observed my performance of the services and has documented them in accordance with my direction.    Ken Santiago M.D.  Emergency Medicine  [facility] EMERGENCY ROOM  [address] [id number] [phone]  Dept: [phone]       Ken Santiago MD  10/15/24 0932

## 2024-10-15 NOTE — ED TRIAGE NOTES
Pt reports LLQ abd pain started at 0400, +NV. Denies urinary symptoms. Pain is 7/10. No meds PTA. HX kidney stones.      Triage Assessment (Adult)       Row Name 10/15/24 0741          Triage Assessment    Airway WDL WDL        Respiratory WDL    Respiratory WDL WDL        Skin Circulation/Temperature WDL    Skin Circulation/Temperature WDL WDL        Cardiac WDL    Cardiac WDL WDL        Peripheral/Neurovascular WDL    Peripheral Neurovascular WDL WDL        Cognitive/Neuro/Behavioral WDL    Cognitive/Neuro/Behavioral WDL WDL

## 2024-10-16 ENCOUNTER — PATIENT OUTREACH (OUTPATIENT)
Dept: FAMILY MEDICINE | Facility: CLINIC | Age: 79
End: 2024-10-16
Payer: COMMERCIAL

## 2024-10-16 NOTE — TELEPHONE ENCOUNTER
Transitions of Care Outreach  No chief complaint on file.      Most Recent Admission Date: 10/15/2024   Most Recent Admission Diagnosis:      Most Recent Discharge Date: 10/15/2024   Most Recent Discharge Diagnosis: Left ureteral stone - N20.1     Transitions of Care Assessment    Discharge Assessment  How are you doing now that you are home?: Hasn't passed the stone yet, medications are making him comfortable.  How are your symptoms? (Red Flag symptoms escalate to triage hotline per guidelines): Improved  Do you know how to contact your clinic care team if you have future questions or changes to your health status? : Yes  Does the patient have their discharge instructions? : Yes  Does the patient have questions regarding their discharge instructions? : No  Were you started on any new medications or were there changes to any of your previous medications? : Yes  Does the patient have all of their medications?: Yes  Do you have questions regarding any of your medications? : No  Do you have all of your needed medical supplies or equipment (DME)?  (i.e. oxygen tank, CPAP, cane, etc.): Yes    Follow up Plan     Discharge Follow-Up  Discharge follow up appointment scheduled in alignment with recommended follow up timeframe or Transitions of Risk Category? (Low = within 30 days; Moderate= within 14 days; High= within 7 days): Yes  Discharge Follow Up Appointment Date: 10/22/24  Discharge Follow Up Appointment Scheduled with?: Specialty Care Provider    Future Appointments   Date Time Provider Department Center   10/22/2024  9:30 AM Torrie Ness APRN CNP [facility]   10/31/2024 11:00 AM Goran Mckeon NP [facility]       Outpatient Plan as outlined on AVS reviewed with patient.    For any urgent concerns, please contact our 24 hour nurse triage line: [phone] ([phone])       Matthias Garcia RN

## 2024-10-17 ENCOUNTER — TELEPHONE (OUTPATIENT)
Dept: UROLOGY | Facility: CLINIC | Age: 79
End: 2024-10-17
Payer: COMMERCIAL

## 2024-10-17 NOTE — TELEPHONE ENCOUNTER
[facility] Call Center    Phone Message    May a detailed message be left on voicemail: yes     Reason for Call: Other: pt calling has questions regarding kidney stone and when it may passed and what are options for him, please reach out to pt     Action Taken: Other: [facility]    Travel Screening: Not Applicable     Date of Service:

## 2024-10-18 ENCOUNTER — VIRTUAL VISIT (OUTPATIENT)
Dept: UROLOGY | Facility: CLINIC | Age: 79
End: 2024-10-18
Attending: FAMILY MEDICINE
Payer: COMMERCIAL

## 2024-10-18 DIAGNOSIS — N20.1 LEFT URETERAL STONE: Primary | ICD-10-CM

## 2024-10-18 PROCEDURE — 99204 OFFICE O/P NEW MOD 45 MIN: CPT | Mod: 95 | Performed by: NURSE PRACTITIONER

## 2024-10-18 RX ORDER — OXYCODONE HYDROCHLORIDE 5 MG/1
5 TABLET ORAL EVERY 6 HOURS PRN
Qty: 12 TABLET | Refills: 0 | Status: SHIPPED | OUTPATIENT
Start: 2024-10-18 | End: 2024-10-21

## 2024-10-18 RX ORDER — TAMSULOSIN HYDROCHLORIDE 0.4 MG/1
0.4 CAPSULE ORAL DAILY
Qty: 30 CAPSULE | Refills: 0 | Status: SHIPPED | OUTPATIENT
Start: 2024-10-18 | End: 2024-11-02

## 2024-10-18 RX ORDER — DIMENHYDRINATE 50 MG
50 TABLET ORAL
Qty: 30 TABLET | Refills: 0 | Status: SHIPPED | OUTPATIENT
Start: 2024-10-18 | End: 2024-11-02

## 2024-10-18 NOTE — PROGRESS NOTES
Urology Video Office Visit    Video-Visit Details    Type of service:  Video Visit    Video Start Time: 1329    Video End Time: 1355    Originating Location (pt. Location): Home    Distant Location (provider location):  On-site     Platform used for Video Visit: [employer]           Assessment and Plan:     Assessment:79 year old male with a left 6mm distal ureteral stone.     Plan:  -Reviewed CT scan with patient. Noted left distal ureteral stone with multiple left nonobstructing renal stones.   -The patient and I discussed the diagnosis and natural history of urolithiasis.  Discussed future visit for stone prevention. Would recommend stopping Vitamin C supplement.   -We discussed treatment options including observation with MET x 3-4 weeks vs ureteroscopy and laser lithotripsy. I counseled the patient regarding the potential need for a ureteral stent after treatment and the necessity of removing the stent after surgery. After discussing risks and benefits of a ureteroscopy the patient elects to proceed with left URS/LL.  -Please use acetaminophen, ibuprofen, dimenhydrinate, and oxycodone PRN for pain control. Please start on tamsulosin 0.4mg PO daily to help with stone passage.   -If having severe flank pain, fevers, chills, nausea, or vomiting please notify Urology clinic or be seen in the ER.        Torrie Ness CNP  Department of Urology  October 18, 2024    I spent a total of 35 minutes spent on the date of the encounter doing chart review, history and exam, documentation, and further activities as noted above.          Chief Complaint:   Left Ureteral Stone         History of Present Illness:    Daniel Jacobs is a pleasant 79 year old male who presents with concerns of a left ureteral stone    Mr. Jacobs was seen in the ER on 10/15/24 for LLQ pain with nausea.     CT scan on 10/15/24 (images personally reviewed) revealed a left 6mm distal ureteral stone with hydronephrosis. Noted several left  nonobstructing renal stones. Noted right nonobstructing renal stones without hydronephrosis.     Pain is well controlled at his time with use of acetaminophen and Advil. He is using oxycodone PRN. Denies any f/c. Positive for nausea with occasional bouts of emesis. Is able to tolerate fluids but not low appetite. Denies any gross hematuria or dysuria.     He has 2 prior stone episode which he has been able to pass spontaneously.     No known family history of stones.     Stone Risk Factors: Vitamin C use    Likes to drink orange juice     History of prostate CA with AUS in 2014.         Past Medical History:     Past Medical History:   Diagnosis Date    Cardiac abnormality     Low back pain     Prostate cancer (H) 2007    Superficial thrombosis of right lower extremity 05/17/2023            Past Surgical History:     Past Surgical History:   Procedure Laterality Date    CARPAL TUNNEL RELEASE RT/LT Left     HERNIA REPAIR      INSERT / REPLACE / REMOVE PROSTHESIS URETHRAL SPHINCTER N/A 06/23/2014    Procedure: ARTIFICIAL URINARY SPHINCTER INSERTION;  Surgeon: Torres Sanabria MD;  Location: [facility];  Service:     INSERT / REPLACE / REMOVE PROSTHESIS URETHRAL SPHINCTER N/A 02/16/2015    Procedure: REMOVAL/REPLACEMENT OF ARTIFICIAL URINARY SPHINCTER COMPONENT;  Surgeon: Torres Sanabria MD;  Location: [facility];  Service:     LUMBAR SPINE SURGERY  01/01/1981    PROSTATECTOMY              Medications     Current Outpatient Medications   Medication Sig Dispense Refill    acetaminophen (TYLENOL) 500 MG tablet Take 2 tablets (1,000 mg) by mouth every 6 hours for 7 days. 56 tablet 0    aspirin 81 MG EC tablet Take 1 tablet (81 mg) by mouth daily      atorvastatin (LIPITOR) 80 MG tablet Take 1 tablet (80 mg) by mouth daily 90 tablet 3    dimenhyDRINATE (DRAMAMINE) 50 MG tablet Take 1 tablet (50 mg) by mouth every 6 hours as needed for other (kidney stone pain management). 28 tablet 0    ibuprofen  (ADVIL/MOTRIN) 200 MG tablet Take 2 tablets (400 mg) by mouth every 6 hours for 7 days. 56 tablet 0    ketoconazole (NIZORAL) 2 % external cream Apply topically as needed      LUTEIN EXTRACT-ZEAXANTHIN EXT ORAL [LUTEIN EXTRACT-ZEAXANTHIN EXT ORAL] Take 1 tablet by mouth daily.      metoprolol tartrate (LOPRESSOR) 25 MG tablet Take 0.5 tablets (12.5 mg) by mouth 2 times daily 90 tablet 3    nitroGLYcerin (NITROSTAT) 0.4 MG sublingual tablet Place 1 tablet (0.4 mg) under the tongue every 5 minutes as needed for chest pain For chest pain place 1 tablet under the tongue every 5 minutes for 3 doses. If symptoms persist 5 minutes after 1st dose call 911. 25 tablet 0    ondansetron (ZOFRAN ODT) 4 MG ODT tab Take 1 tablet (4 mg) by mouth every 8 hours as needed for nausea. 12 tablet 0    oxyCODONE (ROXICODONE) 5 MG tablet Take 1 tablet (5 mg) by mouth every 4 hours as needed for severe pain. If pain is not improved with acetaminophen and ibuprofen. 12 tablet 0    vitamin C (ASCORBIC ACID) 1000 MG TABS Take 1,000 mg by mouth daily       No current facility-administered medications for this visit.            Family History:     Family History   Problem Relation Age of Onset    Prostate Cancer Brother             Social History:     Social History     Socioeconomic History    Marital status:      Spouse name: Not on file    Number of children: Not on file    Years of education: Not on file    Highest education level: Not on file   Occupational History    Not on file   Tobacco Use    Smoking status: Never    Smokeless tobacco: Never   Vaping Use    Vaping status: Never Used   Substance and Sexual Activity    Alcohol use: No    Drug use: No    Sexual activity: Not on file   Other Topics Concern    Not on file   Social History Narrative    Not on file     Social Determinants of Health     Financial Resource Strain: Low Risk  (5/14/2024)    Financial Resource Strain     Within the past 12 months, have you or your family  members you live with been unable to get utilities (heat, electricity) when it was really needed?: No   Food Insecurity: Low Risk  (5/14/2024)    Food Insecurity     Within the past 12 months, did you worry that your food would run out before you got money to buy more?: No     Within the past 12 months, did the food you bought just not last and you didn t have money to get more?: No   Transportation Needs: Low Risk  (5/14/2024)    Transportation Needs     Within the past 12 months, has lack of transportation kept you from medical appointments, getting your medicines, non-medical meetings or appointments, work, or from getting things that you need?: No   Physical Activity: Insufficiently Active (5/14/2024)    Exercise Vital Sign     Days of Exercise per Week: 3 days     Minutes of Exercise per Session: 40 min   Stress: No Stress Concern Present (5/14/2024)    [employer] [place] [employer] - Occupational Stress Questionnaire     Feeling of Stress : Not at all   Social Connections: Unknown (5/14/2024)    Social Connection and Isolation Panel [NHANES]     Frequency of Communication with Friends and Family: Not on file     Frequency of Social Gatherings with Friends and Family: Three times a week     Attends Temple Services: Not on file     Active Member of Clubs or Organizations: Not on file     Attends Club or Organization Meetings: Not on file     Marital Status: Not on file   Interpersonal Safety: Low Risk  (5/21/2024)    Interpersonal Safety     Do you feel physically and emotionally safe where you currently live?: Yes     Within the past 12 months, have you been hit, slapped, kicked or otherwise physically hurt by someone?: No     Within the past 12 months, have you been humiliated or emotionally abused in other ways by your partner or ex-partner?: No   Housing Stability: Low Risk  (5/14/2024)    Housing Stability     Do you have housing? : Yes     Are you worried about losing your housing?: No             Allergies:   Amoxicillin-pot clavulanate, Benzocaine, and Guaifenesin-codeine         Review of Systems:  From intake questionnaire   Negative 14 system review except as noted on HPI, nurse's note.         Physical Exam:   General Appearance: Well groomed, hygenic  Eyes: No redness, discharge  Respiratory: No cough, no respiratory distress or labored breathing  Musculoskeletal: Grossly normal, full range of motion in upper extremities, no gross deficits  Skin: No discoloration or apparent rashes  Neurologic - No tremors  Psychiatric - Alert and oriented  The rest of a comprehensive physical examination is deferred due to video visit restrictions        Labs:    I personally reviewed all applicable laboratory data and went over findings with patient  Significant for:    CBC RESULTS:  Recent Labs   Lab Test 10/15/24  0805 05/21/24  1454   WBC 7.5 4.9   HGB 14.1 13.9    239        BMP RESULTS:  Recent Labs   Lab Test 10/15/24  0805 05/21/24  1454 05/17/23  1038 01/21/22  0806 12/22/20  1154 03/25/19  1140    141 141 139 143 141   POTASSIUM 4.1 4.6 4.4 4.6 5.2* 4.5   CHLORIDE 104 107 107 106 108* 107   CO2 22 25 22 23 28 24   ANIONGAP 12 9 12 10 7 10   * 115* 101* 101 101 99   BUN 16.4 23.8* 16.2 17 17 17   CR 1.02 1.09 0.91 0.93 0.96 0.83   GFRESTIMATED 76 70 87 86 >60 >60   GFRESTBLACK  --   --   --   --  >60 >60   TRENTON 8.8 8.8 8.9 9.0 8.8 9.2       UA RESULTS:   Recent Labs   Lab Test 10/15/24  0912   SG 1.027   URINEPH 5.0   NITRITE Negative   RBCU >182*   WBCU 0       CALCIUM RESULTS  Lab Results   Component Value Date    TRENTON 8.8 10/15/2024    TRENTON 8.8 05/21/2024    TRENTON 8.9 05/17/2023           Imaging:    I personally reviewed all applicable imaging and went over the below findings with patient.    Results for orders placed or performed during the hospital encounter of 10/15/24   Abd/pelvis CT no contrast - Stone Protocol    Narrative    EXAM: CT ABDOMEN PELVIS W/O CONTRAST  LOCATION: [facility]  DATE: 10/15/2024    INDICATION: LLQ pain  COMPARISON: 1/21/2011  TECHNIQUE: CT scan of the abdomen and pelvis was performed without IV contrast. Multiplanar reformats were obtained. Dose reduction techniques were used.  CONTRAST: None.    FINDINGS:   LOWER CHEST: Mild nonspecific scattered fibrotic change.    HEPATOBILIARY: No significant findings.    PANCREAS: Normal.    SPLEEN: Normal.    ADRENAL GLANDS: Normal.    KIDNEYS/BLADDER: Mildly obstructing 6 mm stone distal left ureter just above the ureterovesical junction. Additional 4 mm nonobstructing stone mid kidney and a couple 1 mm nonobstructing stones in the upper pole.    The right kidney, 3 mm nonobstructing stone mid kidney.    BOWEL: No significant findings. Diverticulum off the fundus of the stomach.    LYMPH NODES: Normal.    VASCULATURE: Moderate calcified plaque. No aneurysms.    PELVIC ORGANS: Urethral sphincter device with reservoir right pelvis.    MUSCULOSKELETAL: No concerning bone lesion.      Impression    IMPRESSION:   1.  Mildly obstructing 6 mm stone distal left ureter.    2.  Bilateral nonobstructing intrarenal stones as above.

## 2024-10-18 NOTE — NURSING NOTE
Current patient location: [place]    Is the patient currently in the state of [place]? YES    Visit mode:VIDEO    If the visit is dropped, the patient can be reconnected by: VIDEO VISIT: Send to e-mail at: [email or web address]    Will anyone else be joining the visit? Wife and patient  (If patient encounters technical issues they should call [phone])    Are changes needed to the allergy or medication list? No, Pt stated no changes to allergies, and Pt stated no med changes    Are refills needed on medications prescribed by this physician? Discuss with provider    Rooming Documentation:    Patient is taking tylenol and Advil (alternating every 3 hours). He uses the Oxycodone PRN.  He has been trying to push fluids, but noted constipation issues.  Daniel reported 1 bowel movement this morning. Prior to that, his last bowel movement was 10/14. He said he normally goes every morning. He took 2 Dulcolax yesterday, but still had trouble this morning.   Patient has a history of kidney stones and was on Flomax in the past. Provider informed via secure chat regarding symptoms and history.    Reason for visit: Consult    Casandra PONCE

## 2024-10-18 NOTE — PATIENT INSTRUCTIONS
UROLOGY CLINIC VISIT PATIENT INSTRUCTIONS    -If having severe flank pain, fevers, chills, nausea, or vomiting please notify Urology clinic or be seen in the ER.     If you have any issues, questions or concerns in the meantime, do not hesitate to contact us at [facility] at [phone] or via [employer].     Torrie Ness, CNP  Department of Urology       Medicines to Control Your Kidney Stone Symptoms    Control Pain: First Line Treatment    Dramamine (Please use the drowsy version, nongeneric formulation)  Available over the counter  **This medicine will cause increased drowsiness. DON T DRIVE OR OPERATE MACHINERY FOR 6 HOURS**    How to take:   Take 50 mg at bedtime every night until the stone passes  In addition, take 50 mg every 6 hours as needed    What it does:  Decreases spasm of the ureter  Decreases recurrence of pain for next 24 hours  Decreases severe pain  Decreases nausea  Will help you sleep    Ibuprofen (Advil or Motrin)  Available over the counter  **Please do not take if advise to avoid NSAIDS, history of stomach ulcers/bleeding issues, blood thinners, or already on NSAIDS**    How to take:   Take 2 to 4 (200 mg) tablets every 6 hours for the first 48 hours. After that, use only as needed    What it does:  Decreases pain  Prevents spasm of the ureter    Acetaminophen (Tylenol)   Available over the counter    How to Take:  Take 2 (500 mg) tablets every 6 hours as needed. Do not exceed 8 tablets (4,000 mg total) in 24 hours    What it does:  Highly effective in controlling pain      Control pain: second line treatment (if you still have severe pain 1 hour after trying all of the above)    Narcotics (oxycodone)     How to take   Take 1 to 2 tablets every 6 hours as needed    Narcotics have major side effects:   Confusion, disorientation and sleepiness. DO NOT DRIVE OR OPERATE MACHINERY WITHIN 24 HOURS.   Nausea. Take Dramamine, Zofran or Haldol to help control this.   May cause  constipation (hard, dry stools). Start over-the-counter Miralax (1/2 to 1 capful) as needed if experiencing constipation.   Trouble sleeping    Other medicines we may give you:    Tamsulosin (Flomax): Take 0.4 mg daily with food     What it does:   May decrease stone pain   May help stones pass faster   May make surgery more successful by improving access to stone   May decrease discomfort from ureteral stent, if used    Possible side effects:   Lightheadedness when standing too quickly (especially in older people)   Stuffy nose

## 2024-10-19 PROCEDURE — 82365 CALCULUS SPECTROSCOPY: CPT | Mod: 90

## 2024-10-19 PROCEDURE — 99000 SPECIMEN HANDLING OFFICE-LAB: CPT

## 2024-10-21 DIAGNOSIS — N20.1 LEFT URETERAL STONE: Primary | ICD-10-CM

## 2024-10-23 ENCOUNTER — LAB (OUTPATIENT)
Dept: LAB | Facility: CLINIC | Age: 79
End: 2024-10-23
Payer: COMMERCIAL

## 2024-10-23 DIAGNOSIS — N20.1 LEFT URETERAL STONE: ICD-10-CM

## 2024-10-27 LAB
APPEARANCE STONE: NORMAL
COMPN STONE: NORMAL
SPECIMEN WT: 52 MG

## 2024-10-28 ENCOUNTER — HOSPITAL ENCOUNTER (OUTPATIENT)
Dept: RADIOLOGY | Facility: CLINIC | Age: 79
Discharge: HOME | End: 2024-10-28
Attending: NURSE PRACTITIONER | Admitting: NURSE PRACTITIONER
Payer: COMMERCIAL

## 2024-10-28 DIAGNOSIS — N20.1 LEFT URETERAL STONE: ICD-10-CM

## 2024-10-28 PROCEDURE — 74018 RADEX ABDOMEN 1 VIEW: CPT

## 2024-10-31 ENCOUNTER — OFFICE VISIT (OUTPATIENT)
Dept: FAMILY MEDICINE | Facility: CLINIC | Age: 79
End: 2024-10-31
Payer: COMMERCIAL

## 2024-10-31 VITALS
HEART RATE: 67 BPM | OXYGEN SATURATION: 96 % | WEIGHT: 225.5 LBS | SYSTOLIC BLOOD PRESSURE: 110 MMHG | TEMPERATURE: 97.8 F | BODY MASS INDEX: 31.57 KG/M2 | RESPIRATION RATE: 18 BRPM | HEIGHT: 71 IN | DIASTOLIC BLOOD PRESSURE: 64 MMHG

## 2024-10-31 DIAGNOSIS — G89.29 CHRONIC MIDLINE LOW BACK PAIN WITHOUT SCIATICA: ICD-10-CM

## 2024-10-31 DIAGNOSIS — R68.83 CHILLS: ICD-10-CM

## 2024-10-31 DIAGNOSIS — M54.50 CHRONIC MIDLINE LOW BACK PAIN WITHOUT SCIATICA: ICD-10-CM

## 2024-10-31 DIAGNOSIS — R73.01 ELEVATED FASTING GLUCOSE: Primary | ICD-10-CM

## 2024-10-31 DIAGNOSIS — N23 RENAL COLIC: ICD-10-CM

## 2024-10-31 DIAGNOSIS — I42.9 CARDIOMYOPATHY, UNSPECIFIED TYPE (H): ICD-10-CM

## 2024-10-31 LAB
ALBUMIN UR-MCNC: NEGATIVE MG/DL
APPEARANCE UR: CLEAR
BACTERIA #/AREA URNS HPF: ABNORMAL /HPF
BILIRUB UR QL STRIP: NEGATIVE
COLOR UR AUTO: YELLOW
ERYTHROCYTE [DISTWIDTH] IN BLOOD BY AUTOMATED COUNT: 12.3 % (ref 10–15)
ERYTHROCYTE [SEDIMENTATION RATE] IN BLOOD BY WESTERGREN METHOD: 34 MM/HR (ref 0–20)
EST. AVERAGE GLUCOSE BLD GHB EST-MCNC: 131 MG/DL
GLUCOSE UR STRIP-MCNC: NEGATIVE MG/DL
HBA1C MFR BLD: 6.2 % (ref 0–5.6)
HCT VFR BLD AUTO: 43.3 % (ref 40–53)
HGB BLD-MCNC: 14.1 G/DL (ref 13.3–17.7)
HGB UR QL STRIP: ABNORMAL
KETONES UR STRIP-MCNC: NEGATIVE MG/DL
LEUKOCYTE ESTERASE UR QL STRIP: NEGATIVE
MCH RBC QN AUTO: 31 PG (ref 26.5–33)
MCHC RBC AUTO-ENTMCNC: 32.6 G/DL (ref 31.5–36.5)
MCV RBC AUTO: 95 FL (ref 78–100)
NITRATE UR QL: NEGATIVE
PH UR STRIP: 6 [PH] (ref 5–8)
PLATELET # BLD AUTO: 383 10E3/UL (ref 150–450)
RBC # BLD AUTO: 4.55 10E6/UL (ref 4.4–5.9)
RBC #/AREA URNS AUTO: ABNORMAL /HPF
SP GR UR STRIP: 1.01 (ref 1–1.03)
SQUAMOUS #/AREA URNS AUTO: ABNORMAL /LPF
UROBILINOGEN UR STRIP-ACNC: 0.2 E.U./DL
WBC # BLD AUTO: 5.9 10E3/UL (ref 4–11)
WBC #/AREA URNS AUTO: ABNORMAL /HPF

## 2024-10-31 PROCEDURE — G2211 COMPLEX E/M VISIT ADD ON: HCPCS | Performed by: NURSE PRACTITIONER

## 2024-10-31 PROCEDURE — 85652 RBC SED RATE AUTOMATED: CPT | Performed by: NURSE PRACTITIONER

## 2024-10-31 PROCEDURE — 85027 COMPLETE CBC AUTOMATED: CPT | Performed by: NURSE PRACTITIONER

## 2024-10-31 PROCEDURE — 83036 HEMOGLOBIN GLYCOSYLATED A1C: CPT | Performed by: NURSE PRACTITIONER

## 2024-10-31 PROCEDURE — 80053 COMPREHEN METABOLIC PANEL: CPT | Performed by: NURSE PRACTITIONER

## 2024-10-31 PROCEDURE — 81001 URINALYSIS AUTO W/SCOPE: CPT | Performed by: NURSE PRACTITIONER

## 2024-10-31 PROCEDURE — 86140 C-REACTIVE PROTEIN: CPT | Performed by: NURSE PRACTITIONER

## 2024-10-31 PROCEDURE — 36415 COLL VENOUS BLD VENIPUNCTURE: CPT | Performed by: NURSE PRACTITIONER

## 2024-10-31 PROCEDURE — 87086 URINE CULTURE/COLONY COUNT: CPT | Performed by: NURSE PRACTITIONER

## 2024-10-31 PROCEDURE — 99214 OFFICE O/P EST MOD 30 MIN: CPT | Performed by: NURSE PRACTITIONER

## 2024-10-31 ASSESSMENT — PAIN SCALES - GENERAL: PAINLEVEL_OUTOF10: NO PAIN (0)

## 2024-10-31 NOTE — PROGRESS NOTES
Assessment & Plan     ICD-10-CM    1. Elevated fasting glucose  R73.01 Hemoglobin A1c     Hemoglobin A1c      2. Renal colic  N23 Comprehensive metabolic panel (BMP + Alb, Alk Phos, ALT, AST, Total. Bili, TP)     UA with Microscopic - lab collect     Urine Culture Aerobic Bacterial - lab collect     Comprehensive metabolic panel (BMP + Alb, Alk Phos, ALT, AST, Total. Bili, TP)     UA with Microscopic - lab collect     Urine Culture Aerobic Bacterial - lab collect     Urine Microscopic Exam      3. Chills  R68.83 CBC with platelets     ESR: Erythrocyte sedimentation rate     CRP, inflammation     CBC with platelets     ESR: Erythrocyte sedimentation rate     CRP, inflammation     sulfamethoxazole-trimethoprim (BACTRIM DS) 800-160 MG tablet      4. Chronic midline low back pain without sciatica  M54.50 MR LUMBAR SPINE W/O & W CONTRAST    G89.29       5. Cardiomyopathy, unspecified type (H)  I42.9         Given history of surgery and progressive pain, will update lumbar MRI.  Discussed possibility of PT as well.  Given persistent ill symptoms will check labs.  Vital signs reassuring.  Does have follow-up with urology and will keep them informed of results as they come through.  History of cardiomyopathy/CAD managed by cardiology.  Follow-up with them as previously recommended.  Okay to DC tamsulosin    Reviewed ED note x 1, urology note x 1, CBC x 1, metabolic panel x 1, CT abdomen x 1, x-ray abdomen x 1    Addendum: CBC and metabolic panel reassuring.  Elevated inflammatory markers.  Given recent stones and continued chills/fevers will treat with Bactrim out of an abundance of caution    Subjective     HPI     Patient presents today following ED evaluation on 10/15/2024.  Was having significant flank pains.  Diagnosed with 6 mm stone.  Follow-up with kidney stone [place].  Was able to successfully pass the stone with the addition of Flomax.  Since then, has still been having some ill symptoms including intermittent  "fatigue, chills, aches, feeling feverish, and decreased appetite.  No hematuria.    Patient brings in results of an abdominal x-ray he also had completed showing degenerative changes along the lumbar spine.  Standing history of back pain that has been worsening.  History of lumbar surgery.  Sounds like he may have had a microdiscectomy.  This was 40+ years ago and records are unavailable to be reviewed.    Review of Systems - negative except for what's listed in the HPI      Objective    /64 (BP Location: Right arm, Patient Position: Sitting, Cuff Size: Adult Regular)   Pulse 67   Temp 97.8  F (36.6  C) (Oral)   Resp 18   Ht 1.81 m (5' 11.25\")   Wt 102.3 kg (225 lb 8 oz)   SpO2 96%   BMI 31.23 kg/m    Physical Exam   General appearance - alert, well appearing, and in no distress  Mental status - alert, oriented to person, place, and time  Mouth - mucous membranes moist. No oral lesions.  Neck - no significant adenopathy  Lymphatics - no palpable lymphadenopathy  Chest - clear to auscultation, no wheezes, rales or rhonchi, symmetric air entry  Heart - normal rate and regular rhythm, S1 and S2 normal, no murmurs noted  Abdomen - soft, nontender, nondistended, no masses or organomegaly  Neurological - alert, oriented, normal speech, no focal findings or movement disorder noted, neck supple without rigidity, cranial nerves II through XII intact, motor and sensory grossly normal bilaterally, normal muscle tone, no tremors, strength 5/5  Musculoskeletal -no CVA tenderness with percussion.  No discomfort with palpation along the length of the spine  Extremities - peripheral pulses normal, no peripheral edema  Skin - normal coloration and turgor.    Goran Mckeon, CNP    This note has been dictated using voice recognition software. Any grammatical or context distortions are unintentional and inherent to the software.    "

## 2024-10-31 NOTE — PATIENT INSTRUCTIONS
MRI of the lumbar spine ordered.  Scheduling information is highlighted in your paperwork.    Because you keep getting these ill symptoms we are going to do some urine and blood testing today to see if there is any sign of active infection.  We will update your urology team with this information.

## 2024-11-01 LAB
ALBUMIN SERPL BCG-MCNC: 3.8 G/DL (ref 3.5–5.2)
ALP SERPL-CCNC: 127 U/L (ref 40–150)
ALT SERPL W P-5'-P-CCNC: 22 U/L (ref 0–70)
ANION GAP SERPL CALCULATED.3IONS-SCNC: 9 MMOL/L (ref 7–15)
AST SERPL W P-5'-P-CCNC: 26 U/L (ref 0–45)
BILIRUB SERPL-MCNC: 0.5 MG/DL
BUN SERPL-MCNC: 13.8 MG/DL (ref 8–23)
CALCIUM SERPL-MCNC: 9.1 MG/DL (ref 8.8–10.4)
CHLORIDE SERPL-SCNC: 102 MMOL/L (ref 98–107)
CREAT SERPL-MCNC: 1.01 MG/DL (ref 0.67–1.17)
CRP SERPL-MCNC: 36.8 MG/L
EGFRCR SERPLBLD CKD-EPI 2021: 77 ML/MIN/1.73M2
GLUCOSE SERPL-MCNC: 104 MG/DL (ref 70–99)
HCO3 SERPL-SCNC: 25 MMOL/L (ref 22–29)
POTASSIUM SERPL-SCNC: 5 MMOL/L (ref 3.4–5.3)
PROT SERPL-MCNC: 7.5 G/DL (ref 6.4–8.3)
SODIUM SERPL-SCNC: 136 MMOL/L (ref 135–145)

## 2024-11-02 PROBLEM — I73.9 PERIPHERAL VASCULAR DISEASE (H): Status: RESOLVED | Noted: 2020-12-22 | Resolved: 2024-11-02

## 2024-11-02 LAB — BACTERIA UR CULT: NORMAL

## 2024-11-02 RX ORDER — SULFAMETHOXAZOLE AND TRIMETHOPRIM 800; 160 MG/1; MG/1
1 TABLET ORAL 2 TIMES DAILY
Qty: 14 TABLET | Refills: 0 | Status: SHIPPED | OUTPATIENT
Start: 2024-11-02

## 2024-11-18 ENCOUNTER — TRANSFERRED RECORDS (OUTPATIENT)
Dept: HEALTH INFORMATION MANAGEMENT | Facility: CLINIC | Age: 79
End: 2024-11-18

## 2024-11-18 ENCOUNTER — VIRTUAL VISIT (OUTPATIENT)
Dept: UROLOGY | Facility: CLINIC | Age: 79
End: 2024-11-18
Payer: COMMERCIAL

## 2024-11-18 DIAGNOSIS — N20.0 CALCIUM OXALATE KIDNEY STONES: ICD-10-CM

## 2024-11-18 DIAGNOSIS — N20.0 NEPHROLITHIASIS: Primary | ICD-10-CM

## 2024-11-18 PROCEDURE — 99214 OFFICE O/P EST MOD 30 MIN: CPT | Mod: 95 | Performed by: NURSE PRACTITIONER

## 2024-11-18 ASSESSMENT — PAIN SCALES - GENERAL: PAINLEVEL_OUTOF10: NO PAIN (0)

## 2024-11-18 NOTE — PATIENT INSTRUCTIONS
"DIET & LIFESTYLE CHANGES FOR PATIENTS WITH KIDNEY STONES    If you've had a kidney stone, you are likely to form another. Risk of recurrence is 15% at 1 year, 35% to 40% at 2 years, and 50% at 10 years. Therefore, prevention is very important.  Because of the chemical analysis of both your stone & urine, some changes in your diet & lifestyle are recommended. These recommendations have shown to be effective.    CALCIUM STONES (Oxalate and Phosphate)    Fluid intake is the most important prevention measure to help prevent stones. Fluid intake should be at least 2.5 liters per day or 90-120oz per day. With goal of urine output of >2.5L per day.   Increasing liquids that have citric acid may help such as low calorie orange juice, lemonade (Crystal Light Lemonade or True Lemon/Lime), or adding a citrus to your water.  You can add lemon juice or fresh my to your water daily.  Lemon juice increases the citrate in your urine, and helps decrease the formation of stone and even breakdown certain types of stones. Add a cap full/teaspoon of pure lemon juice to each glass.   Try to limit sugar, especially if you have diabetes.    Helpful Fluid Measurements:  1 liter = 34oz  1 quart = 32 oz  24 pack water: Each bottle 16.9 oz     Low Oxalate Diet: Limit your consumption of OXALATE-rich foods including:  All nuts and nut products including peanuts, almonds,peanut butter, almond milk  Spinach  Rhubarb  Beets  Chocolate  Soybeans and soy products     Website:   www.[employer]    Below is a link to a PDF that is based on [employer] research. Please stick to pages 6-9 of this document. My suggestion is to review the list of food that is OK. The \"avoid\" list can be overwhelming.  [email or web address]    Low Sodium Diet: Salt (sodium chloride) is found in abundance in many foods. High " sodium levels in the urine can interfere with the kidney's handling of calcium.   Trying a DASH (Dietary Approaches to Stop Hypertension) diet which is eating more fruits and vegetables, limiting salt intake, moderate in low-fat diary products, and low in animal protein.   Try to decrease salt intake to <2000 mg of sodium daily.     Tips for reducing the salt in your diet:  Don't use salt at the table  Reduce the salt used in food preparation. Try 1/2 teaspoon when recipes call for 1 teaspoon.  Use herbs and spices for flavoring instead of salt.  Avoid salty foods.  Check the label before you buy or use a product. Note sodium and portion size information.  Try to consume less than 2,000 mg/day. (1 teaspoon = 2,000 mg)    Foods with high sodium content include:  Processed meat (including luncheon meats, sausage)   Crackers   Instant cereal   Processed cheese   Canned soups   Chips and snack foods   Soy sauce    Low Animal Protein: Reduce animal protein (meat) intake to no more than 8-10 ounces per day. Increase fruit and vegetables to 5 servings per day.    Maintain a normal calcium diet: Calcium rich foods are encouraged, but no more than 1000 - 1200 mg per day. Researches have found that people with low calcium intakes tend to have more stones. Foods with high calcium content are acceptable and include:  Calcium rich foods include:   Diary (cheese, milk, and yogurt)  Enriched cereals  Meat and fish  Dark green vegetables  Non-Dairy Calcium Sources:  Fortified Coconut, Rice, Flax, Oat milk  (avoid almond milk)  Calcium fortified Orange Juice  - look for a low sugar/light variety  Canned sardines, canned pink salmon with bones  - look for low sodium options  Fortified cereals  Oatmeal  Broccoli, peas, chinese cabbage/bok prabhu, Kale, mustard greens, pistachio nuts, mung beans, red kidney beans     Limit Vitamin C intake to < 1000 mg daily.

## 2024-11-18 NOTE — PROGRESS NOTES
Urology Video Office Visit    Video-Visit Details    Type of service:  Video Visit    Video Start Time: 0830    Video End Time: 0900    Originating Location (pt. Location): Home    Distant Location (provider location):  On-site     Platform used for Video Visit: CoNarrative           Assessment and Plan:     Assessment:79 year old male with CaOx stones    Plan:  -Reviewed stone analysis with patient. The patient and I discussed the diagnosis and natural history of urolithiasis. We discussed some general measures to prevent recurrent kidney stones.  These include fluid intake to achieve 2.5 liters of urine per day, decreased salt intake, normal calcium intake, lowering animal protein intake, avoiding high amounts of oxalate containing foods, and increased citrate intake with orange juice/lemonade.  -Discussed option of 24 hour urine study for further evaluation for risks of stone. Pt deferred at this time.   -If having severe flank pain, fevers, chills, nausea, or vomiting please notify Urology clinic or be seen in the ER.   -RTC in 1 year with CT scan or sooner LASHELL Ness CNP  Department of Urology  November 18, 2024    I spent a total of 30 minutes spent on the date of the encounter doing chart review, history and exam, documentation, and further activities as noted above.          Chief Complaint:   Left Ureteral Stone         History of Present Illness:    Daniel Jacobs is a pleasant 79 year old male who presents with concerns of a left ureteral stone    Mr. Jacobs was last seen on 10/18/24 with a left distal ureteral stone.     He was able to pass and retrieve his stone on 10/19/24  Stone Analysis: CaOx    KUB on 10/28/24 (images personally reviewed) revealed no noted left ureteral stone. Noted left phlebolith.      He was initially seen in the ER on 10/15/24 for LLQ pain with nausea. CT scan on 10/15/24 (images personally reviewed) revealed a left 6mm distal ureteral stone with hydronephrosis.  Noted several left nonobstructing renal stones. Noted right nonobstructing renal stones without hydronephrosis.      He has had 2 prior stone episode which he has been able to pass spontaneously.      No known family history of stones.      Stone Risk Factors: Vitamin C use-which he has since stopped.       History of prostate CA with AUS in 2014.    He is currently pre-diabetic. He likes to drink orange juice, lemonade, and water. Does like to consume almond milk on his cereal. Does tolerate dairy products.          Past Medical History:     Past Medical History:   Diagnosis Date    Cardiac abnormality     Low back pain     Prostate cancer (H) 2007    Superficial thrombosis of right lower extremity 05/17/2023            Past Surgical History:     Past Surgical History:   Procedure Laterality Date    CARPAL TUNNEL RELEASE RT/LT Left     HERNIA REPAIR      INSERT / REPLACE / REMOVE PROSTHESIS URETHRAL SPHINCTER N/A 06/23/2014    Procedure: ARTIFICIAL URINARY SPHINCTER INSERTION;  Surgeon: Torres Sanabria MD;  Location: [facility];  Service:     INSERT / REPLACE / REMOVE PROSTHESIS URETHRAL SPHINCTER N/A 02/16/2015    Procedure: REMOVAL/REPLACEMENT OF ARTIFICIAL URINARY SPHINCTER COMPONENT;  Surgeon: Torres Sanabria MD;  Location: [facility];  Service:     LUMBAR SPINE SURGERY  01/01/1981    PROSTATECTOMY              Medications     Current Outpatient Medications   Medication Sig Dispense Refill    aspirin 81 MG EC tablet Take 1 tablet (81 mg) by mouth daily      atorvastatin (LIPITOR) 80 MG tablet Take 1 tablet (80 mg) by mouth daily 90 tablet 3    ketoconazole (NIZORAL) 2 % external cream Apply topically as needed      LUTEIN EXTRACT-ZEAXANTHIN EXT ORAL [LUTEIN EXTRACT-ZEAXANTHIN EXT ORAL] Take 1 tablet by mouth daily.      metoprolol tartrate (LOPRESSOR) 25 MG tablet Take 0.5 tablets (12.5 mg) by mouth 2 times daily 90 tablet 3    nitroGLYcerin (NITROSTAT) 0.4 MG sublingual tablet Place 1 tablet  (0.4 mg) under the tongue every 5 minutes as needed for chest pain For chest pain place 1 tablet under the tongue every 5 minutes for 3 doses. If symptoms persist 5 minutes after 1st dose call 911. 25 tablet 0    sulfamethoxazole-trimethoprim (BACTRIM DS) 800-160 MG tablet Take 1 tablet by mouth 2 times daily. 14 tablet 0     No current facility-administered medications for this visit.            Family History:     Family History   Problem Relation Age of Onset    Prostate Cancer Brother             Social History:     Social History     Socioeconomic History    Marital status:      Spouse name: Not on file    Number of children: Not on file    Years of education: Not on file    Highest education level: Not on file   Occupational History    Not on file   Tobacco Use    Smoking status: Never    Smokeless tobacco: Never   Vaping Use    Vaping status: Never Used   Substance and Sexual Activity    Alcohol use: No    Drug use: No    Sexual activity: Not on file   Other Topics Concern    Not on file   Social History Narrative    Not on file     Social Drivers of Health     Financial Resource Strain: Low Risk  (5/14/2024)    Financial Resource Strain     Within the past 12 months, have you or your family members you live with been unable to get utilities (heat, electricity) when it was really needed?: No   Food Insecurity: Low Risk  (5/14/2024)    Food Insecurity     Within the past 12 months, did you worry that your food would run out before you got money to buy more?: No     Within the past 12 months, did the food you bought just not last and you didn t have money to get more?: No   Transportation Needs: Low Risk  (5/14/2024)    Transportation Needs     Within the past 12 months, has lack of transportation kept you from medical appointments, getting your medicines, non-medical meetings or appointments, work, or from getting things that you need?: No   Physical Activity: Insufficiently Active (5/14/2024)     Exercise Vital Sign     Days of Exercise per Week: 3 days     Minutes of Exercise per Session: 40 min   Stress: No Stress Concern Present (5/14/2024)    [employer] - Occupational Stress Questionnaire     Feeling of Stress : Not at all   Social Connections: Unknown (5/14/2024)    Social Connection and Isolation Panel [NHANES]     Frequency of Communication with Friends and Family: Not on file     Frequency of Social Gatherings with Friends and Family: Three times a week     Attends L[employer] day Services: Not on file     Active Member of Clubs or Organizations: Not on file     Attends Club or Organization Meetings: Not on file     Marital Status: Not on file   Interpersonal Safety: Low Risk  (5/21/2024)    Interpersonal Safety     Do you feel physically and emotionally safe where you currently live?: Yes     Within the past 12 months, have you been hit, slapped, kicked or otherwise physically hurt by someone?: No     Within the past 12 months, have you been humiliated or emotionally abused in other ways by your partner or ex-partner?: No   Housing Stability: Low Risk  (5/14/2024)    Housing Stability     Do you have housing? : Yes     Are you worried about losing your housing?: No            Allergies:   Amoxicillin-pot clavulanate, Benzocaine, and Guaifenesin-codeine         Review of Systems:  From intake questionnaire   Negative 14 system review except as noted on HPI, nurse's note.         Physical Exam:   General Appearance: Well groomed, hygenic  Eyes: No redness, discharge  Respiratory: No cough, no respiratory distress or labored breathing  Musculoskeletal: Grossly normal, full range of motion in upper extremities, no gross deficits  Skin: No discoloration or apparent rashes  Neurologic - No tremors  Psychiatric - Alert and oriented  The rest of a comprehensive physical examination is deferred due to video visit restrictions        Labs:    I personally reviewed all applicable  laboratory data and went over findings with patient  Significant for:    CBC RESULTS:  Recent Labs   Lab Test 10/31/24  1121 10/15/24  0805 05/21/24  1454   WBC 5.9 7.5 4.9   HGB 14.1 14.1 13.9    243 239        BMP RESULTS:  Recent Labs   Lab Test 10/31/24  1121 10/15/24  0805 05/21/24  1454 05/17/23  1038 01/21/22  0806 12/22/20  1154 03/25/19  1140    138 141 141   < > 143 141   POTASSIUM 5.0 4.1 4.6 4.4   < > 5.2* 4.5   CHLORIDE 102 104 107 107   < > 108* 107   CO2 25 22 25 22   < > 28 24   ANIONGAP 9 12 9 12   < > 7 10   * 155* 115* 101*   < > 101 99   BUN 13.8 16.4 23.8* 16.2   < > 17 17   CR 1.01 1.02 1.09 0.91   < > 0.96 0.83   GFRESTIMATED 77 76 70 87   < > >60 >60   GFRESTBLACK  --   --   --   --   --  >60 >60   TRENTON 9.1 8.8 8.8 8.9   < > 8.8 9.2    < > = values in this interval not displayed.       UA RESULTS:   Recent Labs   Lab Test 10/31/24  1126 10/15/24  0912   SG 1.015 1.027   URINEPH 6.0 5.0   NITRITE Negative Negative   RBCU None Seen >182*   WBCU None Seen 0       CALCIUM RESULTS  Lab Results   Component Value Date    TRENTON 9.1 10/31/2024    TRENTON 8.8 10/15/2024    TRENTON 8.8 05/21/2024           Imaging:    I personally reviewed all applicable imaging and went over the below findings with patient.    EXAM: XR ABDOMEN 1 VIEW  LOCATION: [facility]  DATE: 10/28/2024     INDICATION: left ureteral stone  COMPARISON: CT AP 10/15/2024                                                                      IMPRESSION: In the abdomen, only one of the larger left renal stones is seen by plain film which measures 2 x 3 mm.     In the pelvis, there are 2 small calcifications which seem to correspond to phleboliths.      The left ureteral stone by the pelvic sidewall on prior CT is larger than the small phlebolith that is visible on plain film.        Contrast pattern is unremarkable. Paucity of stool. Lumbar rotoscoliosis convex to the right centered at L3 with prominent  hypertrophic spurring at L2-3 and L4-5. Portion of the penile implant is visible.

## 2024-11-18 NOTE — NURSING NOTE
Is the patient currently in the state of [place]? YES    Visit mode:VIDEO    If the visit is dropped, the patient can be reconnected by: VIDEO VISIT: Send to e-mail at: [email or web address]    Will anyone else be joining the visit? NO  (If patient encounters technical issues they should call [phone]    How would you like to obtain your AVS? MyChart    Are changes needed to the allergy or medication list? No    Are refills needed on medications prescribed by this physician? NO    Reason for visit: Follow Up    Tiffany PONCE

## 2024-11-19 ENCOUNTER — HOSPITAL ENCOUNTER (OUTPATIENT)
Dept: MRI IMAGING | Facility: CLINIC | Age: 79
Discharge: HOME | End: 2024-11-19
Attending: NURSE PRACTITIONER
Payer: COMMERCIAL

## 2024-11-19 DIAGNOSIS — M54.50 CHRONIC MIDLINE LOW BACK PAIN WITHOUT SCIATICA: ICD-10-CM

## 2024-11-19 DIAGNOSIS — G89.29 CHRONIC MIDLINE LOW BACK PAIN WITHOUT SCIATICA: ICD-10-CM

## 2024-11-19 PROCEDURE — 72148 MRI LUMBAR SPINE W/O DYE: CPT

## 2024-11-20 ENCOUNTER — PATIENT OUTREACH (OUTPATIENT)
Dept: CARE COORDINATION | Facility: CLINIC | Age: 79
End: 2024-11-20
Payer: COMMERCIAL

## 2024-11-21 DIAGNOSIS — M54.50 CHRONIC MIDLINE LOW BACK PAIN WITHOUT SCIATICA: Primary | ICD-10-CM

## 2024-11-21 DIAGNOSIS — G89.29 CHRONIC MIDLINE LOW BACK PAIN WITHOUT SCIATICA: Primary | ICD-10-CM

## 2024-11-21 RX ORDER — GABAPENTIN 100 MG/1
100-300 CAPSULE ORAL 3 TIMES DAILY PRN
Qty: 60 CAPSULE | Refills: 0 | Status: SHIPPED | OUTPATIENT
Start: 2024-11-21

## 2025-01-27 ASSESSMENT — ACTIVITIES OF DAILY LIVING (ADL)
I_HAVE_ONLY_WALKED_SHORT_DISTANCES_BECAUSE_OF_MY_BACK_PAIN: AGREE
ADL_COUNT: 17
WALKING_APPROXIMATELY_10_MINUTES: SLIGHT DIFFICULTY
WALK: ACTIVITY IS MINIMALLY DIFFICULT
RUNNING_ONE_MILE: UNABLE TO DO
HOW_WOULD_YOU_RATE_YOUR_CURRENT_LEVEL_OF_FUNCTION_DURING_YOUR_SPORTS_RELATED_ACTIVITIES_FROM_0_TO_100_WITH_100_BEING_YOUR_LEVEL_OF_FUNCTION_PRIOR_TO_YOUR_HIP_PROBLEM_AND_0_BEING_THE_INABILITY_TO_PERFORM_ANY_OF_YOUR_USUAL_DAILY_ACTIVITIES?: 60
GETTING_INTO_AND_OUT_OF_AN_AVERAGE_CAR: MODERATE DIFFICULTY
WALKING_UP_STEEP_HILLS: SLIGHT DIFFICULTY
ADL_TOTAL_ITEM_SCORE: 0
PAIN: THE SYMPTOM AFFECTS MY ACTIVITY MODERATELY
SQUAT: ACTIVITY IS VERY DIFFICULT
LOW_IMPACT_ACTIVITIES_LIKE_FAST_WALKING: SLIGHT DIFFICULTY
CUTTING/LATERAL_MOVEMENTS: NO DIFFICULTY AT ALL
SWELLING: I HAVE THE SYMPTOM BUT IT DOES NOT AFFECT MY ACTIVITY
WEAKNESS: THE SYMPTOM AFFECTS MY ACTIVITY SLIGHTLY
RECREATIONAL_ACTIVITIES: SLIGHT DIFFICULTY
WALKING_DOWN_STEEP_HILLS: SLIGHT DIFFICULTY
PAIN_INTENSITY: THE PAIN IS VERY MILD AT THE MOMENT.
HOS_ADL_HIGHEST_POTENTIAL_SCORE: 64
DEEP SQUATTING: SLIGHT DIFFICULTY
IN_THE_LAST_2_WEEKS_I_HAVE_DRESSED_MORE_SLOWLY_THAN_USUAL_BECAUSE_OF_BACK_PAIN: AGREE
SIT WITH YOUR KNEE BENT: ACTIVITY IS NOT DIFFICULT
GETTING INTO AND OUT OF AN AVERAGE CAR: MODERATE DIFFICULTY
STAND: ACTIVITY IS NOT DIFFICULT
TWISTING/PIVOTING ON INVOLVED LEG: NO DIFFICULTY AT ALL
KNEEL ON THE FRONT OF YOUR KNEE: ACTIVITY IS NOT DIFFICULT
LIMPING: I HAVE THE SYMPTOM BUT IT DOES NOT AFFECT MY ACTIVITY
RAW_SCORE: 51
SECTION_4-WALKING: PAIN PREVENTS ME FROM WALKING MORE THAN ONE MILE.
KNEE_ACTIVITY_OF_DAILY_LIVING_SUM: 51
PERSONAL_CARE: IT IS PAINFUL TO LOOK AFTER MYSELF AND I AM SLOW AND CAREFUL.
ROLLING OVER IN BED: NO DIFFICULTY AT ALL
PUTTING_ON_SOCKS_AND_SHOES: NO DIFFICULTY AT ALL
COMPUTED_OSWESTRY_SCORE: 24
STANDING_FOR_15_MINUTES: SLIGHT DIFFICULTY
SWELLING: I HAVE THE SYMPTOM BUT IT DOES NOT AFFECT MY ACTIVITY
KEELE ASSESSMENT OF PARTICIPATION_SUB_SCORE_(Q5-9): 3
LIGHT_TO_MODERATE_WORK: SLIGHT DIFFICULTY
DEEP_SQUATTING: SLIGHT DIFFICULTY
SPORTS_SCORE(%): 0
WALKING_15_MINUTES_OR_GREATER: MODERATE DIFFICULTY
SITTING_FOR_15_MINUTES: NO DIFFICULTY AT ALL
IN_GENERAL_I_HAVE_NOT_ENJOYED_ALL_THE_THINGS_I_USED_TO_ENJOY: AGREE
GOING_UP_1_FLIGHT_OF_STAIRS: MODERATE DIFFICULTY
GIVING WAY, BUCKLING OR SHIFTING OF KNEE: I DO NOT HAVE THE SYMPTOM
STIFFNESS: I HAVE THE SYMPTOM BUT IT DOES NOT AFFECT MY ACTIVITY
HOW_WOULD_YOU_RATE_THE_CURRENT_FUNCTION_OF_YOUR_KNEE_DURING_YOUR_USUAL_DAILY_ACTIVITIES_ON_A_SCALE_FROM_0_TO_100_WITH_100_BEING_YOUR_LEVEL_OF_KNEE_FUNCTION_PRIOR_TO_YOUR_INJURY_AND_0_BEING_THE_INABILITY_TO_PERFORM_ANY_OF_YOUR_USUAL_DAILY_ACTIVITIES?: 70
ADL_HIGHEST_POTENTIAL_SCORE: 68
SITTING: I CAN SIT IN MY FAVORITE CHAIR AS LONG AS I LIKE.
SITTING FOR 15 MINUTES: NO DIFFICULTY AT ALL
HOW_WOULD_YOU_RATE_THE_CURRENT_FUNCTION_OF_YOUR_KNEE_DURING_YOUR_USUAL_DAILY_ACTIVITIES_ON_A_SCALE_FROM_0_TO_100_WITH_100_BEING_YOUR_LEVEL_OF_KNEE_FUNCTION_PRIOR_TO_YOUR_INJURY_AND_0_BEING_THE_INABILITY_TO_PERFORM_ANY_OF_YOUR_USUAL_DAILY_ACTIVITIES?: 70
SLEEPING: MY SLEEP IS NEVER INTERRUPTED BY PAIN.
GOING_DOWN_1_FLIGHT_OF_STAIRS: SLIGHT DIFFICULTY
AS_A_RESULT_OF_YOUR_KNEE_INJURY,_HOW_WOULD_YOU_RATE_YOUR_CURRENT_LEVEL_OF_DAILY_ACTIVITY?: NEARLY NORMAL
STEPPING UP AND DOWN CURBS: SLIGHT DIFFICULTY
GO DOWN STAIRS: ACTIVITY IS SOMEWHAT DIFFICULT
RISE FROM A CHAIR: ACTIVITY IS SOMEWHAT DIFFICULT
GOING DOWN 1 FLIGHT OF STAIRS: SLIGHT DIFFICULTY
HOS_ADL_SCORE(%): 73.44
HOW_WOULD_YOU_RATE_YOUR_CURRENT_LEVEL_OF_FUNCTION_DURING_YOUR_USUAL_ACTIVITIES_OF_DAILY_LIVING_FROM_0_TO_100_WITH_100_BEING_YOUR_LEVEL_OF_FUNCTION_PRIOR_TO_YOUR_HIP_PROBLEM_AND_0_BEING_THE_INABILITY_TO_PERFORM_ANY_OF_YOUR_USUAL_DAILY_ACTIVITIES?: 35
JUMPING: MODERATE DIFFICULTY
STANDING: PAIN PREVENTS ME FROM STANDING FOR MORE THAN HALF AN HOUR.
PLEASE_INDICATE_YOR_PRIMARY_REASON_FOR_REFERRAL_TO_THERAPY:: HIP
SPORTS_COUNT: 9
STARTING_AND_STOPPING_QUICKLY: NO DIFFICULTY AT ALL
KNEEL ON THE FRONT OF YOUR KNEE: ACTIVITY IS NOT DIFFICULT
GO UP STAIRS: ACTIVITY IS SOMEWHAT DIFFICULT
HEAVY_WORK: SLIGHT DIFFICULTY
I_HAVE_HAD_PAIN_IN_THE_SHOULDER_OR_NECK_AT_SOME_TIME_IN_THE_LAST_2_WEEKS: DISAGREE
SWINGING_OBJECTS_LIKE_A_GOLF_CLUB: SLIGHT DIFFICULTY
STIFFNESS: I HAVE THE SYMPTOM BUT IT DOES NOT AFFECT MY ACTIVITY
WALKING_FOR_APPROXIMATELY_10_MINUTES: SLIGHT DIFFICULTY
WALK: ACTIVITY IS MINIMALLY DIFFICULT
KEELE_TOTAL_SCORE: 5
SPORTS_TOTAL_ITEM_SCORE: 0
AS_A_RESULT_OF_YOUR_KNEE_INJURY,_HOW_WOULD_YOU_RATE_YOUR_CURRENT_LEVEL_OF_DAILY_ACTIVITY?: NEARLY NORMAL
WEAKNESS: THE SYMPTOM AFFECTS MY ACTIVITY SLIGHTLY
HEAVY_WORK: SLIGHT DIFFICULTY
ADL_SCORE(%): 0
GO UP STAIRS: ACTIVITY IS SOMEWHAT DIFFICULT
WALKING_INITIALLY: SLIGHT DIFFICULTY
LANDING: SLIGHT DIFFICULTY
HOW_WOULD_YOU_RATE_YOUR_CURRENT_LEVEL_OF_FUNCTION_DURING_YOUR_USUAL_ACTIVITIES_OF_DAILY_LIVING_FROM_0_TO_100_WITH_100_BEING_YOUR_LEVEL_OF_FUNCTION_PRIOR_TO_YOUR_HIP_PROBLEM_AND_0_BEING_THE_INABILITY_TO_PERFORM_ANY_OF_YOUR_USUAL_DAILY_ACTIVITIES?: 35
SQUAT: ACTIVITY IS VERY DIFFICULT
MY_BACK_PAIN_HAS_SPREAD_DOWN_MY_LEG(S)_AT_SOME_TIME_IN_THE_LAST_2_WEEKS: DISAGREE
STEPPING_UP_AND_DOWN_CURBS: SLIGHT DIFFICULTY
GIVING WAY, BUCKLING OR SHIFTING OF KNEE: I DO NOT HAVE THE SYMPTOM
KNEE_ACTIVITY_OF_DAILY_LIVING_SCORE: 72.86
SOCIAL_LIFE: PAIN HAS NO SIGNIFICANT EFFECT ON MY SOCIAL LIFE APART FROM LIMITING MY MORE ENERGETIC INTERESTS, E.G. SPORT, ETC.
GO DOWN STAIRS: ACTIVITY IS SOMEWHAT DIFFICULT
RECREATIONAL ACTIVITIES: SLIGHT DIFFICULTY
HOW_WOULD_YOU_RATE_YOUR_CURRENT_LEVEL_OF_FUNCTION?: ABNORMAL
WALKING_INITIALLY: SLIGHT DIFFICULTY
RISE FROM A CHAIR: ACTIVITY IS SOMEWHAT DIFFICULT
ROLLING_OVER_IN_BED: NO DIFFICULTY AT ALL
WALKING_DOWN_STEEP_HILLS: SLIGHT DIFFICULTY
OSWESTRY_DISABILITY_INDEX:_COUNT: 10
WORRYING_THOUGHTS_HAVE_BEEN_GOING_THROUGH_MY_MIND_A_LOT_OF_THE_TIME: DISAGREE
OSWESTRY DISABILITY INDEX_TOTAL_SCORE: 12
SPORTS_HIGHEST_POTENTIAL_SCORE: 36
SIT WITH YOUR KNEE BENT: ACTIVITY IS NOT DIFFICULT
STAND: ACTIVITY IS NOT DIFFICULT
LIFTING: I CAN LIFT HEAVY WEIGHTS WITHOUT ADDITIONAL PAIN.
LIGHT_TO_MODERATE_WORK: SLIGHT DIFFICULTY
PLEASE_INDICATE_YOR_PRIMARY_REASON_FOR_REFERRAL_TO_THERAPY:: LOWER BACK, MID BACK, AND/OR SACRUM
LIMPING: I HAVE THE SYMPTOM BUT IT DOES NOT AFFECT MY ACTIVITY
HOW_WOULD_YOU_RATE_THE_OVERALL_FUNCTION_OF_YOUR_KNEE_DURING_YOUR_USUAL_DAILY_ACTIVITIES?: ABNORMAL
PLEASE_INDICATE_YOR_PRIMARY_REASON_FOR_REFERRAL_TO_THERAPY:: KNEE
HOW_BOTHERSOME_HAS_YOUR_BACK_PAIN_BEEN_IN_THE_LAST_2_WEEKS: MODERATELY
HOS_ADL_ITEM_SCORE_TOTAL: 47
TWISTING/PIVOTING_ON_INVOLVED_LEG: NO DIFFICULTY AT ALL
HOW_WOULD_YOU_RATE_THE_OVERALL_FUNCTION_OF_YOUR_KNEE_DURING_YOUR_USUAL_DAILY_ACTIVITIES?: ABNORMAL
ABILITY_TO_PARTICIPATE_IN_YOUR_DESIRED_SPORT_AS_LONG_AS_YOU_WOULD_LIKE: SLIGHT DIFFICULTY
WALKING_UP_STEEP_HILLS: SLIGHT DIFFICULTY
ABILITY_TO_PERFORM_ACTIVITY_WITH_YOUR_NORMAL_TECHNIQUE: SLIGHT DIFFICULTY
WALKING_15_MINUTES_OR_GREATER: MODERATE DIFFICULTY
PUTTING ON SOCKS AND SHOES: NO DIFFICULTY AT ALL
TRAVELING: PAIN IS BAD BUT I AM ABLE TO MANAGE TRIPS OVER TWO HOURS.
STANDING FOR 15 MINUTES: SLIGHT DIFFICULTY
GOING UP 1 FLIGHT OF STAIRS: MODERATE DIFFICULTY
SEX_LIFE_(IF_APPLICABLE): MY SEX LIFE IS NORMAL AND CAUSES NO ADDITIONAL PAIN.
PAIN: THE SYMPTOM AFFECTS MY ACTIVITY MODERATELY

## 2025-01-30 ENCOUNTER — THERAPY VISIT (OUTPATIENT)
Dept: PHYSICAL THERAPY | Facility: REHABILITATION | Age: 80
End: 2025-01-30
Payer: COMMERCIAL

## 2025-01-30 DIAGNOSIS — M54.50 LUMBAR SPINE PAIN: ICD-10-CM

## 2025-01-30 DIAGNOSIS — M43.8X9 SAGITTAL PLANE IMBALANCE: ICD-10-CM

## 2025-01-30 DIAGNOSIS — M47.816 LUMBAR FACET ARTHROPATHY: ICD-10-CM

## 2025-01-30 DIAGNOSIS — M53.3 SACROILIAC JOINT PAIN: ICD-10-CM

## 2025-01-30 DIAGNOSIS — M41.9 SCOLIOSIS OF LUMBAR SPINE, UNSPECIFIED SCOLIOSIS TYPE: ICD-10-CM

## 2025-01-30 NOTE — PROGRESS NOTES
PHYSICAL THERAPY EVALUATION  Type of Visit: Evaluation       Fall Risk Screen:  Fall screen completed by: PT  Have you fallen 2 or more times in the past year?: No  Have you fallen and had an injury in the past year?: No  Is patient a fall risk?: No    Subjective   Daniel is presenting for evaluation of LBP that has been gradually worsened over the course of the last year. He has had primary issues with sustained forward flexion (gardening, weeding, dishes etc). Recently he has a 45 drive to [place]. L leg pain. L knee, L hip and back pain.    Some struggles getting up from a squat pattern.     Originally,  back surgery 1980 where they removed part of the disc. HE has had various conservative measures.     No recent PT and not currently doing anything at home. Treadmill: current state 2-3 times a week. Stretching helps with the walking    Pain level: 0/10  Worst pain level in the last week: 7-8/10, occurred with work bench, long drive    Recent changes in bowel or bladder  Sensory changes        Presenting condition or subjective complaint: Lower back pain at times. MRI revealed a problem that therapy may help with.  I think the hip problem and pain is coming from the back issue.  The knee is arthritis but is getting worse  Date of onset: 01/01/25    Relevant medical history: Cancer; Heart problems; Incontinence   Dates & types of surgery: Stent inserted after heart attack in 2/2017,  Prostate removal 2007, back surgery 1980    Prior diagnostic imaging/testing results: MRI; X-ray     Prior therapy history for the same diagnosis, illness or injury: No      Prior Level of Function  Transfers: Independent  Ambulation: Independent  ADL: Independent      Living Environment  Social support: With a significant other or spouse   Type of home: House; 2-story   Stairs to enter the home: Yes 3 Is there a railing: Yes     Ramp: No   Stairs inside the home: Yes 14 Is there a railing: Yes     Help at home: None  Equipment owned:        Employment: No    Hobbies/Interests: woodworking, golf    Patient goals for therapy: Do more normal activities without back pain like standing and bending. I would also like to be able to kneel down and be able to get back up without assistance from something.    Pain assessment: Pain denied     Objective   LUMBAR SPINE EVALUATION  PAIN: Pain Level with Use: 7/10    POSTURE: Sitting Posture: sacral sitting present  GAIT:   No deviation noted  BALANCE/PROPRIOCEPTION: Single Leg Stance Eyes Open (seconds): R 8 seconds L 14 seconds    ROM:   Hip ROM( ) AROM in degrees    Left Right   Hip Flexion (0-120 )     Hip Abduction (0-45 )     Hip External Rotation (0-50 )     Hip Internal Rotation (0-40 ) 15 10   Hip Extension (0-15 )     Knee flexion (120-150 )     Knee Extension (0 )      PROM in degrees    Left Right   Hip Flexion (0-120 ) To 100 Ro 110   Hip Abduction (0-45 )     Hip External Rotation (0-50 )     Hip Internal Rotation (0-40 )     Hip Extension (0-15 )     Knee flexion (120-150 )     Knee Extension (0 )     Lumbar ROM Left Right   Lumbar Side Bending To just above the knee, not much movement from lumbar spine To just above the knee, not much movement from lumbar spine   Lumbar Rotation     Lumbar Flexion 18 cm finger tip to floor   Lumbar Extension Moderate loss no pain provocation   Pain:   End feel:   STRENGTH (MYOTOMES): 5/5  */5 Left Right   Hip Flexion (L2)     Hip Extension (L3)     Hip Abduction     Hip Adduction      Hip Internal Rotation     Hip External Rotation     Knee Flexion     Knee Extension     Dorsiflexion (L4)     Great Toe Extension (L5)     Plantarflexion (S1)       DERMATOMES: WNL  FLEXIBILITY:  reduce hamstring flexibility R 45 L 30, negative SLR  LUMBAR/HIP Special Tests:   Lumbar Special Tests Left Right   Quadrant test     Straight leg raise - -   Crossover response - -   Slump     Prone instability test     Ebony's     Sit-up test    Trunk extensor endurance test    Repeated  flexion    Repeated extension    Other:    SI Tests Left Right   SI Compression     SI Distraction     POSH (Thigh Thrust)     Sacral Thrust     FADIR     LIBBY     Gaenslen's Test     Resisted Abduction     Pubic shotgun     Other:         PALPATION: elevated L abdirahman pelvis, scoliosis noted         Assessment & Plan   CLINICAL IMPRESSIONS  Medical Diagnosis: Lumbar spine pain and SI pain    Treatment Diagnosis: R LBP, L hip tightness   Impression/Assessment:  Daniel is presenting for evaluation of R>L LBP and L hip stiffness that has gradually worsened with time. HE is voicing a desire to continue managing conservatively. PT noted pelvic imbalance due to the scolisosi. We initiated education and home exercises this visit.     Clinical Decision Making (Complexity):  Clinical Presentation: Stable/Uncomplicated  Clinical Presentation Rationale: based on medical and personal factors listed in PT evaluation  Clinical Decision Making (Complexity): Low complexity    PLAN OF CARE  Treatment Interventions:  Interventions: Manual Therapy, Neuromuscular Re-education, Therapeutic Exercise    Long Term Goals     PT Goal 1  Goal Identifier: 1  Goal Description: Patient will be independent with home exercise program and self-care  Target Date: 04/30/25  PT Goal 2  Goal Identifier: 2  Goal Description: Patient will be able to walk 20 minutes for exercise, 4x/week for improvement in health and well-being  PT Goal 3  Goal Identifier: 3  Goal Description: Patient will be able to do wood working with pain <6/10 for improvement in QOL  Target Date: 04/29/25      Frequency of Treatment: 1x/week  Duration of Treatment: 90 days    Recommended Referrals to Other Professionals: none  Education Assessment:   Learner/Method: Patient    Risks and benefits of evaluation/treatment have been explained.   Patient/Family/caregiver agrees with Plan of Care.     Evaluation Time:     PT Eval, Low Complexity Minutes (46666): 20       Signing Clinician:  Mandie Mancilla, PT        [facility]                                                                                   OUTPATIENT PHYSICAL THERAPY      PLAN OF TREATMENT FOR OUTPATIENT REHABILITATION   Patient's Last Name, First Name, Daniel Eldridge YOB: 1945   Provider's Name   [facility]   Medical Record No.  [id number]     Onset Date: 01/01/25  Start of Care Date: 01/30/25     Medical Diagnosis:  Lumbar spine pain and SI pain      PT Treatment Diagnosis:  R LBP, L hip tightness Plan of Treatment  Frequency/Duration: 1x/week/ 90 days    Certification date from 01/30/25 to 04/29/25         See note for plan of treatment details and functional goals     Mandie Mancilla, PT                         I CERTIFY THE NEED FOR THESE SERVICES FURNISHED UNDER        THIS PLAN OF TREATMENT AND WHILE UNDER MY CARE     (Physician attestation of this document indicates review and certification of the therapy plan).              Referring Provider:  Quincy Greenberg    Initial Assessment  See Epic Evaluation- Start of Care Date: 01/30/25

## 2025-01-31 ASSESSMENT — ACTIVITIES OF DAILY LIVING (ADL)
SIT WITH YOUR KNEE BENT: ACTIVITY IS MINIMALLY DIFFICULT
RISE FROM A CHAIR: ACTIVITY IS MINIMALLY DIFFICULT
GETTING_INTO_AND_OUT_OF_AN_AVERAGE_CAR: SLIGHT DIFFICULTY
PLEASE_INDICATE_YOR_PRIMARY_REASON_FOR_REFERRAL_TO_THERAPY:: HIP
STIFFNESS: I HAVE THE SYMPTOM BUT IT DOES NOT AFFECT MY ACTIVITY
HOS_ADL_SCORE(%): 72.06
HOW_WOULD_YOU_RATE_THE_OVERALL_FUNCTION_OF_YOUR_KNEE_DURING_YOUR_USUAL_DAILY_ACTIVITIES?: ABNORMAL
SWELLING: I DO NOT HAVE THE SYMPTOM
SIT WITH YOUR KNEE BENT: ACTIVITY IS MINIMALLY DIFFICULT
HEAVY_WORK: SLIGHT DIFFICULTY
LOW_IMPACT_ACTIVITIES_LIKE_FAST_WALKING: MODERATE DIFFICULTY
ADL_TOTAL_ITEM_SCORE: 0
PUTTING_ON_SOCKS_AND_SHOES: NO DIFFICULTY AT ALL
WEAKNESS: THE SYMPTOM AFFECTS MY ACTIVITY SLIGHTLY
GO DOWN STAIRS: ACTIVITY IS SOMEWHAT DIFFICULT
GO UP STAIRS: ACTIVITY IS SOMEWHAT DIFFICULT
RUNNING_ONE_MILE: UNABLE TO DO
GOING_DOWN_1_FLIGHT_OF_STAIRS: MODERATE DIFFICULTY
GETTING INTO AND OUT OF AN AVERAGE CAR: SLIGHT DIFFICULTY
LIMPING: I HAVE THE SYMPTOM BUT IT DOES NOT AFFECT MY ACTIVITY
STEPPING UP AND DOWN CURBS: NO DIFFICULTY AT ALL
GETTING_INTO_AND_OUT_OF_A_BATHTUB: NO DIFFICULTY AT ALL
HOW_WOULD_YOU_RATE_YOUR_CURRENT_LEVEL_OF_FUNCTION_DURING_YOUR_USUAL_ACTIVITIES_OF_DAILY_LIVING_FROM_0_TO_100_WITH_100_BEING_YOUR_LEVEL_OF_FUNCTION_PRIOR_TO_YOUR_HIP_PROBLEM_AND_0_BEING_THE_INABILITY_TO_PERFORM_ANY_OF_YOUR_USUAL_DAILY_ACTIVITIES?: 60
IN_THE_LAST_2_WEEKS_I_HAVE_DRESSED_MORE_SLOWLY_THAN_USUAL_BECAUSE_OF_BACK_PAIN: DISAGREE
WALKING_INITIALLY: SLIGHT DIFFICULTY
I_HAVE_ONLY_WALKED_SHORT_DISTANCES_BECAUSE_OF_MY_BACK_PAIN: AGREE
ROLLING OVER IN BED: NO DIFFICULTY AT ALL
GO DOWN STAIRS: ACTIVITY IS SOMEWHAT DIFFICULT
WALKING_INITIALLY: SLIGHT DIFFICULTY
SPORTS_COUNT: 9
LIMPING: I HAVE THE SYMPTOM BUT IT DOES NOT AFFECT MY ACTIVITY
KNEEL ON THE FRONT OF YOUR KNEE: ACTIVITY IS FAIRLY DIFFICULT
GOING_UP_1_FLIGHT_OF_STAIRS: MODERATE DIFFICULTY
SITTING FOR 15 MINUTES: SLIGHT DIFFICULTY
WALK: ACTIVITY IS MINIMALLY DIFFICULT
WALKING_FOR_APPROXIMATELY_10_MINUTES: SLIGHT DIFFICULTY
HOS_ADL_HIGHEST_POTENTIAL_SCORE: 68
PLEASE_INDICATE_YOR_PRIMARY_REASON_FOR_REFERRAL_TO_THERAPY:: LOWER BACK, MID BACK, AND/OR SACRUM
STANDING FOR 15 MINUTES: SLIGHT DIFFICULTY
KNEEL ON THE FRONT OF YOUR KNEE: ACTIVITY IS FAIRLY DIFFICULT
AS_A_RESULT_OF_YOUR_KNEE_INJURY,_HOW_WOULD_YOU_RATE_YOUR_CURRENT_LEVEL_OF_DAILY_ACTIVITY?: ABNORMAL
WALKING_DOWN_STEEP_HILLS: SLIGHT DIFFICULTY
WORRYING_THOUGHTS_HAVE_BEEN_GOING_THROUGH_MY_MIND_A_LOT_OF_THE_TIME: DISAGREE
SPORTS_HIGHEST_POTENTIAL_SCORE: 36
STAND: ACTIVITY IS MINIMALLY DIFFICULT
I_HAVE_HAD_PAIN_IN_THE_SHOULDER_OR_NECK_AT_SOME_TIME_IN_THE_LAST_2_WEEKS: DISAGREE
GETTING_INTO_AND_OUT_OF_A_BATHTUB: NO DIFFICULTY AT ALL
IN_GENERAL_I_HAVE_NOT_ENJOYED_ALL_THE_THINGS_I_USED_TO_ENJOY: DISAGREE
HOW_WOULD_YOU_RATE_YOUR_CURRENT_LEVEL_OF_FUNCTION?: NEARLY NORMAL
LIGHT_TO_MODERATE_WORK: MODERATE DIFFICULTY
WALKING_15_MINUTES_OR_GREATER: MODERATE DIFFICULTY
RISE FROM A CHAIR: ACTIVITY IS MINIMALLY DIFFICULT
LANDING: SLIGHT DIFFICULTY
PLEASE_INDICATE_YOR_PRIMARY_REASON_FOR_REFERRAL_TO_THERAPY:: KNEE
WALKING_DOWN_STEEP_HILLS: SLIGHT DIFFICULTY
HEAVY_WORK: SLIGHT DIFFICULTY
KEELE ASSESSMENT OF PARTICIPATION_SUB_SCORE_(Q5-9): 0
WALKING_15_MINUTES_OR_GREATER: MODERATE DIFFICULTY
ADL_SCORE(%): 0
HOW_WOULD_YOU_RATE_YOUR_CURRENT_LEVEL_OF_FUNCTION_DURING_YOUR_SPORTS_RELATED_ACTIVITIES_FROM_0_TO_100_WITH_100_BEING_YOUR_LEVEL_OF_FUNCTION_PRIOR_TO_YOUR_HIP_PROBLEM_AND_0_BEING_THE_INABILITY_TO_PERFORM_ANY_OF_YOUR_USUAL_DAILY_ACTIVITIES?: 45
PUTTING ON SOCKS AND SHOES: NO DIFFICULTY AT ALL
JUMPING: EXTREME DIFFICULTY
HOW_WOULD_YOU_RATE_THE_CURRENT_FUNCTION_OF_YOUR_KNEE_DURING_YOUR_USUAL_DAILY_ACTIVITIES_ON_A_SCALE_FROM_0_TO_100_WITH_100_BEING_YOUR_LEVEL_OF_KNEE_FUNCTION_PRIOR_TO_YOUR_INJURY_AND_0_BEING_THE_INABILITY_TO_PERFORM_ANY_OF_YOUR_USUAL_DAILY_ACTIVITIES?: 6
ROLLING_OVER_IN_BED: NO DIFFICULTY AT ALL
WALKING_APPROXIMATELY_10_MINUTES: SLIGHT DIFFICULTY
GIVING WAY, BUCKLING OR SHIFTING OF KNEE: I HAVE THE SYMPTOM BUT IT DOES NOT AFFECT MY ACTIVITY
TWISTING/PIVOTING ON INVOLVED LEG: SLIGHT DIFFICULTY
ABILITY_TO_PARTICIPATE_IN_YOUR_DESIRED_SPORT_AS_LONG_AS_YOU_WOULD_LIKE: MODERATE DIFFICULTY
STANDING_FOR_15_MINUTES: SLIGHT DIFFICULTY
LIGHT_TO_MODERATE_WORK: MODERATE DIFFICULTY
HOW_WOULD_YOU_RATE_YOUR_CURRENT_LEVEL_OF_FUNCTION_DURING_YOUR_USUAL_ACTIVITIES_OF_DAILY_LIVING_FROM_0_TO_100_WITH_100_BEING_YOUR_LEVEL_OF_FUNCTION_PRIOR_TO_YOUR_HIP_PROBLEM_AND_0_BEING_THE_INABILITY_TO_PERFORM_ANY_OF_YOUR_USUAL_DAILY_ACTIVITIES?: 60
RAW_SCORE: 48
WALK: ACTIVITY IS MINIMALLY DIFFICULT
SWINGING_OBJECTS_LIKE_A_GOLF_CLUB: SLIGHT DIFFICULTY
CUTTING/LATERAL_MOVEMENTS: SLIGHT DIFFICULTY
WALKING_UP_STEEP_HILLS: SLIGHT DIFFICULTY
SITTING_FOR_15_MINUTES: SLIGHT DIFFICULTY
KEELE_TOTAL_SCORE: 1
GIVING WAY, BUCKLING OR SHIFTING OF KNEE: I HAVE THE SYMPTOM BUT IT DOES NOT AFFECT MY ACTIVITY
WALKING_UP_STEEP_HILLS: SLIGHT DIFFICULTY
WEAKNESS: THE SYMPTOM AFFECTS MY ACTIVITY SLIGHTLY
ADL_HIGHEST_POTENTIAL_SCORE: 68
RECREATIONAL_ACTIVITIES: SLIGHT DIFFICULTY
HOW_BOTHERSOME_HAS_YOUR_BACK_PAIN_BEEN_IN_THE_LAST_2_WEEKS: MODERATELY
DEEP SQUATTING: MODERATE DIFFICULTY
AS_A_RESULT_OF_YOUR_KNEE_INJURY,_HOW_WOULD_YOU_RATE_YOUR_CURRENT_LEVEL_OF_DAILY_ACTIVITY?: ABNORMAL
DEEP_SQUATTING: MODERATE DIFFICULTY
HOW_WOULD_YOU_RATE_THE_CURRENT_FUNCTION_OF_YOUR_KNEE_DURING_YOUR_USUAL_DAILY_ACTIVITIES_ON_A_SCALE_FROM_0_TO_100_WITH_100_BEING_YOUR_LEVEL_OF_KNEE_FUNCTION_PRIOR_TO_YOUR_INJURY_AND_0_BEING_THE_INABILITY_TO_PERFORM_ANY_OF_YOUR_USUAL_DAILY_ACTIVITIES?: 6
KNEE_ACTIVITY_OF_DAILY_LIVING_SCORE: 68.57
GOING UP 1 FLIGHT OF STAIRS: MODERATE DIFFICULTY
PAIN: THE SYMPTOM AFFECTS MY ACTIVITY MODERATELY
SPORTS_TOTAL_ITEM_SCORE: 0
SPORTS_SCORE(%): 0
STAND: ACTIVITY IS MINIMALLY DIFFICULT
STARTING_AND_STOPPING_QUICKLY: SLIGHT DIFFICULTY
TWISTING/PIVOTING_ON_INVOLVED_LEG: SLIGHT DIFFICULTY
HOW_WOULD_YOU_RATE_THE_OVERALL_FUNCTION_OF_YOUR_KNEE_DURING_YOUR_USUAL_DAILY_ACTIVITIES?: ABNORMAL
RECREATIONAL ACTIVITIES: SLIGHT DIFFICULTY
SQUAT: ACTIVITY IS FAIRLY DIFFICULT
GO UP STAIRS: ACTIVITY IS SOMEWHAT DIFFICULT
KNEE_ACTIVITY_OF_DAILY_LIVING_SUM: 48
HOS_ADL_ITEM_SCORE_TOTAL: 49
STEPPING_UP_AND_DOWN_CURBS: NO DIFFICULTY AT ALL
ABILITY_TO_PERFORM_ACTIVITY_WITH_YOUR_NORMAL_TECHNIQUE: SLIGHT DIFFICULTY
PAIN: THE SYMPTOM AFFECTS MY ACTIVITY MODERATELY
SWELLING: I DO NOT HAVE THE SYMPTOM
MY_BACK_PAIN_HAS_SPREAD_DOWN_MY_LEG(S)_AT_SOME_TIME_IN_THE_LAST_2_WEEKS: DISAGREE
STIFFNESS: I HAVE THE SYMPTOM BUT IT DOES NOT AFFECT MY ACTIVITY
SQUAT: ACTIVITY IS FAIRLY DIFFICULT
GOING DOWN 1 FLIGHT OF STAIRS: MODERATE DIFFICULTY
ADL_COUNT: 17

## 2025-02-05 ENCOUNTER — THERAPY VISIT (OUTPATIENT)
Dept: PHYSICAL THERAPY | Facility: REHABILITATION | Age: 80
End: 2025-02-05
Attending: PHYSICAL MEDICINE & REHABILITATION
Payer: COMMERCIAL

## 2025-02-05 DIAGNOSIS — M53.3 SACROILIAC JOINT PAIN: ICD-10-CM

## 2025-02-05 DIAGNOSIS — M47.816 LUMBAR FACET ARTHROPATHY: ICD-10-CM

## 2025-02-05 DIAGNOSIS — M54.50 LUMBAR SPINE PAIN: Primary | ICD-10-CM

## 2025-02-05 DIAGNOSIS — M41.9 SCOLIOSIS OF LUMBAR SPINE, UNSPECIFIED SCOLIOSIS TYPE: ICD-10-CM

## 2025-02-05 PROCEDURE — 97110 THERAPEUTIC EXERCISES: CPT | Mod: GP | Performed by: PHYSICAL THERAPIST

## 2025-02-05 PROCEDURE — 97140 MANUAL THERAPY 1/> REGIONS: CPT | Mod: GP | Performed by: PHYSICAL THERAPIST

## 2025-02-06 ENCOUNTER — MYC MEDICAL ADVICE (OUTPATIENT)
Dept: FAMILY MEDICINE | Facility: CLINIC | Age: 80
End: 2025-02-06
Payer: COMMERCIAL

## 2025-02-06 NOTE — TELEPHONE ENCOUNTER
Please review patient request for a check of hi A1C. If thi is appropriate, please place orders and route back so the patient can be contacted.    Matthias Garcia RN  [facility]

## 2025-02-12 ENCOUNTER — THERAPY VISIT (OUTPATIENT)
Dept: PHYSICAL THERAPY | Facility: REHABILITATION | Age: 80
End: 2025-02-12
Attending: PHYSICAL MEDICINE & REHABILITATION
Payer: COMMERCIAL

## 2025-02-12 ENCOUNTER — LAB (OUTPATIENT)
Dept: LAB | Facility: CLINIC | Age: 80
End: 2025-02-12
Payer: COMMERCIAL

## 2025-02-12 DIAGNOSIS — M47.816 LUMBAR FACET ARTHROPATHY: ICD-10-CM

## 2025-02-12 DIAGNOSIS — M41.9 SCOLIOSIS OF LUMBAR SPINE, UNSPECIFIED SCOLIOSIS TYPE: ICD-10-CM

## 2025-02-12 DIAGNOSIS — M54.50 LUMBAR SPINE PAIN: Primary | ICD-10-CM

## 2025-02-12 DIAGNOSIS — R73.03 PREDIABETES: ICD-10-CM

## 2025-02-12 DIAGNOSIS — M53.3 SACROILIAC JOINT PAIN: ICD-10-CM

## 2025-02-12 LAB
EST. AVERAGE GLUCOSE BLD GHB EST-MCNC: 123 MG/DL
HBA1C MFR BLD: 5.9 % (ref 0–5.6)

## 2025-02-12 PROCEDURE — 83036 HEMOGLOBIN GLYCOSYLATED A1C: CPT

## 2025-02-12 PROCEDURE — 97110 THERAPEUTIC EXERCISES: CPT | Mod: GP | Performed by: PHYSICAL THERAPIST

## 2025-02-12 PROCEDURE — 36415 COLL VENOUS BLD VENIPUNCTURE: CPT

## 2025-02-12 PROCEDURE — 97140 MANUAL THERAPY 1/> REGIONS: CPT | Mod: GP | Performed by: PHYSICAL THERAPIST

## 2025-02-12 ASSESSMENT — ACTIVITIES OF DAILY LIVING (ADL)
DEEP SQUATTING: MODERATE DIFFICULTY
GETTING_INTO_AND_OUT_OF_A_BATHTUB: NO DIFFICULTY AT ALL
SWELLING: I DO NOT HAVE THE SYMPTOM
PUTTING_ON_SOCKS_AND_SHOES: NO DIFFICULTY AT ALL
LIMPING: I HAVE THE SYMPTOM BUT IT DOES NOT AFFECT MY ACTIVITY
LIFTING: I CAN LIFT HEAVY WEIGHTS WITHOUT ADDITIONAL PAIN.
GIVING WAY, BUCKLING OR SHIFTING OF KNEE: THE SYMPTOM AFFECTS MY ACTIVITY SLIGHTLY
WALKING_UP_STEEP_HILLS: SLIGHT DIFFICULTY
SPORTS_TOTAL_ITEM_SCORE: 0
JUMPING: EXTREME DIFFICULTY
SIT WITH YOUR KNEE BENT: ACTIVITY IS MINIMALLY DIFFICULT
KEELE_TOTAL_SCORE: INCOMPLETE
STIFFNESS: I HAVE THE SYMPTOM BUT IT DOES NOT AFFECT MY ACTIVITY
AS_A_RESULT_OF_YOUR_KNEE_INJURY,_HOW_WOULD_YOU_RATE_YOUR_CURRENT_LEVEL_OF_DAILY_ACTIVITY?: ABNORMAL
GO DOWN STAIRS: ACTIVITY IS SOMEWHAT DIFFICULT
LANDING: SLIGHT DIFFICULTY
PLEASE_INDICATE_YOR_PRIMARY_REASON_FOR_REFERRAL_TO_THERAPY:: LOWER BACK, MID BACK, AND/OR SACRUM
SITTING FOR 15 MINUTES: SLIGHT DIFFICULTY
GOING_UP_1_FLIGHT_OF_STAIRS: MODERATE DIFFICULTY
SOCIAL_LIFE: PAIN HAS NO SIGNIFICANT EFFECT ON MY SOCIAL LIFE APART FROM LIMITING MY MORE ENERGETIC INTERESTS, E.G. SPORT, ETC.
GETTING INTO AND OUT OF AN AVERAGE CAR: SLIGHT DIFFICULTY
PLEASE_INDICATE_YOR_PRIMARY_REASON_FOR_REFERRAL_TO_THERAPY:: KNEE
GOING UP 1 FLIGHT OF STAIRS: MODERATE DIFFICULTY
RAW_SCORE: 47
WALKING_DOWN_STEEP_HILLS: SLIGHT DIFFICULTY
WALKING_INITIALLY: SLIGHT DIFFICULTY
KNEE_ACTIVITY_OF_DAILY_LIVING_SUM: 47
ADL_TOTAL_ITEM_SCORE: 0
HEAVY_WORK: SLIGHT DIFFICULTY
SITTING: I CAN SIT IN MY FAVORITE CHAIR AS LONG AS I LIKE.
GO DOWN STAIRS: ACTIVITY IS SOMEWHAT DIFFICULT
SQUAT: ACTIVITY IS FAIRLY DIFFICULT
ADL_SCORE(%): 0
ROLLING_OVER_IN_BED: NO DIFFICULTY AT ALL
SECTION_4-WALKING: PAIN PREVENTS ME FROM WALKING MORE THAN ONE MILE.
HEAVY_WORK: SLIGHT DIFFICULTY
GETTING_INTO_AND_OUT_OF_AN_AVERAGE_CAR: SLIGHT DIFFICULTY
PAIN: THE SYMPTOM AFFECTS MY ACTIVITY SLIGHTLY
OSWESTRY_DISABILITY_INDEX:_COUNT: 10
GOING_DOWN_1_FLIGHT_OF_STAIRS: MODERATE DIFFICULTY
KNEE_ACTIVITY_OF_DAILY_LIVING_SCORE: 67.14
GETTING_INTO_AND_OUT_OF_A_BATHTUB: NO DIFFICULTY AT ALL
PAIN: THE SYMPTOM AFFECTS MY ACTIVITY SLIGHTLY
CUTTING/LATERAL_MOVEMENTS: SLIGHT DIFFICULTY
GO UP STAIRS: ACTIVITY IS SOMEWHAT DIFFICULT
WALKING_INITIALLY: SLIGHT DIFFICULTY
STEPPING_UP_AND_DOWN_CURBS: NO DIFFICULTY AT ALL
KNEEL ON THE FRONT OF YOUR KNEE: ACTIVITY IS FAIRLY DIFFICULT
STANDING FOR 15 MINUTES: SLIGHT DIFFICULTY
PERSONAL_CARE: I CAN LOOK AFTER MYSELF NORMALLY WITHOUT CAUSING ADDITIONAL PAIN.
SWELLING: I DO NOT HAVE THE SYMPTOM
HOS_ADL_SCORE(%): 72.06
WEAKNESS: THE SYMPTOM AFFECTS MY ACTIVITY MODERATELY
COMPUTED_OSWESTRY_SCORE: 20
RISE FROM A CHAIR: ACTIVITY IS MINIMALLY DIFFICULT
OSWESTRY DISABILITY INDEX_TOTAL_SCORE: 10
LIMPING: I HAVE THE SYMPTOM BUT IT DOES NOT AFFECT MY ACTIVITY
STANDING: PAIN PREVENTS ME FROM STANDING FOR MORE THAN HALF AN HOUR.
HOW_WOULD_YOU_RATE_YOUR_CURRENT_LEVEL_OF_FUNCTION_DURING_YOUR_SPORTS_RELATED_ACTIVITIES_FROM_0_TO_100_WITH_100_BEING_YOUR_LEVEL_OF_FUNCTION_PRIOR_TO_YOUR_HIP_PROBLEM_AND_0_BEING_THE_INABILITY_TO_PERFORM_ANY_OF_YOUR_USUAL_DAILY_ACTIVITIES?: 50
KEELE ASSESSMENT OF PARTICIPATION_SUB_SCORE_(Q5-9): INCOMPLETE
IN_THE_LAST_2_WEEKS_I_HAVE_DRESSED_MORE_SLOWLY_THAN_USUAL_BECAUSE_OF_BACK_PAIN: DISAGREE
HOS_ADL_HIGHEST_POTENTIAL_SCORE: 68
PLEASE_INDICATE_YOR_PRIMARY_REASON_FOR_REFERRAL_TO_THERAPY:: HIP
STEPPING UP AND DOWN CURBS: NO DIFFICULTY AT ALL
GO UP STAIRS: ACTIVITY IS SOMEWHAT DIFFICULT
WEAKNESS: THE SYMPTOM AFFECTS MY ACTIVITY MODERATELY
GOING DOWN 1 FLIGHT OF STAIRS: MODERATE DIFFICULTY
STIFFNESS: I HAVE THE SYMPTOM BUT IT DOES NOT AFFECT MY ACTIVITY
MY_BACK_PAIN_HAS_SPREAD_DOWN_MY_LEG(S)_AT_SOME_TIME_IN_THE_LAST_2_WEEKS: DISAGREE
GIVING WAY, BUCKLING OR SHIFTING OF KNEE: THE SYMPTOM AFFECTS MY ACTIVITY SLIGHTLY
SPORTS_HIGHEST_POTENTIAL_SCORE: 36
ADL_HIGHEST_POTENTIAL_SCORE: 68
LIGHT_TO_MODERATE_WORK: MODERATE DIFFICULTY
STAND: ACTIVITY IS MINIMALLY DIFFICULT
WALKING_FOR_APPROXIMATELY_10_MINUTES: SLIGHT DIFFICULTY
LOW_IMPACT_ACTIVITIES_LIKE_FAST_WALKING: MODERATE DIFFICULTY
WALKING_APPROXIMATELY_10_MINUTES: SLIGHT DIFFICULTY
DEEP_SQUATTING: MODERATE DIFFICULTY
HOW_WOULD_YOU_RATE_YOUR_CURRENT_LEVEL_OF_FUNCTION_DURING_YOUR_USUAL_ACTIVITIES_OF_DAILY_LIVING_FROM_0_TO_100_WITH_100_BEING_YOUR_LEVEL_OF_FUNCTION_PRIOR_TO_YOUR_HIP_PROBLEM_AND_0_BEING_THE_INABILITY_TO_PERFORM_ANY_OF_YOUR_USUAL_DAILY_ACTIVITIES?: 60
PAIN_INTENSITY: THE PAIN IS VERY MILD AT THE MOMENT.
SPORTS_SCORE(%): 0
SQUAT: ACTIVITY IS FAIRLY DIFFICULT
KNEEL ON THE FRONT OF YOUR KNEE: ACTIVITY IS FAIRLY DIFFICULT
STARTING_AND_STOPPING_QUICKLY: SLIGHT DIFFICULTY
HOW_WOULD_YOU_RATE_THE_OVERALL_FUNCTION_OF_YOUR_KNEE_DURING_YOUR_USUAL_DAILY_ACTIVITIES?: ABNORMAL
SIT WITH YOUR KNEE BENT: ACTIVITY IS MINIMALLY DIFFICULT
HOS_ADL_ITEM_SCORE_TOTAL: 49
RISE FROM A CHAIR: ACTIVITY IS MINIMALLY DIFFICULT
HOW_WOULD_YOU_RATE_THE_CURRENT_FUNCTION_OF_YOUR_KNEE_DURING_YOUR_USUAL_DAILY_ACTIVITIES_ON_A_SCALE_FROM_0_TO_100_WITH_100_BEING_YOUR_LEVEL_OF_KNEE_FUNCTION_PRIOR_TO_YOUR_INJURY_AND_0_BEING_THE_INABILITY_TO_PERFORM_ANY_OF_YOUR_USUAL_DAILY_ACTIVITIES?: 50
SLEEPING: MY SLEEP IS NEVER INTERRUPTED BY PAIN.
HOW_WOULD_YOU_RATE_THE_OVERALL_FUNCTION_OF_YOUR_KNEE_DURING_YOUR_USUAL_DAILY_ACTIVITIES?: ABNORMAL
TWISTING/PIVOTING_ON_INVOLVED_LEG: SLIGHT DIFFICULTY
LIGHT_TO_MODERATE_WORK: MODERATE DIFFICULTY
ADL_COUNT: 17
STAND: ACTIVITY IS MINIMALLY DIFFICULT
I_HAVE_HAD_PAIN_IN_THE_SHOULDER_OR_NECK_AT_SOME_TIME_IN_THE_LAST_2_WEEKS: DISAGREE
WORRYING_THOUGHTS_HAVE_BEEN_GOING_THROUGH_MY_MIND_A_LOT_OF_THE_TIME: DISAGREE
I_HAVE_ONLY_WALKED_SHORT_DISTANCES_BECAUSE_OF_MY_BACK_PAIN: AGREE
WALKING_DOWN_STEEP_HILLS: SLIGHT DIFFICULTY
SITTING_FOR_15_MINUTES: SLIGHT DIFFICULTY
IN_GENERAL_I_HAVE_NOT_ENJOYED_ALL_THE_THINGS_I_USED_TO_ENJOY: DISAGREE
WALKING_15_MINUTES_OR_GREATER: MODERATE DIFFICULTY
HOW_WOULD_YOU_RATE_YOUR_CURRENT_LEVEL_OF_FUNCTION_DURING_YOUR_USUAL_ACTIVITIES_OF_DAILY_LIVING_FROM_0_TO_100_WITH_100_BEING_YOUR_LEVEL_OF_FUNCTION_PRIOR_TO_YOUR_HIP_PROBLEM_AND_0_BEING_THE_INABILITY_TO_PERFORM_ANY_OF_YOUR_USUAL_DAILY_ACTIVITIES?: 60
TWISTING/PIVOTING ON INVOLVED LEG: SLIGHT DIFFICULTY
AS_A_RESULT_OF_YOUR_KNEE_INJURY,_HOW_WOULD_YOU_RATE_YOUR_CURRENT_LEVEL_OF_DAILY_ACTIVITY?: ABNORMAL
HOW_BOTHERSOME_HAS_YOUR_BACK_PAIN_BEEN_IN_THE_LAST_2_WEEKS: MODERATELY
PUTTING ON SOCKS AND SHOES: NO DIFFICULTY AT ALL
STANDING_FOR_15_MINUTES: SLIGHT DIFFICULTY
RECREATIONAL_ACTIVITIES: SLIGHT DIFFICULTY
ABILITY_TO_PERFORM_ACTIVITY_WITH_YOUR_NORMAL_TECHNIQUE: SLIGHT DIFFICULTY
TRAVELING: PAIN IS BAD BUT I AM ABLE TO MANAGE TRIPS OVER TWO HOURS.
WALKING_15_MINUTES_OR_GREATER: MODERATE DIFFICULTY
WALKING_UP_STEEP_HILLS: SLIGHT DIFFICULTY
SPORTS_COUNT: 9
WALK: ACTIVITY IS MINIMALLY DIFFICULT
RUNNING_ONE_MILE: UNABLE TO DO
WALK: ACTIVITY IS MINIMALLY DIFFICULT
HOW_WOULD_YOU_RATE_THE_CURRENT_FUNCTION_OF_YOUR_KNEE_DURING_YOUR_USUAL_DAILY_ACTIVITIES_ON_A_SCALE_FROM_0_TO_100_WITH_100_BEING_YOUR_LEVEL_OF_KNEE_FUNCTION_PRIOR_TO_YOUR_INJURY_AND_0_BEING_THE_INABILITY_TO_PERFORM_ANY_OF_YOUR_USUAL_DAILY_ACTIVITIES?: 50
SWINGING_OBJECTS_LIKE_A_GOLF_CLUB: SLIGHT DIFFICULTY
ABILITY_TO_PARTICIPATE_IN_YOUR_DESIRED_SPORT_AS_LONG_AS_YOU_WOULD_LIKE: MODERATE DIFFICULTY
RECREATIONAL ACTIVITIES: SLIGHT DIFFICULTY
ROLLING OVER IN BED: NO DIFFICULTY AT ALL
SEX_LIFE_(IF_APPLICABLE): MY SEX LIFE IS NORMAL AND CAUSES NO ADDITIONAL PAIN.

## 2025-02-18 ASSESSMENT — ACTIVITIES OF DAILY LIVING (ADL)
STANDING FOR 15 MINUTES: SLIGHT DIFFICULTY
RAW_SCORE: 46
STIFFNESS: I HAVE THE SYMPTOM BUT IT DOES NOT AFFECT MY ACTIVITY
RISE FROM A CHAIR: ACTIVITY IS MINIMALLY DIFFICULT
WORRYING_THOUGHTS_HAVE_BEEN_GOING_THROUGH_MY_MIND_A_LOT_OF_THE_TIME: DISAGREE
ABILITY_TO_PERFORM_ACTIVITY_WITH_YOUR_NORMAL_TECHNIQUE: SLIGHT DIFFICULTY
HOW_WOULD_YOU_RATE_YOUR_CURRENT_LEVEL_OF_FUNCTION_DURING_YOUR_SPORTS_RELATED_ACTIVITIES_FROM_0_TO_100_WITH_100_BEING_YOUR_LEVEL_OF_FUNCTION_PRIOR_TO_YOUR_HIP_PROBLEM_AND_0_BEING_THE_INABILITY_TO_PERFORM_ANY_OF_YOUR_USUAL_DAILY_ACTIVITIES?: 50
KNEEL ON THE FRONT OF YOUR KNEE: ACTIVITY IS FAIRLY DIFFICULT
SPORTS_COUNT: 9
SITTING FOR 15 MINUTES: SLIGHT DIFFICULTY
TWISTING/PIVOTING ON INVOLVED LEG: SLIGHT DIFFICULTY
WALKING_INITIALLY: SLIGHT DIFFICULTY
PLEASE_INDICATE_YOR_PRIMARY_REASON_FOR_REFERRAL_TO_THERAPY:: LOWER BACK, MID BACK, AND/OR SACRUM
WALKING_DOWN_STEEP_HILLS: SLIGHT DIFFICULTY
HOW_WOULD_YOU_RATE_YOUR_CURRENT_LEVEL_OF_FUNCTION?: ABNORMAL
GETTING_INTO_AND_OUT_OF_A_BATHTUB: NO DIFFICULTY AT ALL
WALKING_15_MINUTES_OR_GREATER: SLIGHT DIFFICULTY
MY_BACK_PAIN_HAS_SPREAD_DOWN_MY_LEG(S)_AT_SOME_TIME_IN_THE_LAST_2_WEEKS: DISAGREE
STEPPING_UP_AND_DOWN_CURBS: NO DIFFICULTY AT ALL
WALKING_INITIALLY: SLIGHT DIFFICULTY
PLEASE_INDICATE_YOR_PRIMARY_REASON_FOR_REFERRAL_TO_THERAPY:: KNEE
SPORTS_TOTAL_ITEM_SCORE: 0
SWINGING_OBJECTS_LIKE_A_GOLF_CLUB: SLIGHT DIFFICULTY
GOING DOWN 1 FLIGHT OF STAIRS: SLIGHT DIFFICULTY
HOW_WOULD_YOU_RATE_THE_OVERALL_FUNCTION_OF_YOUR_KNEE_DURING_YOUR_USUAL_DAILY_ACTIVITIES?: ABNORMAL
GETTING INTO AND OUT OF AN AVERAGE CAR: SLIGHT DIFFICULTY
LIMPING: I HAVE THE SYMPTOM BUT IT DOES NOT AFFECT MY ACTIVITY
ADL_COUNT: 17
HOS_ADL_ITEM_SCORE_TOTAL: 51
HOS_ADL_SCORE(%): 75
ROLLING_OVER_IN_BED: NO DIFFICULTY AT ALL
PAIN: THE SYMPTOM AFFECTS MY ACTIVITY SLIGHTLY
GO UP STAIRS: ACTIVITY IS SOMEWHAT DIFFICULT
GOING UP 1 FLIGHT OF STAIRS: MODERATE DIFFICULTY
KNEE_ACTIVITY_OF_DAILY_LIVING_SCORE: 65.71
RECREATIONAL_ACTIVITIES: SLIGHT DIFFICULTY
WALKING_APPROXIMATELY_10_MINUTES: SLIGHT DIFFICULTY
TWISTING/PIVOTING_ON_INVOLVED_LEG: SLIGHT DIFFICULTY
LIGHT_TO_MODERATE_WORK: MODERATE DIFFICULTY
HOW_WOULD_YOU_RATE_YOUR_CURRENT_LEVEL_OF_FUNCTION_DURING_YOUR_USUAL_ACTIVITIES_OF_DAILY_LIVING_FROM_0_TO_100_WITH_100_BEING_YOUR_LEVEL_OF_FUNCTION_PRIOR_TO_YOUR_HIP_PROBLEM_AND_0_BEING_THE_INABILITY_TO_PERFORM_ANY_OF_YOUR_USUAL_DAILY_ACTIVITIES?: 60
ROLLING OVER IN BED: NO DIFFICULTY AT ALL
HOW_WOULD_YOU_RATE_THE_CURRENT_FUNCTION_OF_YOUR_KNEE_DURING_YOUR_USUAL_DAILY_ACTIVITIES_ON_A_SCALE_FROM_0_TO_100_WITH_100_BEING_YOUR_LEVEL_OF_KNEE_FUNCTION_PRIOR_TO_YOUR_INJURY_AND_0_BEING_THE_INABILITY_TO_PERFORM_ANY_OF_YOUR_USUAL_DAILY_ACTIVITIES?: 50
STARTING_AND_STOPPING_QUICKLY: SLIGHT DIFFICULTY
AS_A_RESULT_OF_YOUR_KNEE_INJURY,_HOW_WOULD_YOU_RATE_YOUR_CURRENT_LEVEL_OF_DAILY_ACTIVITY?: ABNORMAL
SPORTS_SCORE(%): 0
STAND: ACTIVITY IS SOMEWHAT DIFFICULT
WEAKNESS: THE SYMPTOM AFFECTS MY ACTIVITY MODERATELY
WALKING_DOWN_STEEP_HILLS: SLIGHT DIFFICULTY
WALKING_UP_STEEP_HILLS: SLIGHT DIFFICULTY
GETTING_INTO_AND_OUT_OF_A_BATHTUB: NO DIFFICULTY AT ALL
IN_GENERAL_I_HAVE_NOT_ENJOYED_ALL_THE_THINGS_I_USED_TO_ENJOY: AGREE
GIVING WAY, BUCKLING OR SHIFTING OF KNEE: THE SYMPTOM AFFECTS MY ACTIVITY SLIGHTLY
SITTING_FOR_15_MINUTES: SLIGHT DIFFICULTY
WALK: ACTIVITY IS MINIMALLY DIFFICULT
RISE FROM A CHAIR: ACTIVITY IS MINIMALLY DIFFICULT
GETTING_INTO_AND_OUT_OF_AN_AVERAGE_CAR: SLIGHT DIFFICULTY
GIVING WAY, BUCKLING OR SHIFTING OF KNEE: THE SYMPTOM AFFECTS MY ACTIVITY SLIGHTLY
STAND: ACTIVITY IS SOMEWHAT DIFFICULT
RECREATIONAL ACTIVITIES: SLIGHT DIFFICULTY
STANDING_FOR_15_MINUTES: SLIGHT DIFFICULTY
DEEP SQUATTING: MODERATE DIFFICULTY
HOW_WOULD_YOU_RATE_THE_CURRENT_FUNCTION_OF_YOUR_KNEE_DURING_YOUR_USUAL_DAILY_ACTIVITIES_ON_A_SCALE_FROM_0_TO_100_WITH_100_BEING_YOUR_LEVEL_OF_KNEE_FUNCTION_PRIOR_TO_YOUR_INJURY_AND_0_BEING_THE_INABILITY_TO_PERFORM_ANY_OF_YOUR_USUAL_DAILY_ACTIVITIES?: 50
HOW_BOTHERSOME_HAS_YOUR_BACK_PAIN_BEEN_IN_THE_LAST_2_WEEKS: MODERATELY
SQUAT: ACTIVITY IS FAIRLY DIFFICULT
HOW_WOULD_YOU_RATE_THE_OVERALL_FUNCTION_OF_YOUR_KNEE_DURING_YOUR_USUAL_DAILY_ACTIVITIES?: ABNORMAL
PAIN: THE SYMPTOM AFFECTS MY ACTIVITY SLIGHTLY
SWELLING: I DO NOT HAVE THE SYMPTOM
GO DOWN STAIRS: ACTIVITY IS SOMEWHAT DIFFICULT
SWELLING: I DO NOT HAVE THE SYMPTOM
PUTTING_ON_SOCKS_AND_SHOES: NO DIFFICULTY AT ALL
I_HAVE_HAD_PAIN_IN_THE_SHOULDER_OR_NECK_AT_SOME_TIME_IN_THE_LAST_2_WEEKS: DISAGREE
WALKING_15_MINUTES_OR_GREATER: SLIGHT DIFFICULTY
WALKING_FOR_APPROXIMATELY_10_MINUTES: SLIGHT DIFFICULTY
GOING_DOWN_1_FLIGHT_OF_STAIRS: SLIGHT DIFFICULTY
PUTTING ON SOCKS AND SHOES: NO DIFFICULTY AT ALL
ABILITY_TO_PARTICIPATE_IN_YOUR_DESIRED_SPORT_AS_LONG_AS_YOU_WOULD_LIKE: MODERATE DIFFICULTY
I_HAVE_ONLY_WALKED_SHORT_DISTANCES_BECAUSE_OF_MY_BACK_PAIN: DISAGREE
ADL_SCORE(%): 0
LANDING: SLIGHT DIFFICULTY
DEEP_SQUATTING: MODERATE DIFFICULTY
LIGHT_TO_MODERATE_WORK: MODERATE DIFFICULTY
SIT WITH YOUR KNEE BENT: ACTIVITY IS MINIMALLY DIFFICULT
AS_A_RESULT_OF_YOUR_KNEE_INJURY,_HOW_WOULD_YOU_RATE_YOUR_CURRENT_LEVEL_OF_DAILY_ACTIVITY?: ABNORMAL
ADL_HIGHEST_POTENTIAL_SCORE: 68
STEPPING UP AND DOWN CURBS: NO DIFFICULTY AT ALL
RUNNING_ONE_MILE: UNABLE TO DO
KNEEL ON THE FRONT OF YOUR KNEE: ACTIVITY IS FAIRLY DIFFICULT
SIT WITH YOUR KNEE BENT: ACTIVITY IS MINIMALLY DIFFICULT
HOW_WOULD_YOU_RATE_YOUR_CURRENT_LEVEL_OF_FUNCTION_DURING_YOUR_USUAL_ACTIVITIES_OF_DAILY_LIVING_FROM_0_TO_100_WITH_100_BEING_YOUR_LEVEL_OF_FUNCTION_PRIOR_TO_YOUR_HIP_PROBLEM_AND_0_BEING_THE_INABILITY_TO_PERFORM_ANY_OF_YOUR_USUAL_DAILY_ACTIVITIES?: 60
HEAVY_WORK: SLIGHT DIFFICULTY
WALKING_UP_STEEP_HILLS: SLIGHT DIFFICULTY
SPORTS_HIGHEST_POTENTIAL_SCORE: 36
GO UP STAIRS: ACTIVITY IS SOMEWHAT DIFFICULT
GOING_UP_1_FLIGHT_OF_STAIRS: MODERATE DIFFICULTY
IN_THE_LAST_2_WEEKS_I_HAVE_DRESSED_MORE_SLOWLY_THAN_USUAL_BECAUSE_OF_BACK_PAIN: DISAGREE
GO DOWN STAIRS: ACTIVITY IS SOMEWHAT DIFFICULT
KEELE_TOTAL_SCORE: 1
LOW_IMPACT_ACTIVITIES_LIKE_FAST_WALKING: SLIGHT DIFFICULTY
JUMPING: EXTREME DIFFICULTY
ADL_TOTAL_ITEM_SCORE: 0
LIMPING: I HAVE THE SYMPTOM BUT IT DOES NOT AFFECT MY ACTIVITY
KEELE ASSESSMENT OF PARTICIPATION_SUB_SCORE_(Q5-9): 1
WEAKNESS: THE SYMPTOM AFFECTS MY ACTIVITY MODERATELY
WALK: ACTIVITY IS MINIMALLY DIFFICULT
PLEASE_INDICATE_YOR_PRIMARY_REASON_FOR_REFERRAL_TO_THERAPY:: HIP
STIFFNESS: I HAVE THE SYMPTOM BUT IT DOES NOT AFFECT MY ACTIVITY
HOS_ADL_HIGHEST_POTENTIAL_SCORE: 68
CUTTING/LATERAL_MOVEMENTS: SLIGHT DIFFICULTY
KNEE_ACTIVITY_OF_DAILY_LIVING_SUM: 46
HEAVY_WORK: SLIGHT DIFFICULTY
SQUAT: ACTIVITY IS FAIRLY DIFFICULT

## 2025-02-19 ENCOUNTER — THERAPY VISIT (OUTPATIENT)
Dept: PHYSICAL THERAPY | Facility: REHABILITATION | Age: 80
End: 2025-02-19
Attending: PHYSICAL MEDICINE & REHABILITATION
Payer: COMMERCIAL

## 2025-02-19 DIAGNOSIS — M47.816 LUMBAR FACET ARTHROPATHY: ICD-10-CM

## 2025-02-19 DIAGNOSIS — M53.3 SACROILIAC JOINT PAIN: ICD-10-CM

## 2025-02-19 DIAGNOSIS — M54.50 LUMBAR SPINE PAIN: Primary | ICD-10-CM

## 2025-02-19 PROCEDURE — 97110 THERAPEUTIC EXERCISES: CPT | Mod: GP | Performed by: PHYSICAL THERAPIST

## 2025-02-19 PROCEDURE — 97140 MANUAL THERAPY 1/> REGIONS: CPT | Mod: GP | Performed by: PHYSICAL THERAPIST

## 2025-03-03 NOTE — PROGRESS NOTES
Assessment/Plan:      Daniel was seen today for back pain.    Diagnoses and all orders for this visit:    Lumbar spine pain    Lumbar facet arthropathy    Foraminal stenosis of lumbar region    Scoliosis of lumbar spine, unspecified scoliosis type    Sagittal plane imbalance         Assessment: Pleasant 79 year old male with a history of hypertension, Coronary artery disease prostate cancer with:     1.  Chronic lumbar spine pain lumbosacral junction SI region.  Most consistent with facet arthropathy and SI pain in the setting of lumbar scoliotic curve with likely sagittal plane imbalance.  History of lumbar decompression.  Multilevel lumbar degenerative disc disease with facet arthropathy on the right at L5-S1 L4-5 in the setting of scoliosis.  Has severe right foraminal stenosis L5-S1 and some of his right iliac crest pain could be proximal radicular more likely facet mediated.  He does have approximation of the transverse process of L5 with the sacrum on the right as well.  Has had mild positive thigh thrust Lobito's bilaterally for SI pain and Gaenslen's on the left.  Some improvement with physical therapy.  Now having a little bit more pain with driving but typically range of motion and activity tolerance has improved.     2.  Myofascial pain lumbar spine and gluteal region.     3.  Decreased range of motion of the hips.  Likely capsular tightness and mild degenerative changes.         Discussion:    1.  We discussed his progress in physical therapy.  He has significant facet arthropathy in the right at L5-S1 L4-5 as well as some approximation of the transverse process of L5 at the sacrum and severe right foraminal stenosis.  Some of his pain could be proximal radicular.  His left gluteal pain has improved with therapy which is good we discussed options of continuing therapy, medications, injections.  Wants to continue conservative management.    2.   Continue plan of physical therapy through his completion home  exercise.    3.  Try Tylenol as needed for pain.    4.  He wants to try golfing and I did discuss some options to prerotate his right hip before swinging to see if that helps.    5.  Can consider medial branch blocks versus TFESI in the future.    6.  Follow-up 3 months.      It was our pleasure caring for your patient today, if there any questions or concerns please do not hesitate to contact us.      Subjective:   Patient ID: aDniel Jacobs is a 79 year old male.    History of Present Illness: Patient presents for follow-up of lumbar spine pain left gluteal pain.  Left gluteal pain is doing quite well after physical therapy and his back pain is doing better with therapy as well.  Having improved range of motion of the back improved activity tolerance but driving has become an issue at times trying to adjust his seat.  He has pain across the right iliac crest and from the PSIS nothing down the leg.  Worse with driving standing still.  Better with moving laying down massage.  Pain is an 8/10 at worst but most the time is 0-1/10.  Doing his home exercises regularly and keeping a log of when he is able to do the exercises working with Mandie.      Imaging: MRI lumbar spine from November 2024 reviewed again today.  This reveals broad-based disc bulges throughout the lumbar spine resulting in mild central stenosis L4-5 moderate central stenosis L3-4.  L5-S1 there is moderate right and mild left foraminal stenosis postsurgical changes with mild central stenosis.  Most significant stenosis on my review is L3-4 and L2-3 with right lateral recess stenosis at L3-4 most severe with significant right facet arthropathy.There is facet arthropathy at L3-4 L4-5 and more significant L5-S1.    I discussed the CT abdomen and pelvis as well from 2024 showing scoliosis.  Severe right facet arthropathy L5-S1 L4-5 with approximation of the right L5 transverse process with the sacrum and degenerative changes and spurring.  Severe right  foraminal stenosis L5-S1.    Review of Systems: Pertinent positives: None.  Pertinent negatives: No numbness, tingling or weakness.  No bowel or bladder incontinence.  No urinary retention.  No fevers, unintentional weight loss, balance changes, headaches, frequent falling, difficulty swallowing, or coordination difficulties.  All others reviewed are negative.    Current Outpatient Medications   Medication Sig Dispense Refill    aspirin 81 MG EC tablet Take 1 tablet (81 mg) by mouth daily      atorvastatin (LIPITOR) 80 MG tablet Take 1 tablet (80 mg) by mouth daily 90 tablet 3    gabapentin (NEURONTIN) 100 MG capsule Take 1-3 capsules (100-300 mg) by mouth 3 times daily as needed. 60 capsule 0    ketoconazole (NIZORAL) 2 % external cream Apply topically as needed      LUTEIN EXTRACT-ZEAXANTHIN EXT ORAL [LUTEIN EXTRACT-ZEAXANTHIN EXT ORAL] Take 1 tablet by mouth daily.      metoprolol tartrate (LOPRESSOR) 25 MG tablet Take 0.5 tablets (12.5 mg) by mouth 2 times daily 90 tablet 3    nitroGLYcerin (NITROSTAT) 0.4 MG sublingual tablet Place 1 tablet (0.4 mg) under the tongue every 5 minutes as needed for chest pain For chest pain place 1 tablet under the tongue every 5 minutes for 3 doses. If symptoms persist 5 minutes after 1st dose call 911. 25 tablet 0     No current facility-administered medications for this visit.       Past Medical History:   Diagnosis Date    Cardiac abnormality     Low back pain     Prostate cancer (H) 2007    Superficial thrombosis of right lower extremity 05/17/2023       The following portions of the patient's history were reviewed and updated as appropriate: allergies, current medications, past family history, past medical history, past social history, past surgical history and problem list.           Objective:   Physical Exam:    /59   Pulse 75   There is no height or weight on file to calculate BMI.      General: Alert and oriented with normal affect. Attention, knowledge, memory,  and language are intact. No acute distress.   Eyes: Sclerae are clear.  Respirations: Unlabored. CV: No lower extremity edema.     Gait:  Nonantalgic  Tenderness over the right PSIS.  Tightness with right facet loading  Sensation is intact to light touch throughout the unit(s)  lower extremities.  Reflexes are  2+ patellar and Achilles with downgoing toes.    Manual muscle testing reveals:  Right /Left out of 5     5/5 hip flexors  5/5 knee flexors  5/5 knee extensors  5/5 ankle plantar flexors  5/5 ankle dorsiflexors  5/5  ankle evertors

## 2025-03-04 ENCOUNTER — OFFICE VISIT (OUTPATIENT)
Dept: PHYSICAL MEDICINE AND REHAB | Facility: CLINIC | Age: 80
End: 2025-03-04
Payer: COMMERCIAL

## 2025-03-04 VITALS — HEART RATE: 75 BPM | SYSTOLIC BLOOD PRESSURE: 119 MMHG | DIASTOLIC BLOOD PRESSURE: 59 MMHG

## 2025-03-04 DIAGNOSIS — M48.061 FORAMINAL STENOSIS OF LUMBAR REGION: ICD-10-CM

## 2025-03-04 DIAGNOSIS — M54.50 LUMBAR SPINE PAIN: Primary | ICD-10-CM

## 2025-03-04 DIAGNOSIS — M41.9 SCOLIOSIS OF LUMBAR SPINE, UNSPECIFIED SCOLIOSIS TYPE: ICD-10-CM

## 2025-03-04 DIAGNOSIS — M47.816 LUMBAR FACET ARTHROPATHY: ICD-10-CM

## 2025-03-04 DIAGNOSIS — M43.8X9 SAGITTAL PLANE IMBALANCE: ICD-10-CM

## 2025-03-04 PROCEDURE — 3074F SYST BP LT 130 MM HG: CPT | Performed by: PHYSICAL MEDICINE & REHABILITATION

## 2025-03-04 PROCEDURE — 1125F AMNT PAIN NOTED PAIN PRSNT: CPT | Performed by: PHYSICAL MEDICINE & REHABILITATION

## 2025-03-04 PROCEDURE — 99213 OFFICE O/P EST LOW 20 MIN: CPT | Performed by: PHYSICAL MEDICINE & REHABILITATION

## 2025-03-04 PROCEDURE — 3078F DIAST BP <80 MM HG: CPT | Performed by: PHYSICAL MEDICINE & REHABILITATION

## 2025-03-04 ASSESSMENT — PAIN SCALES - GENERAL: PAINLEVEL_OUTOF10: MILD PAIN (1)

## 2025-03-04 NOTE — LETTER
3/4/2025      Daniel Jacobs  [address] [place] [id number]      Dear Colleague,    Thank you for referring your patient, Daniel Jacobs, to the [facility]. Please see a copy of my visit note below.    Assessment/Plan:      Daniel was seen today for back pain.    Diagnoses and all orders for this visit:    Lumbar spine pain    Lumbar facet arthropathy    Foraminal stenosis of lumbar region    Scoliosis of lumbar spine, unspecified scoliosis type    Sagittal plane imbalance         Assessment: Pleasant 79 year old male with a history of hypertension, Coronary artery disease prostate cancer with:     1.  Chronic lumbar spine pain lumbosacral junction SI region.  Most consistent with facet arthropathy and SI pain in the setting of lumbar scoliotic curve with likely sagittal plane imbalance.  History of lumbar decompression.  Multilevel lumbar degenerative disc disease with facet arthropathy on the right at L5-S1 L4-5 in the setting of scoliosis.  Has severe right foraminal stenosis L5-S1 and some of his right iliac crest pain could be proximal radicular more likely facet mediated.  He does have approximation of the transverse process of L5 with the sacrum on the right as well.  Has had mild positive thigh thrust Lobito's bilaterally for SI pain and Gaenslen's on the left.  Some improvement with physical therapy.  Now having a little bit more pain with driving but typically range of motion and activity tolerance has improved.     2.  Myofascial pain lumbar spine and gluteal region.     3.  Decreased range of motion of the hips.  Likely capsular tightness and mild degenerative changes.         Discussion:    1.  We discussed his progress in physical therapy.  He has significant facet arthropathy in the right at L5-S1 L4-5 as well as some approximation of the transverse process of L5 at the sacrum and severe right foraminal stenosis.  Some of his pain could be proximal radicular.   His left gluteal pain has improved with therapy which is good we discussed options of continuing therapy, medications, injections.  Wants to continue conservative management.    2.   Continue plan of physical therapy through his completion home exercise.    3.  Try Tylenol as needed for pain.    4.  He wants to try golfing and I did discuss some options to prerotate his right hip before swinging to see if that helps.    5.  Can consider medial branch blocks versus TFESI in the future.    6.  Follow-up 3 months.      It was our pleasure caring for your patient today, if there any questions or concerns please do not hesitate to contact us.      Subjective:   Patient ID: Daniel Jacobs is a 79 year old male.    History of Present Illness: Patient presents for follow-up of lumbar spine pain left gluteal pain.  Left gluteal pain is doing quite well after physical therapy and his back pain is doing better with therapy as well.  Having improved range of motion of the back improved activity tolerance but driving has become an issue at times trying to adjust his seat.  He has pain across the right iliac crest and from the PSIS nothing down the leg.  Worse with driving standing still.  Better with moving laying down massage.  Pain is an 8/10 at worst but most the time is 0-1/10.  Doing his home exercises regularly and keeping a log of when he is able to do the exercises working with Mandie.      Imaging: MRI lumbar spine from November 2024 reviewed again today.  This reveals broad-based disc bulges throughout the lumbar spine resulting in mild central stenosis L4-5 moderate central stenosis L3-4.  L5-S1 there is moderate right and mild left foraminal stenosis postsurgical changes with mild central stenosis.  Most significant stenosis on my review is L3-4 and L2-3 with right lateral recess stenosis at L3-4 most severe with significant right facet arthropathy.There is facet arthropathy at L3-4 L4-5 and more significant  L5-S1.    I discussed the CT abdomen and pelvis as well from 2024 showing scoliosis.  Severe right facet arthropathy L5-S1 L4-5 with approximation of the right L5 transverse process with the sacrum and degenerative changes and spurring.  Severe right foraminal stenosis L5-S1.    Review of Systems: Pertinent positives: None.  Pertinent negatives: No numbness, tingling or weakness.  No bowel or bladder incontinence.  No urinary retention.  No fevers, unintentional weight loss, balance changes, headaches, frequent falling, difficulty swallowing, or coordination difficulties.  All others reviewed are negative.    Current Outpatient Medications   Medication Sig Dispense Refill     aspirin 81 MG EC tablet Take 1 tablet (81 mg) by mouth daily       atorvastatin (LIPITOR) 80 MG tablet Take 1 tablet (80 mg) by mouth daily 90 tablet 3     gabapentin (NEURONTIN) 100 MG capsule Take 1-3 capsules (100-300 mg) by mouth 3 times daily as needed. 60 capsule 0     ketoconazole (NIZORAL) 2 % external cream Apply topically as needed       LUTEIN EXTRACT-ZEAXANTHIN EXT ORAL [LUTEIN EXTRACT-ZEAXANTHIN EXT ORAL] Take 1 tablet by mouth daily.       metoprolol tartrate (LOPRESSOR) 25 MG tablet Take 0.5 tablets (12.5 mg) by mouth 2 times daily 90 tablet 3     nitroGLYcerin (NITROSTAT) 0.4 MG sublingual tablet Place 1 tablet (0.4 mg) under the tongue every 5 minutes as needed for chest pain For chest pain place 1 tablet under the tongue every 5 minutes for 3 doses. If symptoms persist 5 minutes after 1st dose call 911. 25 tablet 0     No current facility-administered medications for this visit.       Past Medical History:   Diagnosis Date     Cardiac abnormality      Low back pain      Prostate cancer (H) 2007     Superficial thrombosis of right lower extremity 05/17/2023       The following portions of the patient's history were reviewed and updated as appropriate: allergies, current medications, past family history, past medical history,  past social history, past surgical history and problem list.           Objective:   Physical Exam:    /59   Pulse 75   There is no height or weight on file to calculate BMI.      General: Alert and oriented with normal affect. Attention, knowledge, memory, and language are intact. No acute distress.   Eyes: Sclerae are clear.  Respirations: Unlabored. CV: No lower extremity edema.     Gait:  Nonantalgic  Tenderness over the right PSIS.  Tightness with right facet loading  Sensation is intact to light touch throughout the unit(s)  lower extremities.  Reflexes are  2+ patellar and Achilles with downgoing toes.    Manual muscle testing reveals:  Right /Left out of 5     5/5 hip flexors  5/5 knee flexors  5/5 knee extensors  5/5 ankle plantar flexors  5/5 ankle dorsiflexors  5/5  ankle evertors      Again, thank you for allowing me to participate in the care of your patient.        Sincerely,        Quincy Greenberg DO    Electronically signed

## 2025-03-12 ASSESSMENT — ACTIVITIES OF DAILY LIVING (ADL)
GIVING WAY, BUCKLING OR SHIFTING OF KNEE: I HAVE THE SYMPTOM BUT IT DOES NOT AFFECT MY ACTIVITY
PAIN: I DO NOT HAVE THE SYMPTOM
STAND: ACTIVITY IS NOT DIFFICULT
WEAKNESS: I HAVE THE SYMPTOM BUT IT DOES NOT AFFECT MY ACTIVITY
IN_THE_LAST_2_WEEKS_I_HAVE_DRESSED_MORE_SLOWLY_THAN_USUAL_BECAUSE_OF_BACK_PAIN: DISAGREE
I_HAVE_ONLY_WALKED_SHORT_DISTANCES_BECAUSE_OF_MY_BACK_PAIN: DISAGREE
IN_GENERAL_I_HAVE_NOT_ENJOYED_ALL_THE_THINGS_I_USED_TO_ENJOY: DISAGREE
SIT WITH YOUR KNEE BENT: ACTIVITY IS NOT DIFFICULT
SQUAT: ACTIVITY IS SOMEWHAT DIFFICULT
PAIN: I DO NOT HAVE THE SYMPTOM
GO DOWN STAIRS: ACTIVITY IS MINIMALLY DIFFICULT
GO UP STAIRS: ACTIVITY IS MINIMALLY DIFFICULT
PLEASE_INDICATE_YOR_PRIMARY_REASON_FOR_REFERRAL_TO_THERAPY:: KNEE
PLEASE_INDICATE_YOR_PRIMARY_REASON_FOR_REFERRAL_TO_THERAPY:: LOWER BACK, MID BACK, AND/OR SACRUM
WORRYING_THOUGHTS_HAVE_BEEN_GOING_THROUGH_MY_MIND_A_LOT_OF_THE_TIME: DISAGREE
GIVING WAY, BUCKLING OR SHIFTING OF KNEE: I HAVE THE SYMPTOM BUT IT DOES NOT AFFECT MY ACTIVITY
RISE FROM A CHAIR: ACTIVITY IS NOT DIFFICULT
LIMPING: I DO NOT HAVE THE SYMPTOM
STAND: ACTIVITY IS NOT DIFFICULT
KNEE_ACTIVITY_OF_DAILY_LIVING_SCORE: 88.57
WALK: ACTIVITY IS NOT DIFFICULT
WALK: ACTIVITY IS NOT DIFFICULT
HOW_WOULD_YOU_RATE_THE_CURRENT_FUNCTION_OF_YOUR_KNEE_DURING_YOUR_USUAL_DAILY_ACTIVITIES_ON_A_SCALE_FROM_0_TO_100_WITH_100_BEING_YOUR_LEVEL_OF_KNEE_FUNCTION_PRIOR_TO_YOUR_INJURY_AND_0_BEING_THE_INABILITY_TO_PERFORM_ANY_OF_YOUR_USUAL_DAILY_ACTIVITIES?: 80
SWELLING: I DO NOT HAVE THE SYMPTOM
KNEEL ON THE FRONT OF YOUR KNEE: ACTIVITY IS SOMEWHAT DIFFICULT
GO UP STAIRS: ACTIVITY IS MINIMALLY DIFFICULT
I_HAVE_HAD_PAIN_IN_THE_SHOULDER_OR_NECK_AT_SOME_TIME_IN_THE_LAST_2_WEEKS: DISAGREE
HOW_BOTHERSOME_HAS_YOUR_BACK_PAIN_BEEN_IN_THE_LAST_2_WEEKS: SLIGHTLY
AS_A_RESULT_OF_YOUR_KNEE_INJURY,_HOW_WOULD_YOU_RATE_YOUR_CURRENT_LEVEL_OF_DAILY_ACTIVITY?: NEARLY NORMAL
KNEE_ACTIVITY_OF_DAILY_LIVING_SUM: 62
RAW_SCORE: 62
STIFFNESS: I DO NOT HAVE THE SYMPTOM
LIMPING: I DO NOT HAVE THE SYMPTOM
KEELE ASSESSMENT OF PARTICIPATION_SUB_SCORE_(Q5-9): 0
RISE FROM A CHAIR: ACTIVITY IS NOT DIFFICULT
SWELLING: I DO NOT HAVE THE SYMPTOM
STIFFNESS: I DO NOT HAVE THE SYMPTOM
KEELE_TOTAL_SCORE: 0
SQUAT: ACTIVITY IS SOMEWHAT DIFFICULT
GO DOWN STAIRS: ACTIVITY IS MINIMALLY DIFFICULT
AS_A_RESULT_OF_YOUR_KNEE_INJURY,_HOW_WOULD_YOU_RATE_YOUR_CURRENT_LEVEL_OF_DAILY_ACTIVITY?: NEARLY NORMAL
HOW_WOULD_YOU_RATE_THE_CURRENT_FUNCTION_OF_YOUR_KNEE_DURING_YOUR_USUAL_DAILY_ACTIVITIES_ON_A_SCALE_FROM_0_TO_100_WITH_100_BEING_YOUR_LEVEL_OF_KNEE_FUNCTION_PRIOR_TO_YOUR_INJURY_AND_0_BEING_THE_INABILITY_TO_PERFORM_ANY_OF_YOUR_USUAL_DAILY_ACTIVITIES?: 80
SIT WITH YOUR KNEE BENT: ACTIVITY IS NOT DIFFICULT
KNEEL ON THE FRONT OF YOUR KNEE: ACTIVITY IS SOMEWHAT DIFFICULT
WEAKNESS: I HAVE THE SYMPTOM BUT IT DOES NOT AFFECT MY ACTIVITY
MY_BACK_PAIN_HAS_SPREAD_DOWN_MY_LEG(S)_AT_SOME_TIME_IN_THE_LAST_2_WEEKS: DISAGREE

## 2025-03-13 ENCOUNTER — THERAPY VISIT (OUTPATIENT)
Dept: PHYSICAL THERAPY | Facility: REHABILITATION | Age: 80
End: 2025-03-13
Payer: COMMERCIAL

## 2025-03-13 DIAGNOSIS — M54.50 LUMBAR SPINE PAIN: Primary | ICD-10-CM

## 2025-03-13 DIAGNOSIS — M53.3 SACROILIAC JOINT PAIN: ICD-10-CM

## 2025-03-13 DIAGNOSIS — M41.9 SCOLIOSIS OF LUMBAR SPINE, UNSPECIFIED SCOLIOSIS TYPE: ICD-10-CM

## 2025-03-13 DIAGNOSIS — M47.816 LUMBAR FACET ARTHROPATHY: ICD-10-CM

## 2025-03-26 ASSESSMENT — ACTIVITIES OF DAILY LIVING (ADL)
PAIN: I HAVE THE SYMPTOM BUT IT DOES NOT AFFECT MY ACTIVITY
KNEEL ON THE FRONT OF YOUR KNEE: ACTIVITY IS SOMEWHAT DIFFICULT
SWELLING: I DO NOT HAVE THE SYMPTOM
I_HAVE_HAD_PAIN_IN_THE_SHOULDER_OR_NECK_AT_SOME_TIME_IN_THE_LAST_2_WEEKS: DISAGREE
WALK: ACTIVITY IS NOT DIFFICULT
SEX_LIFE_(IF_APPLICABLE): MY SEX LIFE IS NORMAL AND CAUSES NO ADDITIONAL PAIN.
OSWESTRY DISABILITY INDEX_TOTAL_SCORE: 12
STAND: ACTIVITY IS NOT DIFFICULT
STIFFNESS: I DO NOT HAVE THE SYMPTOM
GIVING WAY, BUCKLING OR SHIFTING OF KNEE: THE SYMPTOM AFFECTS MY ACTIVITY SLIGHTLY
SECTION_4-WALKING: PAIN DOES NOT PREVENT ME FROM WALKING ANY DISTANCE.
HOW_WOULD_YOU_RATE_THE_CURRENT_FUNCTION_OF_YOUR_KNEE_DURING_YOUR_USUAL_DAILY_ACTIVITIES_ON_A_SCALE_FROM_0_TO_100_WITH_100_BEING_YOUR_LEVEL_OF_KNEE_FUNCTION_PRIOR_TO_YOUR_INJURY_AND_0_BEING_THE_INABILITY_TO_PERFORM_ANY_OF_YOUR_USUAL_DAILY_ACTIVITIES?: 75
SITTING: PAIN PREVENTS ME FROM SITTING FOR MORE THAN 1 HOUR.
HOW_WOULD_YOU_RATE_THE_CURRENT_FUNCTION_OF_YOUR_KNEE_DURING_YOUR_USUAL_DAILY_ACTIVITIES_ON_A_SCALE_FROM_0_TO_100_WITH_100_BEING_YOUR_LEVEL_OF_KNEE_FUNCTION_PRIOR_TO_YOUR_INJURY_AND_0_BEING_THE_INABILITY_TO_PERFORM_ANY_OF_YOUR_USUAL_DAILY_ACTIVITIES?: 75
WORRYING_THOUGHTS_HAVE_BEEN_GOING_THROUGH_MY_MIND_A_LOT_OF_THE_TIME: DISAGREE
SQUAT: ACTIVITY IS SOMEWHAT DIFFICULT
TRAVELING: PAIN IS BAD BUT I AM ABLE TO MANAGE TRIPS OVER TWO HOURS.
I_HAVE_ONLY_WALKED_SHORT_DISTANCES_BECAUSE_OF_MY_BACK_PAIN: DISAGREE
AS_A_RESULT_OF_YOUR_KNEE_INJURY,_HOW_WOULD_YOU_RATE_YOUR_CURRENT_LEVEL_OF_DAILY_ACTIVITY?: NEARLY NORMAL
STIFFNESS: I DO NOT HAVE THE SYMPTOM
SOCIAL_LIFE: PAIN HAS NO SIGNIFICANT EFFECT ON MY SOCIAL LIFE APART FROM LIMITING MY MORE ENERGETIC INTERESTS, E.G. SPORT, ETC.
PERSONAL_CARE: I CAN LOOK AFTER MYSELF NORMALLY WITHOUT CAUSING ADDITIONAL PAIN.
GO DOWN STAIRS: ACTIVITY IS MINIMALLY DIFFICULT
GO DOWN STAIRS: ACTIVITY IS MINIMALLY DIFFICULT
SQUAT: ACTIVITY IS SOMEWHAT DIFFICULT
RISE FROM A CHAIR: ACTIVITY IS NOT DIFFICULT
LIMPING: I HAVE THE SYMPTOM BUT IT DOES NOT AFFECT MY ACTIVITY
PAIN: I HAVE THE SYMPTOM BUT IT DOES NOT AFFECT MY ACTIVITY
PAIN_INTENSITY: THE PAIN IS VERY MILD AT THE MOMENT.
SLEEPING: MY SLEEP IS NEVER INTERRUPTED BY PAIN.
LIFTING: PAIN PREVENTS ME FROM LIFTING HEAVY WEIGHTS OFF THE FLOOR BUT I CAN MANAGE IF THEY ARE CONVENIENTLY POSITIONED, E.G. ON A TABLE.
KEELE_TOTAL_SCORE: 0
COMPUTED_OSWESTRY_SCORE: 24
GIVING WAY, BUCKLING OR SHIFTING OF KNEE: THE SYMPTOM AFFECTS MY ACTIVITY SLIGHTLY
STANDING: PAIN PREVENTS ME FROM STANDING FOR MORE THAN HALF AN HOUR.
KNEEL ON THE FRONT OF YOUR KNEE: ACTIVITY IS SOMEWHAT DIFFICULT
STAND: ACTIVITY IS NOT DIFFICULT
SWELLING: I DO NOT HAVE THE SYMPTOM
SIT WITH YOUR KNEE BENT: ACTIVITY IS NOT DIFFICULT
WEAKNESS: I HAVE THE SYMPTOM BUT IT DOES NOT AFFECT MY ACTIVITY
LIMPING: I HAVE THE SYMPTOM BUT IT DOES NOT AFFECT MY ACTIVITY
IN_GENERAL_I_HAVE_NOT_ENJOYED_ALL_THE_THINGS_I_USED_TO_ENJOY: DISAGREE
PLEASE_INDICATE_YOR_PRIMARY_REASON_FOR_REFERRAL_TO_THERAPY:: KNEE
IN_THE_LAST_2_WEEKS_I_HAVE_DRESSED_MORE_SLOWLY_THAN_USUAL_BECAUSE_OF_BACK_PAIN: DISAGREE
HOW_WOULD_YOU_RATE_THE_OVERALL_FUNCTION_OF_YOUR_KNEE_DURING_YOUR_USUAL_DAILY_ACTIVITIES?: NEARLY NORMAL
RAW_SCORE: 59
GO UP STAIRS: ACTIVITY IS MINIMALLY DIFFICULT
KNEE_ACTIVITY_OF_DAILY_LIVING_SCORE: 84.29
SIT WITH YOUR KNEE BENT: ACTIVITY IS NOT DIFFICULT
RISE FROM A CHAIR: ACTIVITY IS NOT DIFFICULT
KNEE_ACTIVITY_OF_DAILY_LIVING_SUM: 59
PLEASE_INDICATE_YOR_PRIMARY_REASON_FOR_REFERRAL_TO_THERAPY:: LOWER BACK, MID BACK, AND/OR SACRUM
KEELE ASSESSMENT OF PARTICIPATION_SUB_SCORE_(Q5-9): 0
WALK: ACTIVITY IS NOT DIFFICULT
OSWESTRY_DISABILITY_INDEX:_COUNT: 10
AS_A_RESULT_OF_YOUR_KNEE_INJURY,_HOW_WOULD_YOU_RATE_YOUR_CURRENT_LEVEL_OF_DAILY_ACTIVITY?: NEARLY NORMAL
HOW_WOULD_YOU_RATE_THE_OVERALL_FUNCTION_OF_YOUR_KNEE_DURING_YOUR_USUAL_DAILY_ACTIVITIES?: NEARLY NORMAL
GO UP STAIRS: ACTIVITY IS MINIMALLY DIFFICULT
MY_BACK_PAIN_HAS_SPREAD_DOWN_MY_LEG(S)_AT_SOME_TIME_IN_THE_LAST_2_WEEKS: DISAGREE
HOW_BOTHERSOME_HAS_YOUR_BACK_PAIN_BEEN_IN_THE_LAST_2_WEEKS: SLIGHTLY
WEAKNESS: I HAVE THE SYMPTOM BUT IT DOES NOT AFFECT MY ACTIVITY

## 2025-03-27 ENCOUNTER — THERAPY VISIT (OUTPATIENT)
Dept: PHYSICAL THERAPY | Facility: REHABILITATION | Age: 80
End: 2025-03-27
Payer: COMMERCIAL

## 2025-03-27 DIAGNOSIS — M41.9 SCOLIOSIS OF LUMBAR SPINE, UNSPECIFIED SCOLIOSIS TYPE: ICD-10-CM

## 2025-03-27 DIAGNOSIS — M47.816 LUMBAR FACET ARTHROPATHY: ICD-10-CM

## 2025-03-27 DIAGNOSIS — M54.50 LUMBAR SPINE PAIN: Primary | ICD-10-CM

## 2025-03-27 DIAGNOSIS — M53.3 SACROILIAC JOINT PAIN: ICD-10-CM

## 2025-04-01 ENCOUNTER — TRANSFERRED RECORDS (OUTPATIENT)
Dept: HEALTH INFORMATION MANAGEMENT | Facility: CLINIC | Age: 80
End: 2025-04-01
Payer: COMMERCIAL

## 2025-04-09 ASSESSMENT — ACTIVITIES OF DAILY LIVING (ADL)
SIT WITH YOUR KNEE BENT: ACTIVITY IS NOT DIFFICULT
RAW_SCORE: 64
RISE FROM A CHAIR: ACTIVITY IS NOT DIFFICULT
SQUAT: ACTIVITY IS MINIMALLY DIFFICULT
WALK: ACTIVITY IS NOT DIFFICULT
OSWESTRY DISABILITY INDEX_TOTAL_SCORE: 12
GIVING WAY, BUCKLING OR SHIFTING OF KNEE: I HAVE THE SYMPTOM BUT IT DOES NOT AFFECT MY ACTIVITY
SWELLING: I DO NOT HAVE THE SYMPTOM
HOW_WOULD_YOU_RATE_THE_CURRENT_FUNCTION_OF_YOUR_KNEE_DURING_YOUR_USUAL_DAILY_ACTIVITIES_ON_A_SCALE_FROM_0_TO_100_WITH_100_BEING_YOUR_LEVEL_OF_KNEE_FUNCTION_PRIOR_TO_YOUR_INJURY_AND_0_BEING_THE_INABILITY_TO_PERFORM_ANY_OF_YOUR_USUAL_DAILY_ACTIVITIES?: 80
SOCIAL_LIFE: PAIN HAS NO SIGNIFICANT EFFECT ON MY SOCIAL LIFE APART FROM LIMITING MY MORE ENERGETIC INTERESTS, E.G. SPORT, ETC.
STAND: ACTIVITY IS NOT DIFFICULT
PLEASE_INDICATE_YOR_PRIMARY_REASON_FOR_REFERRAL_TO_THERAPY:: LOWER BACK, MID BACK, AND/OR SACRUM
WALK: ACTIVITY IS NOT DIFFICULT
KNEEL ON THE FRONT OF YOUR KNEE: ACTIVITY IS MINIMALLY DIFFICULT
I_HAVE_HAD_PAIN_IN_THE_SHOULDER_OR_NECK_AT_SOME_TIME_IN_THE_LAST_2_WEEKS: DISAGREE
GO DOWN STAIRS: ACTIVITY IS MINIMALLY DIFFICULT
SEX_LIFE_(IF_APPLICABLE): MY SEX LIFE IS NORMAL AND CAUSES NO ADDITIONAL PAIN.
HOW_WOULD_YOU_RATE_THE_CURRENT_FUNCTION_OF_YOUR_KNEE_DURING_YOUR_USUAL_DAILY_ACTIVITIES_ON_A_SCALE_FROM_0_TO_100_WITH_100_BEING_YOUR_LEVEL_OF_KNEE_FUNCTION_PRIOR_TO_YOUR_INJURY_AND_0_BEING_THE_INABILITY_TO_PERFORM_ANY_OF_YOUR_USUAL_DAILY_ACTIVITIES?: 80
KNEEL ON THE FRONT OF YOUR KNEE: ACTIVITY IS MINIMALLY DIFFICULT
IN_THE_LAST_2_WEEKS_I_HAVE_DRESSED_MORE_SLOWLY_THAN_USUAL_BECAUSE_OF_BACK_PAIN: DISAGREE
KNEE_ACTIVITY_OF_DAILY_LIVING_SCORE: 91.43
KEELE_TOTAL_SCORE: 0
KEELE ASSESSMENT OF PARTICIPATION_SUB_SCORE_(Q5-9): 0
LIMPING: I DO NOT HAVE THE SYMPTOM
LIFTING: PAIN PREVENTS ME FROM LIFTING HEAVY WEIGHTS OFF THE FLOOR BUT I CAN MANAGE IF THEY ARE CONVENIENTLY POSITIONED, E.G. ON A TABLE.
PAIN: I DO NOT HAVE THE SYMPTOM
STIFFNESS: I DO NOT HAVE THE SYMPTOM
SIT WITH YOUR KNEE BENT: ACTIVITY IS NOT DIFFICULT
RISE FROM A CHAIR: ACTIVITY IS NOT DIFFICULT
SLEEPING: MY SLEEP IS NEVER INTERRUPTED BY PAIN.
IN_GENERAL_I_HAVE_NOT_ENJOYED_ALL_THE_THINGS_I_USED_TO_ENJOY: DISAGREE
STAND: ACTIVITY IS NOT DIFFICULT
TRAVELING: PAIN IS BAD BUT I AM ABLE TO MANAGE TRIPS OVER TWO HOURS.
MY_BACK_PAIN_HAS_SPREAD_DOWN_MY_LEG(S)_AT_SOME_TIME_IN_THE_LAST_2_WEEKS: DISAGREE
OSWESTRY_DISABILITY_INDEX:_COUNT: 10
HOW_WOULD_YOU_RATE_THE_OVERALL_FUNCTION_OF_YOUR_KNEE_DURING_YOUR_USUAL_DAILY_ACTIVITIES?: NEARLY NORMAL
PERSONAL_CARE: I CAN LOOK AFTER MYSELF NORMALLY WITHOUT CAUSING ADDITIONAL PAIN.
PAIN_INTENSITY: THE PAIN IS VERY MILD AT THE MOMENT.
GO UP STAIRS: ACTIVITY IS MINIMALLY DIFFICULT
PLEASE_INDICATE_YOR_PRIMARY_REASON_FOR_REFERRAL_TO_THERAPY:: KNEE
SWELLING: I DO NOT HAVE THE SYMPTOM
PAIN: I DO NOT HAVE THE SYMPTOM
STIFFNESS: I DO NOT HAVE THE SYMPTOM
AS_A_RESULT_OF_YOUR_KNEE_INJURY,_HOW_WOULD_YOU_RATE_YOUR_CURRENT_LEVEL_OF_DAILY_ACTIVITY?: NEARLY NORMAL
GIVING WAY, BUCKLING OR SHIFTING OF KNEE: I HAVE THE SYMPTOM BUT IT DOES NOT AFFECT MY ACTIVITY
GO UP STAIRS: ACTIVITY IS MINIMALLY DIFFICULT
GO DOWN STAIRS: ACTIVITY IS MINIMALLY DIFFICULT
SQUAT: ACTIVITY IS MINIMALLY DIFFICULT
SITTING: I CAN SIT IN MY FAVORITE CHAIR AS LONG AS I LIKE.
WORRYING_THOUGHTS_HAVE_BEEN_GOING_THROUGH_MY_MIND_A_LOT_OF_THE_TIME: DISAGREE
COMPUTED_OSWESTRY_SCORE: 24
I_HAVE_ONLY_WALKED_SHORT_DISTANCES_BECAUSE_OF_MY_BACK_PAIN: DISAGREE
WEAKNESS: I HAVE THE SYMPTOM BUT IT DOES NOT AFFECT MY ACTIVITY
SECTION_4-WALKING: PAIN PREVENTS ME FROM WALKING MORE THAN ONE MILE.
HOW_WOULD_YOU_RATE_THE_OVERALL_FUNCTION_OF_YOUR_KNEE_DURING_YOUR_USUAL_DAILY_ACTIVITIES?: NEARLY NORMAL
LIMPING: I DO NOT HAVE THE SYMPTOM
WEAKNESS: I HAVE THE SYMPTOM BUT IT DOES NOT AFFECT MY ACTIVITY
AS_A_RESULT_OF_YOUR_KNEE_INJURY,_HOW_WOULD_YOU_RATE_YOUR_CURRENT_LEVEL_OF_DAILY_ACTIVITY?: NEARLY NORMAL
KNEE_ACTIVITY_OF_DAILY_LIVING_SUM: 64
HOW_BOTHERSOME_HAS_YOUR_BACK_PAIN_BEEN_IN_THE_LAST_2_WEEKS: SLIGHTLY
STANDING: PAIN PREVENTS ME FROM STANDING FOR MORE THAN HALF AN HOUR.

## 2025-04-10 ENCOUNTER — THERAPY VISIT (OUTPATIENT)
Dept: PHYSICAL THERAPY | Facility: REHABILITATION | Age: 80
End: 2025-04-10
Payer: COMMERCIAL

## 2025-04-10 DIAGNOSIS — M47.816 LUMBAR FACET ARTHROPATHY: ICD-10-CM

## 2025-04-10 DIAGNOSIS — M53.3 SACROILIAC JOINT PAIN: ICD-10-CM

## 2025-04-10 DIAGNOSIS — M54.50 LUMBAR SPINE PAIN: Primary | ICD-10-CM

## 2025-04-17 ENCOUNTER — TRANSFERRED RECORDS (OUTPATIENT)
Dept: HEALTH INFORMATION MANAGEMENT | Facility: CLINIC | Age: 80
End: 2025-04-17
Payer: COMMERCIAL

## 2025-04-21 ENCOUNTER — PATIENT OUTREACH (OUTPATIENT)
Dept: CARE COORDINATION | Facility: CLINIC | Age: 80
End: 2025-04-21
Payer: COMMERCIAL

## 2025-04-23 ASSESSMENT — ACTIVITIES OF DAILY LIVING (ADL)
PAIN_INTENSITY: THE PAIN IS VERY MILD AT THE MOMENT.
GIVING WAY, BUCKLING OR SHIFTING OF KNEE: THE SYMPTOM AFFECTS MY ACTIVITY SLIGHTLY
HOW_WOULD_YOU_RATE_THE_CURRENT_FUNCTION_OF_YOUR_KNEE_DURING_YOUR_USUAL_DAILY_ACTIVITIES_ON_A_SCALE_FROM_0_TO_100_WITH_100_BEING_YOUR_LEVEL_OF_KNEE_FUNCTION_PRIOR_TO_YOUR_INJURY_AND_0_BEING_THE_INABILITY_TO_PERFORM_ANY_OF_YOUR_USUAL_DAILY_ACTIVITIES?: 75
SWELLING: I DO NOT HAVE THE SYMPTOM
STAND: ACTIVITY IS NOT DIFFICULT
I_HAVE_ONLY_WALKED_SHORT_DISTANCES_BECAUSE_OF_MY_BACK_PAIN: DISAGREE
SWELLING: I DO NOT HAVE THE SYMPTOM
HOW_WOULD_YOU_RATE_THE_CURRENT_FUNCTION_OF_YOUR_KNEE_DURING_YOUR_USUAL_DAILY_ACTIVITIES_ON_A_SCALE_FROM_0_TO_100_WITH_100_BEING_YOUR_LEVEL_OF_KNEE_FUNCTION_PRIOR_TO_YOUR_INJURY_AND_0_BEING_THE_INABILITY_TO_PERFORM_ANY_OF_YOUR_USUAL_DAILY_ACTIVITIES?: 75
SITTING: PAIN PREVENTS ME FROM SITTING FOR MORE THAN HALF AN HOUR.
SIT WITH YOUR KNEE BENT: ACTIVITY IS NOT DIFFICULT
KEELE_TOTAL_SCORE: 0
MY_BACK_PAIN_HAS_SPREAD_DOWN_MY_LEG(S)_AT_SOME_TIME_IN_THE_LAST_2_WEEKS: DISAGREE
WEAKNESS: I HAVE THE SYMPTOM BUT IT DOES NOT AFFECT MY ACTIVITY
COMPUTED_OSWESTRY_SCORE: 24
SLEEPING: MY SLEEP IS NEVER INTERRUPTED BY PAIN.
OSWESTRY DISABILITY INDEX_TOTAL_SCORE: 12
SIT WITH YOUR KNEE BENT: ACTIVITY IS NOT DIFFICULT
WORRYING_THOUGHTS_HAVE_BEEN_GOING_THROUGH_MY_MIND_A_LOT_OF_THE_TIME: DISAGREE
SOCIAL_LIFE: PAIN HAS NO SIGNIFICANT EFFECT ON MY SOCIAL LIFE APART FROM LIMITING MY MORE ENERGETIC INTERESTS, E.G. SPORT, ETC.
STIFFNESS: I DO NOT HAVE THE SYMPTOM
WEAKNESS: I HAVE THE SYMPTOM BUT IT DOES NOT AFFECT MY ACTIVITY
KNEEL ON THE FRONT OF YOUR KNEE: ACTIVITY IS SOMEWHAT DIFFICULT
HOW_WOULD_YOU_RATE_THE_OVERALL_FUNCTION_OF_YOUR_KNEE_DURING_YOUR_USUAL_DAILY_ACTIVITIES?: NEARLY NORMAL
KEELE ASSESSMENT OF PARTICIPATION_SUB_SCORE_(Q5-9): 0
LIFTING: I CAN LIFT HEAVY WEIGHTS BUT IT GIVES ME ADDITIONAL PAIN.
IN_GENERAL_I_HAVE_NOT_ENJOYED_ALL_THE_THINGS_I_USED_TO_ENJOY: DISAGREE
TRAVELING: PAIN RESTRICTS ME TO TRIPS OF LESS THAN ONE HOUR.
RAW_SCORE: 58
STANDING: I CAN STAND AS LONG AS I WANT BUT IT GIVES ME ADDITIONAL PAIN.
WALK: ACTIVITY IS NOT DIFFICULT
STAND: ACTIVITY IS NOT DIFFICULT
RISE FROM A CHAIR: ACTIVITY IS MINIMALLY DIFFICULT
AS_A_RESULT_OF_YOUR_KNEE_INJURY,_HOW_WOULD_YOU_RATE_YOUR_CURRENT_LEVEL_OF_DAILY_ACTIVITY?: NEARLY NORMAL
LIMPING: I HAVE THE SYMPTOM BUT IT DOES NOT AFFECT MY ACTIVITY
GIVING WAY, BUCKLING OR SHIFTING OF KNEE: THE SYMPTOM AFFECTS MY ACTIVITY SLIGHTLY
RISE FROM A CHAIR: ACTIVITY IS MINIMALLY DIFFICULT
HOW_WOULD_YOU_RATE_THE_OVERALL_FUNCTION_OF_YOUR_KNEE_DURING_YOUR_USUAL_DAILY_ACTIVITIES?: NEARLY NORMAL
AS_A_RESULT_OF_YOUR_KNEE_INJURY,_HOW_WOULD_YOU_RATE_YOUR_CURRENT_LEVEL_OF_DAILY_ACTIVITY?: NEARLY NORMAL
LIMPING: I HAVE THE SYMPTOM BUT IT DOES NOT AFFECT MY ACTIVITY
PAIN: I HAVE THE SYMPTOM BUT IT DOES NOT AFFECT MY ACTIVITY
GO UP STAIRS: ACTIVITY IS SOMEWHAT DIFFICULT
HOW_BOTHERSOME_HAS_YOUR_BACK_PAIN_BEEN_IN_THE_LAST_2_WEEKS: MODERATELY
SEX_LIFE_(IF_APPLICABLE): MY SEX LIFE IS NORMAL AND CAUSES NO ADDITIONAL PAIN.
OSWESTRY_DISABILITY_INDEX:_COUNT: 10
KNEEL ON THE FRONT OF YOUR KNEE: ACTIVITY IS SOMEWHAT DIFFICULT
KNEE_ACTIVITY_OF_DAILY_LIVING_SUM: 58
IN_THE_LAST_2_WEEKS_I_HAVE_DRESSED_MORE_SLOWLY_THAN_USUAL_BECAUSE_OF_BACK_PAIN: DISAGREE
PAIN: I HAVE THE SYMPTOM BUT IT DOES NOT AFFECT MY ACTIVITY
SECTION_4-WALKING: PAIN PREVENTS ME FROM WALKING MORE THAN ONE MILE.
STIFFNESS: I DO NOT HAVE THE SYMPTOM
PERSONAL_CARE: I CAN LOOK AFTER MYSELF NORMALLY WITHOUT CAUSING ADDITIONAL PAIN.
PLEASE_INDICATE_YOR_PRIMARY_REASON_FOR_REFERRAL_TO_THERAPY:: KNEE
SQUAT: ACTIVITY IS MINIMALLY DIFFICULT
PLEASE_INDICATE_YOR_PRIMARY_REASON_FOR_REFERRAL_TO_THERAPY:: LOWER BACK, MID BACK, AND/OR SACRUM
I_HAVE_HAD_PAIN_IN_THE_SHOULDER_OR_NECK_AT_SOME_TIME_IN_THE_LAST_2_WEEKS: DISAGREE
GO UP STAIRS: ACTIVITY IS SOMEWHAT DIFFICULT
KNEE_ACTIVITY_OF_DAILY_LIVING_SCORE: 82.86
GO DOWN STAIRS: ACTIVITY IS MINIMALLY DIFFICULT
WALK: ACTIVITY IS NOT DIFFICULT
SQUAT: ACTIVITY IS MINIMALLY DIFFICULT
GO DOWN STAIRS: ACTIVITY IS MINIMALLY DIFFICULT

## 2025-04-24 ENCOUNTER — THERAPY VISIT (OUTPATIENT)
Dept: PHYSICAL THERAPY | Facility: REHABILITATION | Age: 80
End: 2025-04-24
Payer: COMMERCIAL

## 2025-04-24 DIAGNOSIS — M47.816 LUMBAR FACET ARTHROPATHY: ICD-10-CM

## 2025-04-24 DIAGNOSIS — M53.3 SACROILIAC JOINT PAIN: ICD-10-CM

## 2025-04-24 DIAGNOSIS — M54.50 LUMBAR SPINE PAIN: Primary | ICD-10-CM

## 2025-04-24 NOTE — PROGRESS NOTES
[facility]                                                                                   OUTPATIENT PHYSICAL THERAPY    PLAN OF TREATMENT FOR OUTPATIENT REHABILITATION   Patient's Last Name, First Name, Daniel Eldridge YOB: 1945   Provider's Name   [facility]   Medical Record No.  [id number]     Onset Date: 01/01/25  Start of Care Date: 01/30/25     Medical Diagnosis:  Lumbar spine pain and SI pain      PT Treatment Diagnosis:  R LBP, L hip tightness Plan of Treatment  Frequency/Duration: 1x/week/ 60 days    Certification date from 04/24/25 to 06/22/25         See note for plan of treatment details and functional goals     Mandie Mancilla, PT                         I CERTIFY THE NEED FOR THESE SERVICES FURNISHED UNDER        THIS PLAN OF TREATMENT AND WHILE UNDER MY CARE     (Physician attestation of this document indicates review and certification of the therapy plan).              Referring Provider:  Quincy Greenberg    Initial Assessment  See Epic Evaluation- Start of Care Date: 01/30/25            PLAN  Continue therapy per current plan of care.    Beginning/End Dates of Progress Note Reporting Period:  01/30/25 to 04/24/2025    Referring Provider:  Quincy Greenberg    04/24/25 0500   Appointment Info   Signing clinician's name / credentials Mandie Mancilla, PT   Total/Authorized Visits 12   Visits Used 8   Medical Diagnosis Lumbar spine pain and SI pain   PT Tx Diagnosis R LBP, L hip tightness   Progress Note/Certification   Start of Care Date 01/30/25   Onset of illness/injury or Date of Surgery 01/01/25   Therapy Frequency 1x/week   Predicted Duration 60 days   Certification date from 04/24/25   Certification date to 06/22/25   Progress Note Completed Date 01/30/25   PT Goal 1   Goal Identifier 1   Goal Description Patient will be independent with home exercise program and self-care   Target Date 04/30/25   PT Goal 2   Goal  Identifier 2   Goal Description Patient will be able to walk 20 minutes for exercise, 4x/week for improvement in health and well-being   Goal Progress He has done everyday but saturday   Target Date 04/30/25   Date Met 02/19/25   PT Goal 3   Goal Identifier 3   Goal Description Patient will be able to do wood working with pain <6/10 for improvement in QOL   Goal Progress Better- low pain of about a 2   Target Date 04/29/25   Date Met 04/24/25   Subjective Report   Subjective Report He is noting to have gone golfing on Tuesday and then followed it up with yardwork. Felt pretty good when he was golfing. A little tight towards the end.   Objective Measure 1   Objective Measure CARMINE 24% on IE   Details 24% on 4/24/25   Objective Measure 2   Objective Measure pain level   Details 0   Objective Measure 3   Objective Measure AROM lumbar flexion 16 cm finger tip to floor on 4/24/25   Details was 18 cm on IE   Therapeutic Procedure/Exercise   Therapeutic Procedures: strength, endurance, ROM, flexibility minutes (49787) 10   Ther Proc 1 supine hip ADD and ABD isometrics xHEP   Ther Proc 1 - Details supine trunk rotation with pectoral stretch x HEP   Ther Proc 2 Seated hamstring stretch x HEP   Ther Proc 2 - Details supine hip hike on R -HEP   Ther Proc 3 treadmil x 6 min at 2.3 MPH with subjective assessment   Ther Proc 3 - Details supine hip flexor stretch x HEP   Ther Proc 4 Guteal myofascial- full arc, top ar and lower arc x HEP   Ther Proc 4 - Details bridge with toes lifted x patient demo'd and then educated on inc hold time when he reaches 30   Ther Proc 5 Driving range- not yet   Ther Proc 5 - Details all 4's hip extension x 5/leg   Skilled Intervention AROM of the lumbar spine measured   Manual Therapy   Manual Therapy: Mobilization, MFR, MLD, friction massage minutes (16863) 30   Manual Therapy 1 Patinet positioned in prone- MFR layer III to B QL, glut med and passive quad stretch. Sacral unlaoding. Focused a little  more on T-L junction paraspsinals   Skilled Intervention to improve flexibility and reduce tension   Patient Response/Progress Noted more restriction today   Education   Learner/Method Patient   Plan   Home program PTRX   Plan for next session finalize HEP   Comments   Comments Daniel is presenting for his 8th follow-up of lumbar spine pain. He is noting great reduction in his pain while wood working and has just started a return to gold. We are close to finalizing his HEP. We will decrease treatment frequency and ease into independent home program. He is doing very well and continues to benefit from skilled therapy services.   Total Session Time   Timed Code Treatment Minutes 40   Total Treatment Time (sum of timed and untimed services) 40

## 2025-05-05 ENCOUNTER — TRANSFERRED RECORDS (OUTPATIENT)
Dept: HEALTH INFORMATION MANAGEMENT | Facility: CLINIC | Age: 80
End: 2025-05-05
Payer: COMMERCIAL

## 2025-05-05 ENCOUNTER — PATIENT OUTREACH (OUTPATIENT)
Dept: CARE COORDINATION | Facility: CLINIC | Age: 80
End: 2025-05-05
Payer: COMMERCIAL

## 2025-05-07 ASSESSMENT — ACTIVITIES OF DAILY LIVING (ADL)
HOW_WOULD_YOU_RATE_THE_OVERALL_FUNCTION_OF_YOUR_KNEE_DURING_YOUR_USUAL_DAILY_ACTIVITIES?: NEARLY NORMAL
HOW_WOULD_YOU_RATE_THE_OVERALL_FUNCTION_OF_YOUR_KNEE_DURING_YOUR_USUAL_DAILY_ACTIVITIES?: NEARLY NORMAL
RISE FROM A CHAIR: ACTIVITY IS NOT DIFFICULT
PAIN_INTENSITY: THE PAIN IS MODERATE AT THE MOMENT.
LIMPING: I DO NOT HAVE THE SYMPTOM
PLEASE_INDICATE_YOR_PRIMARY_REASON_FOR_REFERRAL_TO_THERAPY:: KNEE
SWELLING: I DO NOT HAVE THE SYMPTOM
GO UP STAIRS: ACTIVITY IS MINIMALLY DIFFICULT
SOCIAL_LIFE: PAIN HAS NO SIGNIFICANT EFFECT ON MY SOCIAL LIFE APART FROM LIMITING MY MORE ENERGETIC INTERESTS, E.G. SPORT, ETC.
PAIN: I HAVE THE SYMPTOM BUT IT DOES NOT AFFECT MY ACTIVITY
COMPUTED_OSWESTRY_SCORE: 22
GO DOWN STAIRS: ACTIVITY IS MINIMALLY DIFFICULT
SQUAT: ACTIVITY IS MINIMALLY DIFFICULT
SITTING: I CAN SIT IN MY FAVORITE CHAIR AS LONG AS I LIKE.
GO DOWN STAIRS: ACTIVITY IS MINIMALLY DIFFICULT
PERSONAL_CARE: I CAN LOOK AFTER MYSELF NORMALLY WITHOUT CAUSING ADDITIONAL PAIN.
OSWESTRY DISABILITY INDEX_TOTAL_SCORE: 11
KNEE_ACTIVITY_OF_DAILY_LIVING_SUM: 63
SIT WITH YOUR KNEE BENT: ACTIVITY IS NOT DIFFICULT
KEELE ASSESSMENT OF PARTICIPATION_SUB_SCORE_(Q5-9): 1
GIVING WAY, BUCKLING OR SHIFTING OF KNEE: I HAVE THE SYMPTOM BUT IT DOES NOT AFFECT MY ACTIVITY
STAND: ACTIVITY IS NOT DIFFICULT
AS_A_RESULT_OF_YOUR_KNEE_INJURY,_HOW_WOULD_YOU_RATE_YOUR_CURRENT_LEVEL_OF_DAILY_ACTIVITY?: NEARLY NORMAL
WALK: ACTIVITY IS NOT DIFFICULT
HOW_BOTHERSOME_HAS_YOUR_BACK_PAIN_BEEN_IN_THE_LAST_2_WEEKS: MODERATELY
KNEE_ACTIVITY_OF_DAILY_LIVING_SCORE: 90
HOW_WOULD_YOU_RATE_THE_CURRENT_FUNCTION_OF_YOUR_KNEE_DURING_YOUR_USUAL_DAILY_ACTIVITIES_ON_A_SCALE_FROM_0_TO_100_WITH_100_BEING_YOUR_LEVEL_OF_KNEE_FUNCTION_PRIOR_TO_YOUR_INJURY_AND_0_BEING_THE_INABILITY_TO_PERFORM_ANY_OF_YOUR_USUAL_DAILY_ACTIVITIES?: 75
KNEEL ON THE FRONT OF YOUR KNEE: ACTIVITY IS MINIMALLY DIFFICULT
PAIN: I HAVE THE SYMPTOM BUT IT DOES NOT AFFECT MY ACTIVITY
SECTION_4-WALKING: PAIN PREVENTS ME FROM WALKING MORE THAN ONE MILE.
LIMPING: I DO NOT HAVE THE SYMPTOM
LIFTING: I CAN LIFT HEAVY WEIGHTS BUT IT GIVES ME ADDITIONAL PAIN.
SQUAT: ACTIVITY IS MINIMALLY DIFFICULT
TRAVELING: PAIN IS BAD BUT I AM ABLE TO MANAGE TRIPS OVER TWO HOURS.
SIT WITH YOUR KNEE BENT: ACTIVITY IS NOT DIFFICULT
OSWESTRY_DISABILITY_INDEX:_COUNT: 10
SEX_LIFE_(IF_APPLICABLE): MY SEX LIFE IS NORMAL AND CAUSES NO ADDITIONAL PAIN.
HOW_WOULD_YOU_RATE_THE_CURRENT_FUNCTION_OF_YOUR_KNEE_DURING_YOUR_USUAL_DAILY_ACTIVITIES_ON_A_SCALE_FROM_0_TO_100_WITH_100_BEING_YOUR_LEVEL_OF_KNEE_FUNCTION_PRIOR_TO_YOUR_INJURY_AND_0_BEING_THE_INABILITY_TO_PERFORM_ANY_OF_YOUR_USUAL_DAILY_ACTIVITIES?: 75
IN_GENERAL_I_HAVE_NOT_ENJOYED_ALL_THE_THINGS_I_USED_TO_ENJOY: AGREE
RISE FROM A CHAIR: ACTIVITY IS NOT DIFFICULT
RAW_SCORE: 63
SLEEPING: MY SLEEP IS NEVER INTERRUPTED BY PAIN.
GO UP STAIRS: ACTIVITY IS MINIMALLY DIFFICULT
PLEASE_INDICATE_YOR_PRIMARY_REASON_FOR_REFERRAL_TO_THERAPY:: LOWER BACK, MID BACK, AND/OR SACRUM
WALK: ACTIVITY IS NOT DIFFICULT
MY_BACK_PAIN_HAS_SPREAD_DOWN_MY_LEG(S)_AT_SOME_TIME_IN_THE_LAST_2_WEEKS: DISAGREE
KEELE_TOTAL_SCORE: 1
AS_A_RESULT_OF_YOUR_KNEE_INJURY,_HOW_WOULD_YOU_RATE_YOUR_CURRENT_LEVEL_OF_DAILY_ACTIVITY?: NEARLY NORMAL
WEAKNESS: I HAVE THE SYMPTOM BUT IT DOES NOT AFFECT MY ACTIVITY
STIFFNESS: I DO NOT HAVE THE SYMPTOM
STANDING: PAIN PREVENTS ME FROM STANDING FOR MORE THAN 1 HOUR.
WORRYING_THOUGHTS_HAVE_BEEN_GOING_THROUGH_MY_MIND_A_LOT_OF_THE_TIME: DISAGREE
KNEEL ON THE FRONT OF YOUR KNEE: ACTIVITY IS MINIMALLY DIFFICULT
I_HAVE_HAD_PAIN_IN_THE_SHOULDER_OR_NECK_AT_SOME_TIME_IN_THE_LAST_2_WEEKS: DISAGREE
IN_THE_LAST_2_WEEKS_I_HAVE_DRESSED_MORE_SLOWLY_THAN_USUAL_BECAUSE_OF_BACK_PAIN: DISAGREE
WEAKNESS: I HAVE THE SYMPTOM BUT IT DOES NOT AFFECT MY ACTIVITY
STIFFNESS: I DO NOT HAVE THE SYMPTOM
I_HAVE_ONLY_WALKED_SHORT_DISTANCES_BECAUSE_OF_MY_BACK_PAIN: DISAGREE
SWELLING: I DO NOT HAVE THE SYMPTOM
STAND: ACTIVITY IS NOT DIFFICULT
GIVING WAY, BUCKLING OR SHIFTING OF KNEE: I HAVE THE SYMPTOM BUT IT DOES NOT AFFECT MY ACTIVITY

## 2025-05-08 ENCOUNTER — THERAPY VISIT (OUTPATIENT)
Dept: PHYSICAL THERAPY | Facility: REHABILITATION | Age: 80
End: 2025-05-08
Payer: COMMERCIAL

## 2025-05-08 DIAGNOSIS — M53.3 SACROILIAC JOINT PAIN: ICD-10-CM

## 2025-05-08 DIAGNOSIS — M54.50 LUMBAR SPINE PAIN: Primary | ICD-10-CM

## 2025-05-08 DIAGNOSIS — M47.816 LUMBAR FACET ARTHROPATHY: ICD-10-CM

## 2025-05-21 ENCOUNTER — TRANSFERRED RECORDS (OUTPATIENT)
Dept: HEALTH INFORMATION MANAGEMENT | Facility: CLINIC | Age: 80
End: 2025-05-21
Payer: COMMERCIAL

## 2025-05-21 ASSESSMENT — ACTIVITIES OF DAILY LIVING (ADL)
SECTION_4-WALKING: PAIN DOES NOT PREVENT ME FROM WALKING ANY DISTANCE.
WALK: ACTIVITY IS NOT DIFFICULT
RAW_SCORE: 62
SOCIAL_LIFE: PAIN HAS NO SIGNIFICANT EFFECT ON MY SOCIAL LIFE APART FROM LIMITING MY MORE ENERGETIC INTERESTS, E.G. SPORT, ETC.
LIMPING: I DO NOT HAVE THE SYMPTOM
SIT WITH YOUR KNEE BENT: ACTIVITY IS NOT DIFFICULT
MY_BACK_PAIN_HAS_SPREAD_DOWN_MY_LEG(S)_AT_SOME_TIME_IN_THE_LAST_2_WEEKS: DISAGREE
WALK: ACTIVITY IS NOT DIFFICULT
AS_A_RESULT_OF_YOUR_KNEE_INJURY,_HOW_WOULD_YOU_RATE_YOUR_CURRENT_LEVEL_OF_DAILY_ACTIVITY?: NEARLY NORMAL
GO UP STAIRS: ACTIVITY IS MINIMALLY DIFFICULT
IN_GENERAL_I_HAVE_NOT_ENJOYED_ALL_THE_THINGS_I_USED_TO_ENJOY: DISAGREE
WEAKNESS: I HAVE THE SYMPTOM BUT IT DOES NOT AFFECT MY ACTIVITY
HOW_WOULD_YOU_RATE_THE_OVERALL_FUNCTION_OF_YOUR_KNEE_DURING_YOUR_USUAL_DAILY_ACTIVITIES?: NEARLY NORMAL
GO DOWN STAIRS: ACTIVITY IS MINIMALLY DIFFICULT
COMPUTED_OSWESTRY_SCORE: 16
STANDING: I CAN STAND AS LONG AS I WANT BUT IT GIVES ME ADDITIONAL PAIN.
WEAKNESS: I HAVE THE SYMPTOM BUT IT DOES NOT AFFECT MY ACTIVITY
RISE FROM A CHAIR: ACTIVITY IS MINIMALLY DIFFICULT
HOW_WOULD_YOU_RATE_THE_CURRENT_FUNCTION_OF_YOUR_KNEE_DURING_YOUR_USUAL_DAILY_ACTIVITIES_ON_A_SCALE_FROM_0_TO_100_WITH_100_BEING_YOUR_LEVEL_OF_KNEE_FUNCTION_PRIOR_TO_YOUR_INJURY_AND_0_BEING_THE_INABILITY_TO_PERFORM_ANY_OF_YOUR_USUAL_DAILY_ACTIVITIES?: 75
RISE FROM A CHAIR: ACTIVITY IS MINIMALLY DIFFICULT
PAIN_INTENSITY: THE PAIN IS VERY MILD AT THE MOMENT.
KEELE ASSESSMENT OF PARTICIPATION_SUB_SCORE_(Q5-9): 0
SLEEPING: MY SLEEP IS NEVER INTERRUPTED BY PAIN.
LIFTING: I CAN LIFT HEAVY WEIGHTS BUT IT GIVES ME ADDITIONAL PAIN.
PLEASE_INDICATE_YOR_PRIMARY_REASON_FOR_REFERRAL_TO_THERAPY:: LOWER BACK, MID BACK, AND/OR SACRUM
STIFFNESS: I DO NOT HAVE THE SYMPTOM
STAND: ACTIVITY IS NOT DIFFICULT
PERSONAL_CARE: I CAN LOOK AFTER MYSELF NORMALLY WITHOUT CAUSING ADDITIONAL PAIN.
KEELE_TOTAL_SCORE: 0
PAIN: I HAVE THE SYMPTOM BUT IT DOES NOT AFFECT MY ACTIVITY
IN_THE_LAST_2_WEEKS_I_HAVE_DRESSED_MORE_SLOWLY_THAN_USUAL_BECAUSE_OF_BACK_PAIN: DISAGREE
GIVING WAY, BUCKLING OR SHIFTING OF KNEE: I HAVE THE SYMPTOM BUT IT DOES NOT AFFECT MY ACTIVITY
SIT WITH YOUR KNEE BENT: ACTIVITY IS NOT DIFFICULT
SWELLING: I DO NOT HAVE THE SYMPTOM
STIFFNESS: I DO NOT HAVE THE SYMPTOM
SQUAT: ACTIVITY IS MINIMALLY DIFFICULT
KNEEL ON THE FRONT OF YOUR KNEE: ACTIVITY IS MINIMALLY DIFFICULT
SWELLING: I DO NOT HAVE THE SYMPTOM
PAIN: I HAVE THE SYMPTOM BUT IT DOES NOT AFFECT MY ACTIVITY
LIMPING: I DO NOT HAVE THE SYMPTOM
KNEEL ON THE FRONT OF YOUR KNEE: ACTIVITY IS MINIMALLY DIFFICULT
KNEE_ACTIVITY_OF_DAILY_LIVING_SCORE: 88.57
STAND: ACTIVITY IS NOT DIFFICULT
GO DOWN STAIRS: ACTIVITY IS MINIMALLY DIFFICULT
KNEE_ACTIVITY_OF_DAILY_LIVING_SUM: 62
OSWESTRY DISABILITY INDEX_TOTAL_SCORE: 8
SEX_LIFE_(IF_APPLICABLE): MY SEX LIFE IS NORMAL AND CAUSES NO ADDITIONAL PAIN.
WORRYING_THOUGHTS_HAVE_BEEN_GOING_THROUGH_MY_MIND_A_LOT_OF_THE_TIME: DISAGREE
PLEASE_INDICATE_YOR_PRIMARY_REASON_FOR_REFERRAL_TO_THERAPY:: KNEE
HOW_BOTHERSOME_HAS_YOUR_BACK_PAIN_BEEN_IN_THE_LAST_2_WEEKS: SLIGHTLY
AS_A_RESULT_OF_YOUR_KNEE_INJURY,_HOW_WOULD_YOU_RATE_YOUR_CURRENT_LEVEL_OF_DAILY_ACTIVITY?: NEARLY NORMAL
HOW_WOULD_YOU_RATE_THE_CURRENT_FUNCTION_OF_YOUR_KNEE_DURING_YOUR_USUAL_DAILY_ACTIVITIES_ON_A_SCALE_FROM_0_TO_100_WITH_100_BEING_YOUR_LEVEL_OF_KNEE_FUNCTION_PRIOR_TO_YOUR_INJURY_AND_0_BEING_THE_INABILITY_TO_PERFORM_ANY_OF_YOUR_USUAL_DAILY_ACTIVITIES?: 75
I_HAVE_ONLY_WALKED_SHORT_DISTANCES_BECAUSE_OF_MY_BACK_PAIN: DISAGREE
TRAVELING: PAIN IS BAD BUT I AM ABLE TO MANAGE TRIPS OVER TWO HOURS.
SITTING: I CAN SIT IN MY FAVORITE CHAIR AS LONG AS I LIKE.
I_HAVE_HAD_PAIN_IN_THE_SHOULDER_OR_NECK_AT_SOME_TIME_IN_THE_LAST_2_WEEKS: DISAGREE
HOW_WOULD_YOU_RATE_THE_OVERALL_FUNCTION_OF_YOUR_KNEE_DURING_YOUR_USUAL_DAILY_ACTIVITIES?: NEARLY NORMAL
OSWESTRY_DISABILITY_INDEX:_COUNT: 10
GO UP STAIRS: ACTIVITY IS MINIMALLY DIFFICULT
GIVING WAY, BUCKLING OR SHIFTING OF KNEE: I HAVE THE SYMPTOM BUT IT DOES NOT AFFECT MY ACTIVITY
SQUAT: ACTIVITY IS MINIMALLY DIFFICULT

## 2025-05-22 ENCOUNTER — THERAPY VISIT (OUTPATIENT)
Dept: PHYSICAL THERAPY | Facility: REHABILITATION | Age: 80
End: 2025-05-22
Payer: COMMERCIAL

## 2025-05-22 DIAGNOSIS — M54.50 LUMBAR SPINE PAIN: Primary | ICD-10-CM

## 2025-05-22 DIAGNOSIS — M47.816 LUMBAR FACET ARTHROPATHY: ICD-10-CM

## 2025-05-22 DIAGNOSIS — M53.3 SACROILIAC JOINT PAIN: ICD-10-CM

## 2025-05-28 ENCOUNTER — PATIENT OUTREACH (OUTPATIENT)
Dept: FAMILY MEDICINE | Facility: CLINIC | Age: 80
End: 2025-05-28
Payer: COMMERCIAL

## 2025-05-28 SDOH — HEALTH STABILITY: PHYSICAL HEALTH: ON AVERAGE, HOW MANY DAYS PER WEEK DO YOU ENGAGE IN MODERATE TO STRENUOUS EXERCISE (LIKE A BRISK WALK)?: 4 DAYS

## 2025-05-28 SDOH — HEALTH STABILITY: PHYSICAL HEALTH: ON AVERAGE, HOW MANY MINUTES DO YOU ENGAGE IN EXERCISE AT THIS LEVEL?: 40 MIN

## 2025-05-28 ASSESSMENT — SOCIAL DETERMINANTS OF HEALTH (SDOH): HOW OFTEN DO YOU GET TOGETHER WITH FRIENDS OR RELATIVES?: THREE TIMES A WEEK

## 2025-05-28 NOTE — TELEPHONE ENCOUNTER
Patient Quality Outreach    Patient is due for the following:   Physical Annual Wellness Visit    Action(s) Taken:   Schedule a Annual Wellness Visit    Type of outreach:    Chart review performed, no outreach needed.    Questions for provider review:    None         Catherine Corona MA  Chart routed to None.

## 2025-05-29 ENCOUNTER — OFFICE VISIT (OUTPATIENT)
Dept: FAMILY MEDICINE | Facility: CLINIC | Age: 80
End: 2025-05-29
Payer: COMMERCIAL

## 2025-05-29 VITALS
SYSTOLIC BLOOD PRESSURE: 100 MMHG | HEART RATE: 67 BPM | RESPIRATION RATE: 18 BRPM | TEMPERATURE: 98.2 F | HEIGHT: 72 IN | DIASTOLIC BLOOD PRESSURE: 60 MMHG | WEIGHT: 213 LBS | OXYGEN SATURATION: 96 % | BODY MASS INDEX: 28.85 KG/M2

## 2025-05-29 DIAGNOSIS — Z95.5 S/P CORONARY ARTERY STENT PLACEMENT: ICD-10-CM

## 2025-05-29 DIAGNOSIS — I25.10 CORONARY ARTERY DISEASE INVOLVING NATIVE CORONARY ARTERY OF NATIVE HEART WITHOUT ANGINA PECTORIS: ICD-10-CM

## 2025-05-29 DIAGNOSIS — R73.03 PREDIABETES: ICD-10-CM

## 2025-05-29 DIAGNOSIS — I42.9 CARDIOMYOPATHY, UNSPECIFIED TYPE (H): ICD-10-CM

## 2025-05-29 DIAGNOSIS — I25.10 CORONARY ARTERY DISEASE DUE TO LIPID RICH PLAQUE: ICD-10-CM

## 2025-05-29 DIAGNOSIS — I25.83 CORONARY ARTERY DISEASE DUE TO LIPID RICH PLAQUE: ICD-10-CM

## 2025-05-29 DIAGNOSIS — Z00.00 ENCOUNTER FOR MEDICARE ANNUAL WELLNESS EXAM: Primary | ICD-10-CM

## 2025-05-29 LAB
ERYTHROCYTE [DISTWIDTH] IN BLOOD BY AUTOMATED COUNT: 12.9 % (ref 10–15)
EST. AVERAGE GLUCOSE BLD GHB EST-MCNC: 128 MG/DL
HBA1C MFR BLD: 6.1 % (ref 0–5.6)
HCT VFR BLD AUTO: 43.2 % (ref 40–53)
HGB BLD-MCNC: 14.1 G/DL (ref 13.3–17.7)
MCH RBC QN AUTO: 30.7 PG (ref 26.5–33)
MCHC RBC AUTO-ENTMCNC: 32.6 G/DL (ref 31.5–36.5)
MCV RBC AUTO: 94 FL (ref 78–100)
PLATELET # BLD AUTO: 248 10E3/UL (ref 150–450)
RBC # BLD AUTO: 4.6 10E6/UL (ref 4.4–5.9)
WBC # BLD AUTO: 4.3 10E3/UL (ref 4–11)

## 2025-05-29 RX ORDER — METOPROLOL TARTRATE 25 MG/1
12.5 TABLET, FILM COATED ORAL 2 TIMES DAILY
Qty: 90 TABLET | Refills: 3 | Status: SHIPPED | OUTPATIENT
Start: 2025-05-29

## 2025-05-29 RX ORDER — ATORVASTATIN CALCIUM 80 MG/1
80 TABLET, FILM COATED ORAL DAILY
Qty: 90 TABLET | Refills: 3 | Status: SHIPPED | OUTPATIENT
Start: 2025-05-29

## 2025-05-29 ASSESSMENT — PAIN SCALES - GENERAL: PAINLEVEL_OUTOF10: NO PAIN (0)

## 2025-05-29 NOTE — PROGRESS NOTES
"Preventive Care Visit  [facility]  Goran Mckeon NP, Family Medicine  May 29, 2025      Assessment & Plan       ICD-10-CM    1. Encounter for Medicare annual wellness exam  Z00.00       2. Coronary artery disease due to lipid rich plaque  I25.10 atorvastatin (LIPITOR) 80 MG tablet    I25.83       3. S/P coronary artery stent placement  Z95.5 metoprolol tartrate (LOPRESSOR) 25 MG tablet      4. Prediabetes  R73.03 Hemoglobin A1c     Comprehensive metabolic panel (BMP + Alb, Alk Phos, ALT, AST, Total. Bili, TP)     Hemoglobin A1c     Comprehensive metabolic panel (BMP + Alb, Alk Phos, ALT, AST, Total. Bili, TP)      5. Cardiomyopathy, unspecified type (H)  I42.9 Lipid panel reflex to direct LDL Fasting     CBC with platelets     Lipid panel reflex to direct LDL Fasting     CBC with platelets      6. Coronary artery disease involving native coronary artery of native heart without angina pectoris  I25.10         Doing well.  Refill sent to pharmacy.  Update labs in the setting of cardiomyopathy.  Follow-up with cardiology at regular scheduled intervals.  Discussed vaccine options.  Consider 1 final colonoscopy at the age of 81.  Reviewed healthy lifestyle behaviors.  If needing new referral to PT for back pain flareup in the future, let me know    Reviewed physical therapy note x 1, cardiology note x 1    The longitudinal plan of care for the diagnosis(es)/condition(s) as documented were addressed during this visit. Due to the added complexity in care, I will continue to support Daniel in the subsequent management and with ongoing continuity of care.          BMI  Estimated body mass index is 29.29 kg/m  as calculated from the following:    Height as of this encounter: 1.816 m (5' 11.5\").    Weight as of this encounter: 96.6 kg (213 lb).       Counseling  Appropriate preventive services were addressed with this patient via screening, questionnaire, or discussion as appropriate for fall " prevention, nutrition, physical activity, Tobacco-use cessation, social engagement, weight loss and cognition.  Checklist reviewing preventive services available has been given to the patient.  Reviewed patient's diet, addressing concerns and/or questions.   Information on urinary incontinence and treatment options given to patient.     Aroldo Sanchez is a 80 year old, presenting for the following:  Annual Visit (Fasting)        5/29/2025     9:05 AM   Additional Questions   Roomed by Catherine Corona CMA          HPI      Doing well.  No chest pain or palpitation issues.  No polydipsia/polyuria in the setting of prediabetes.  Trying to be more intentional about diet.  Will be watching grandchildren at 1 day of the week.  History prostate cancer status postsurgical resection.  Remained nondetectable.  Stopped doing PSA.      Advance Care Planning    Reviewed today        5/28/2025   General Health   How would you rate your overall physical health? Good   Feel stress (tense, anxious, or unable to sleep) Not at all         5/28/2025   Nutrition   Diet: Regular (no restrictions)         5/28/2025   Exercise   Days per week of moderate/strenous exercise 4 days   Average minutes spent exercising at this level 40 min         5/28/2025   Social Factors   Frequency of gathering with friends or relatives Three times a week   Worry food won't last until get money to buy more No   Food not last or not have enough money for food? No   Do you have housing? (Housing is defined as stable permanent housing and does not include staying outside in a car, in a tent, in an abandoned building, in an overnight shelter, or couch-surfing.) Yes   Are you worried about losing your housing? No   Lack of transportation? No   Unable to get utilities (heat,electricity)? No         5/28/2025   Fall Risk   Fallen 2 or more times in the past year? No   Trouble with walking or balance? No          5/28/2025   Activities of Daily Living- Home Safety    Needs help with the following daily activites None of the above   Safety concerns in the home None of the above         5/28/2025   Dental   Dentist two times every year? Yes         5/28/2025   Hearing Screening   Hearing concerns? None of the above         5/28/2025   Driving Risk Screening   Patient/family members have concerns about driving No         5/28/2025   General Alertness/Fatigue Screening   Have you been more tired than usual lately? No         5/28/2025   Urinary Incontinence Screening   Bothered by leaking urine in past 6 months Yes         Today's PHQ-2 Score:       5/28/2025     3:38 PM   PHQ-2 ( 1999 [employer])   Q1: Little interest or pleasure in doing things 0   Q2: Feeling down, depressed or hopeless 0   PHQ-2 Score 0    Q1: Little interest or pleasure in doing things Not at all   Q2: Feeling down, depressed or hopeless Not at all   PHQ-2 Score 0       Patient-reported           5/28/2025   Substance Use   Alcohol more than 3/day or more than 7/wk No   Do you have a current opioid prescription? No   How severe/bad is pain from 1 to 10? 1/10   Do you use any other substances recreationally? No     Social History     Tobacco Use    Smoking status: Never    Smokeless tobacco: Never   Vaping Use    Vaping status: Never Used   Substance Use Topics    Alcohol use: No    Drug use: No       ASCVD Risk   The 10-year ASCVD risk score (Candida WALKER, et al., 2019) is: 19.8%    Values used to calculate the score:      Age: 80 years      Sex: Male      Is Non- : No      Diabetic: No      Tobacco smoker: No      Systolic Blood Pressure: 100 mmHg      Is BP treated: No      HDL Cholesterol: 37 mg/dL      Total Cholesterol: 130 mg/dL      Reviewed and updated as needed this visit by Provider                    Past Medical History:   Diagnosis Date    Arthritis 2021    left knee & right hand    Cardiac abnormality     Low back pain     Prostate cancer (H) 2007    Superficial  thrombosis of right lower extremity 05/17/2023     Past Surgical History:   Procedure Laterality Date    ABDOMEN SURGERY  2007    CARPAL TUNNEL RELEASE RT/LT Left     COLONOSCOPY  2023    EYE SURGERY      HERNIA REPAIR      INSERT / REPLACE / REMOVE PROSTHESIS URETHRAL SPHINCTER N/A 06/23/2014    Procedure: ARTIFICIAL URINARY SPHINCTER INSERTION;  Surgeon: Torres Sanabria MD;  Location: [facility];  Service:     INSERT / REPLACE / REMOVE PROSTHESIS URETHRAL SPHINCTER N/A 02/16/2015    Procedure: REMOVAL/REPLACEMENT OF ARTIFICIAL URINARY SPHINCTER COMPONENT;  Surgeon: Torres Sanabria MD;  Location: [facility];  Service:     LUMBAR SPINE SURGERY  01/01/1981    PROSTATECTOMY       Current providers sharing in care for this patient include:  Patient Care Team:  Goran Mckeon NP as PCP - General  Goran Mckeon NP as Assigned PCP  Franck Rey MD as MD (Cardiovascular Disease)  Torrie Ness APRN CNP as Assigned Surgical Provider  Quincy Greenberg DO as Assigned Neuroscience Provider    The following health maintenance items are reviewed in Epic and correct as of today:  Health Maintenance   Topic Date Due    ANNUAL REVIEW OF HM ORDERS  Never done    ZOSTER VACCINE (1 of 2) Never done    RSV VACCINE (1 - 1-dose 75+ series) Never done    COVID-19 VACCINE (5 - 2024-25 season) 09/01/2024    LIPID  05/21/2025    MEDICARE ANNUAL WELLNESS VISIT  05/21/2025    FALL RISK ASSESSMENT  05/29/2026    COLORECTAL CANCER SCREENING  09/12/2026    DIABETES SCREENING  02/12/2028    ADVANCE CARE PLANNING  05/17/2028    DTAP/TDAP/TD VACCINE (3 - Td or Tdap) 03/25/2029    HEPATITIS C SCREENING  Completed    PHQ-2 (once per calendar year)  Completed    INFLUENZA VACCINE  Completed    PNEUMOCOCCAL VACCINE 50+ YEARS  Completed    HPV VACCINE  Aged Out    MENINGITIS VACCINE  Aged Out         Review of Systems  Constitutional, HEENT, cardiovascular, pulmonary, gi and gu systems are negative, except as otherwise  "noted.     Objective    Exam  /60 (BP Location: Left arm, Patient Position: Sitting, Cuff Size: Adult Large)   Pulse 67   Temp 98.2  F (36.8  C) (Oral)   Resp 18   Ht 1.816 m (5' 11.5\")   Wt 96.6 kg (213 lb)   SpO2 96%   BMI 29.29 kg/m     Estimated body mass index is 29.29 kg/m  as calculated from the following:    Height as of this encounter: 1.816 m (5' 11.5\").    Weight as of this encounter: 96.6 kg (213 lb).    Physical Exam  GENERAL: alert and no distress  EYES: Eyes grossly normal to inspection, PERRL and conjunctivae and sclerae normal  HENT: ear canals and TM's normal, nose and mouth without ulcers or lesions  NECK: no adenopathy, no asymmetry, masses, or scars  RESP: lungs clear to auscultation - no rales, rhonchi or wheezes  CV: regular rate and rhythm, normal S1 S2, no S3 or S4, no murmur, click or rub, no peripheral edema  ABDOMEN: soft, nontender, no hepatosplenomegaly, no masses and bowel sounds normal  MS: no gross musculoskeletal defects noted, no edema  SKIN: no suspicious lesions or rashes  NEURO: Normal strength and tone, mentation intact and speech normal  PSYCH: mentation appears normal, affect normal/bright        5/29/2025   Mini Cog   Clock Draw Score 2 Normal   3 Item Recall 3 objects recalled   Mini Cog Total Score 5       Signed Electronically by: Goran Mckeon NP    "

## 2025-05-29 NOTE — PATIENT INSTRUCTIONS
"Voltaren gel or biofreeze or CBD creams are all fine to try for the achey joints.    That shingles vaccine we talked about is called \"Shingrix\".     For patients on Medicare, this is covered at the pharmacy under Medicare part D starting in 2023.          Patient Education   Preventive Care Advice   This is general advice given by our system to help you stay healthy. However, your care team may have specific advice just for you. Please talk to your care team about your preventive care needs.  Nutrition  Eat 5 or more servings of fruits and vegetables each day.  Try wheat bread, brown rice and whole grain pasta (instead of white bread, rice, and pasta).  Get enough calcium and vitamin D. Check the label on foods and aim for 100% of the RDA (recommended daily allowance).  Lifestyle  Exercise at least 150 minutes each week  (30 minutes a day, 5 days a week).  Do muscle strengthening activities 2 days a week. These help control your weight and prevent disease.  No smoking.  Wear sunscreen to prevent skin cancer.  Have a dental exam and cleaning every 6 months.  Yearly exams  See your health care team every year to talk about:  Any changes in your health.  Any medicines your care team has prescribed.  Preventive care, family planning, and ways to prevent chronic diseases.  Shots (vaccines)   HPV shots (up to age 26), if you've never had them before.  Hepatitis B shots (up to age 59), if you've never had them before.  COVID-19 shot: Get this shot when it's due.  Flu shot: Get a flu shot every year.  Tetanus shot: Get a tetanus shot every 10 years.  Pneumococcal, hepatitis A, and RSV shots: Ask your care team if you need these based on your risk.  Shingles shot (for age 50 and up)  General health tests  Diabetes screening:  Starting at age 35, Get screened for diabetes at least every 3 years.  If you are younger than age 35, ask your care team if you should be screened for diabetes.  Cholesterol test: At age 39, start " having a cholesterol test every 5 years, or more often if advised.  Bone density scan (DEXA): At age 50, ask your care team if you should have this scan for osteoporosis (brittle bones).  Hepatitis C: Get tested at least once in your life.  STIs (sexually transmitted infections)  Before age 24: Ask your care team if you should be screened for STIs.  After age 24: Get screened for STIs if you're at risk. You are at risk for STIs (including HIV) if:  You are sexually active with more than one person.  You don't use condoms every time.  You or a partner was diagnosed with a sexually transmitted infection.  If you are at risk for HIV, ask about PrEP medicine to prevent HIV.  Get tested for HIV at least once in your life, whether you are at risk for HIV or not.  Cancer screening tests  Cervical cancer screening: If you have a cervix, begin getting regular cervical cancer screening tests starting at age 21.  Breast cancer scan (mammogram): If you've ever had breasts, begin having regular mammograms starting at age 40. This is a scan to check for breast cancer.  Colon cancer screening: It is important to start screening for colon cancer at age 45.  Have a colonoscopy test every 10 years (or more often if you're at risk) Or, ask your provider about stool tests like a FIT test every year or Cologuard test every 3 years.  To learn more about your testing options, visit:   .  For help making a decision, visit:   https://bit.ly/jn95140.  Prostate cancer screening test: If you have a prostate, ask your care team if a prostate cancer screening test (PSA) at age 55 is right for you.  Lung cancer screening: If you are a current or former smoker ages 50 to 80, ask your care team if ongoing lung cancer screenings are right for you.  For informational purposes only. Not to replace the advice of your health care provider. Copyright   2023 [employer]. All rights reserved. Clinically reviewed by the [facility]  Transitions Program. Libersy [id number] - REV 01/24.  Bladder Training: Care Instructions  Your Care Instructions     Bladder training is used to treat urge incontinence and stress incontinence. Urge incontinence means that the need to urinate comes on so fast that you can't get to a toilet in time. Stress incontinence means that you leak urine because of pressure on your bladder. For example, it may happen when you laugh, cough, or lift something heavy.  Bladder training can increase how long you can wait before you have to urinate. It can also help your bladder hold more urine. And it can give you better control over the urge to urinate.  It is important to remember that bladder training takes a few weeks to a few months to make a difference. You may not see results right away, but don't give up.  Follow-up care is a key part of your treatment and safety. Be sure to make and go to all appointments, and call your doctor if you are having problems. It's also a good idea to know your test results and keep a list of the medicines you take.  How can you care for yourself at home?  Work with your doctor to come up with a bladder training program that is right for you. You may use one or more of the following methods.  Delayed urination  In the beginning, try to keep from urinating for 5 minutes after you first feel the need to go.  While you wait, take deep, slow breaths to relax. Kegel exercises can also help you delay the need to go to the bathroom.  After some practice, when you can easily wait 5 minutes to urinate, try to wait 10 minutes before you urinate.  Slowly increase the waiting period until you are able to control when you have to urinate.  Scheduled urination  Empty your bladder when you first wake up in the morning.  Schedule times throughout the day when you will urinate.  Start by going to the bathroom every hour, even if you don't need to go.  Slowly increase the time between trips to the bathroom.  When  "you have found a schedule that works well for you, keep doing it.  If you wake up during the night and have to urinate, do it. Apply your schedule to waking hours only.  Kegel exercises  These tighten and strengthen pelvic muscles, which can help you control the flow of urine. (If doing these exercises causes pain, stop doing them and talk with your doctor.) To do Kegel exercises:  Squeeze your muscles as if you were trying not to pass gas. Or squeeze your muscles as if you were stopping the flow of urine. Your belly, legs, and buttocks shouldn't move.  Hold the squeeze for 3 seconds, then relax for 5 to 10 seconds.  Start with 3 seconds, then add 1 second each week until you are able to squeeze for 10 seconds.  Repeat the exercise 10 times a session. Do 3 to 8 sessions a day.  When should you call for help?  Watch closely for changes in your health, and be sure to contact your doctor if:    Your incontinence is getting worse.     You do not get better as expected.   Where can you learn more?  Go to https://www.[employer].net/patiented  Enter V684 in the search box to learn more about \"Bladder Training: Care Instructions.\"  Current as of: April 30, 2024  Content Version: 14.4    1814-7390 [employer].   Care instructions adapted under license by your healthcare professional. If you have questions about a medical condition or this instruction, always ask your healthcare professional. [employer] disclaims any warranty or liability for your use of this information.       "

## 2025-05-30 ENCOUNTER — TRANSFERRED RECORDS (OUTPATIENT)
Dept: HEALTH INFORMATION MANAGEMENT | Facility: CLINIC | Age: 80
End: 2025-05-30
Payer: COMMERCIAL

## 2025-05-30 ENCOUNTER — RESULTS FOLLOW-UP (OUTPATIENT)
Dept: FAMILY MEDICINE | Facility: CLINIC | Age: 80
End: 2025-05-30

## 2025-06-03 NOTE — PROGRESS NOTES
Assessment/Plan:      Daniel was seen today for back pain.    Diagnoses and all orders for this visit:    Lumbar spine pain  -     Physical Therapy  Referral; Future    Lumbar facet arthropathy  -     Physical Therapy  Referral; Future    Scoliosis of lumbar spine, unspecified scoliosis type  -     Physical Therapy  Referral; Future    Foraminal stenosis of lumbar region  -     Physical Therapy  Referral; Future    Sagittal plane imbalance  -     Physical Therapy  Referral; Future    Sacroiliac joint pain  -     Physical Therapy  Referral; Future         Assessment: Pleasant 80 year old male with a history of hypertension, Coronary artery disease prostate cancer with:     1.  Improved chronic lumbar spine pain lumbosacral junction SI region.  Most consistent with facet arthropathy and SI pain in the setting of lumbar scoliotic curve with likely sagittal plane imbalance.  History of lumbar decompression.  Multilevel lumbar degenerative disc disease with facet arthropathy on the right at L3-4, L4-5 and L5-S1  in the setting of scoliosis.  Has severe right foraminal stenosis L5-S1 and some of his right iliac crest pain could be proximal radicular more likely facet mediated.  Moderate stenosis L3-4 centrally.  Also has approximation of the transverse process of L5 with the sacrum on the right as well.  Has had mild positive thigh thrust Lobito's bilaterally for SI pain and Gaenslen's on the left.  Continues up to 40% improvement with physical therapy doing home exercises regularly.  Pleased with his progress thus far.    2.  Myofascial pain lumbar spine and gluteal region.     3.  Decreased range of motion of the hips.  Likely capsular tightness and mild degenerative changes.         Discussion:    1.  We discussed his progress in physical therapy.  Has been able to continue to improve his symptoms overall up to 40% improved still has some significant issues with  bending such as gardening but overall is pleased with his trajectory.  We discussed options of injections and medications but not interested in those at this time.  We discussed further physical therapy as well.    2.  Recommend continue therapy 1 time a month for the next 4 months to see if he can continue on this path of improvement.  New prescription is provided we will see if insurance would approve this.    3.  Can consider medial branch blocks on the right at L4, 5 versus transforaminal epidural steroid injection right L5-S1 as he may have some proximal radicular pain but currently is not wanting any injection.    4.  Follow-up with me in 3 months for ongoing management of his complex spine case.       It was our pleasure caring for your patient today, if there any questions or concerns please do not hesitate to contact us.    The longitudinal plan of care for the diagnosis(es)/condition(s) as documented were addressed during this visit. Due to the added complexity in care, I will continue to support Daniel in the subsequent management and with ongoing continuity of care.      Subjective:   Patient ID: Daniel Jacobs is a 80 year old male.    History of Present Illness: Patient presents for follow-up of lumbar spine pain right lumbosacral junction into the right iliac crest at times no pain down the leg paresthesias or weakness.  Pain is a 6/10 at worst 0/10 today.  Worse with prolonged activities such as sitting in the car or bending and more significantly bending working in the garden.  Standing at the workbench used to be an issue but is tolerating longer periods of that with physical therapy.  Working with Mandie in PT and doing home exercises regularly and having slow steady improvement over time up to 40% improved per his report.  Laying flat does help along with laying on his stomach and doing prone extension which helps his back when his pain becomes more significant.      Imaging: MRI lumbar spine from  "November reviewed broad-based disc bulge at multiple levels with moderate stenosis at L3-4 and severe bilateral foraminal stenosis.  Facet arthropathy L4-5 as well.  Postsurgical changes L5-S1.    Review of Systems: Denies paresthesias or weakness in the legs bowel or bladder incontinence headaches falls swelling issues fevers unintentional weight loss           Current Outpatient Medications   Medication Sig Dispense Refill    aspirin 81 MG EC tablet Take 1 tablet (81 mg) by mouth daily      atorvastatin (LIPITOR) 80 MG tablet Take 1 tablet (80 mg) by mouth daily. 90 tablet 3    ketoconazole (NIZORAL) 2 % external cream Apply topically as needed      LUTEIN EXTRACT-ZEAXANTHIN EXT ORAL [LUTEIN EXTRACT-ZEAXANTHIN EXT ORAL] Take 1 tablet by mouth daily.      metoprolol tartrate (LOPRESSOR) 25 MG tablet Take 0.5 tablets (12.5 mg) by mouth 2 times daily. 90 tablet 3    nitroGLYcerin (NITROSTAT) 0.4 MG sublingual tablet Place 1 tablet (0.4 mg) under the tongue every 5 minutes as needed for chest pain For chest pain place 1 tablet under the tongue every 5 minutes for 3 doses. If symptoms persist 5 minutes after 1st dose call 911. 25 tablet 0     No current facility-administered medications for this visit.       Past Medical History:   Diagnosis Date    Arthritis 2021    left knee & right hand    Cardiac abnormality     Low back pain     Prostate cancer (H) 2007    Superficial thrombosis of right lower extremity 05/17/2023       The following portions of the patient's history were reviewed and updated as appropriate: allergies, current medications, past family history, past medical history, past social history, past surgical history and problem list.           Objective:   Physical Exam:    /59   Pulse 62   Ht 5' 11.5\" (1.816 m)   Wt 219 lb (99.3 kg)   BMI 30.12 kg/m    There is no height or weight on file to calculate BMI.      General: Alert and oriented with normal affect. Attention, knowledge, memory, and " language are intact. No acute distress.   Eyes: Sclerae are clear.  Respirations: Unlabored. CV: No lower extremity edema.     Gait:  Nonantalgic, mild sagittal plane imbalance  Decreased range of motion right greater than left hip internal rotation from seated.  Tenderness right lumbosacral junction.  Sensation is intact to light touch throughout the   lower extremities.  Reflexes are  2+ patellar and 1+ Achilles      Manual muscle testing reveals:  Right /Left out of 5     5/5 hip flexors  5/5 knee flexors  5/5 knee extensors  5/5 ankle plantar flexors  5/5 ankle dorsiflexors  5/5  EHL and ankle evertors

## 2025-06-04 ENCOUNTER — OFFICE VISIT (OUTPATIENT)
Dept: PHYSICAL MEDICINE AND REHAB | Facility: CLINIC | Age: 80
End: 2025-06-04
Payer: COMMERCIAL

## 2025-06-04 VITALS
WEIGHT: 219 LBS | DIASTOLIC BLOOD PRESSURE: 59 MMHG | HEIGHT: 72 IN | BODY MASS INDEX: 29.66 KG/M2 | HEART RATE: 62 BPM | SYSTOLIC BLOOD PRESSURE: 110 MMHG

## 2025-06-04 DIAGNOSIS — M43.8X9 SAGITTAL PLANE IMBALANCE: ICD-10-CM

## 2025-06-04 DIAGNOSIS — M41.9 SCOLIOSIS OF LUMBAR SPINE, UNSPECIFIED SCOLIOSIS TYPE: ICD-10-CM

## 2025-06-04 DIAGNOSIS — M48.061 FORAMINAL STENOSIS OF LUMBAR REGION: ICD-10-CM

## 2025-06-04 DIAGNOSIS — M47.816 LUMBAR FACET ARTHROPATHY: ICD-10-CM

## 2025-06-04 DIAGNOSIS — M53.3 SACROILIAC JOINT PAIN: ICD-10-CM

## 2025-06-04 DIAGNOSIS — M54.50 LUMBAR SPINE PAIN: Primary | ICD-10-CM

## 2025-06-04 PROCEDURE — G2211 COMPLEX E/M VISIT ADD ON: HCPCS | Performed by: PHYSICAL MEDICINE & REHABILITATION

## 2025-06-04 PROCEDURE — 99213 OFFICE O/P EST LOW 20 MIN: CPT | Performed by: PHYSICAL MEDICINE & REHABILITATION

## 2025-06-04 PROCEDURE — 3078F DIAST BP <80 MM HG: CPT | Performed by: PHYSICAL MEDICINE & REHABILITATION

## 2025-06-04 PROCEDURE — 3074F SYST BP LT 130 MM HG: CPT | Performed by: PHYSICAL MEDICINE & REHABILITATION

## 2025-06-04 PROCEDURE — 1126F AMNT PAIN NOTED NONE PRSNT: CPT | Performed by: PHYSICAL MEDICINE & REHABILITATION

## 2025-06-04 ASSESSMENT — PAIN SCALES - GENERAL: PAINLEVEL_OUTOF10: NO PAIN (0)

## 2025-06-04 NOTE — LETTER
6/4/2025      Daniel Jacobs  [address] [place] [id number]      Dear Colleague,    Thank you for referring your patient, Daniel Jacobs, to the [facility]. Please see a copy of my visit note below.    Assessment/Plan:      Daniel was seen today for back pain.    Diagnoses and all orders for this visit:    Lumbar spine pain  -     Physical Therapy  Referral; Future    Lumbar facet arthropathy  -     Physical Therapy  Referral; Future    Scoliosis of lumbar spine, unspecified scoliosis type  -     Physical Therapy  Referral; Future    Foraminal stenosis of lumbar region  -     Physical Therapy  Referral; Future    Sagittal plane imbalance  -     Physical Therapy  Referral; Future    Sacroiliac joint pain  -     Physical Therapy  Referral; Future         Assessment: Pleasant 80 year old male with a history of hypertension, Coronary artery disease prostate cancer with:     1.  Improved chronic lumbar spine pain lumbosacral junction SI region.  Most consistent with facet arthropathy and SI pain in the setting of lumbar scoliotic curve with likely sagittal plane imbalance.  History of lumbar decompression.  Multilevel lumbar degenerative disc disease with facet arthropathy on the right at L3-4, L4-5 and L5-S1  in the setting of scoliosis.  Has severe right foraminal stenosis L5-S1 and some of his right iliac crest pain could be proximal radicular more likely facet mediated.  Moderate stenosis L3-4 centrally.  Also has approximation of the transverse process of L5 with the sacrum on the right as well.  Has had mild positive thigh thrust Lobito's bilaterally for SI pain and Gaenslen's on the left.  Continues up to 40% improvement with physical therapy doing home exercises regularly.  Pleased with his progress thus far.    2.  Myofascial pain lumbar spine and gluteal region.     3.  Decreased range of motion of the hips.  Likely  capsular tightness and mild degenerative changes.         Discussion:    1.  We discussed his progress in physical therapy.  Has been able to continue to improve his symptoms overall up to 40% improved still has some significant issues with bending such as gardening but overall is pleased with his trajectory.  We discussed options of injections and medications but not interested in those at this time.  We discussed further physical therapy as well.    2.  Recommend continue therapy 1 time a month for the next 4 months to see if he can continue on this path of improvement.  New prescription is provided we will see if insurance would approve this.    3.  Can consider medial branch blocks on the right at L4, 5 versus transforaminal epidural steroid injection right L5-S1 as he may have some proximal radicular pain but currently is not wanting any injection.    4.  Follow-up with me in 3 months for ongoing management of his complex spine case.       It was our pleasure caring for your patient today, if there any questions or concerns please do not hesitate to contact us.    The longitudinal plan of care for the diagnosis(es)/condition(s) as documented were addressed during this visit. Due to the added complexity in care, I will continue to support Daniel in the subsequent management and with ongoing continuity of care.      Subjective:   Patient ID: Daniel Jacobs is a 80 year old male.    History of Present Illness: Patient presents for follow-up of lumbar spine pain right lumbosacral junction into the right iliac crest at times no pain down the leg paresthesias or weakness.  Pain is a 6/10 at worst 0/10 today.  Worse with prolonged activities such as sitting in the car or bending and more significantly bending working in the garden.  Standing at the workbench used to be an issue but is tolerating longer periods of that with physical therapy.  Working with Mandie in PT and doing home exercises regularly and having slow  steady improvement over time up to 40% improved per his report.  Laying flat does help along with laying on his stomach and doing prone extension which helps his back when his pain becomes more significant.      Imaging: MRI lumbar spine from November reviewed broad-based disc bulge at multiple levels with moderate stenosis at L3-4 and severe bilateral foraminal stenosis.  Facet arthropathy L4-5 as well.  Postsurgical changes L5-S1.    Review of Systems: Denies paresthesias or weakness in the legs bowel or bladder incontinence headaches falls swelling issues fevers unintentional weight loss           Current Outpatient Medications   Medication Sig Dispense Refill     aspirin 81 MG EC tablet Take 1 tablet (81 mg) by mouth daily       atorvastatin (LIPITOR) 80 MG tablet Take 1 tablet (80 mg) by mouth daily. 90 tablet 3     ketoconazole (NIZORAL) 2 % external cream Apply topically as needed       LUTEIN EXTRACT-ZEAXANTHIN EXT ORAL [LUTEIN EXTRACT-ZEAXANTHIN EXT ORAL] Take 1 tablet by mouth daily.       metoprolol tartrate (LOPRESSOR) 25 MG tablet Take 0.5 tablets (12.5 mg) by mouth 2 times daily. 90 tablet 3     nitroGLYcerin (NITROSTAT) 0.4 MG sublingual tablet Place 1 tablet (0.4 mg) under the tongue every 5 minutes as needed for chest pain For chest pain place 1 tablet under the tongue every 5 minutes for 3 doses. If symptoms persist 5 minutes after 1st dose call 911. 25 tablet 0     No current facility-administered medications for this visit.       Past Medical History:   Diagnosis Date     Arthritis 2021    left knee & right hand     Cardiac abnormality      Low back pain      Prostate cancer (H) 2007     Superficial thrombosis of right lower extremity 05/17/2023       The following portions of the patient's history were reviewed and updated as appropriate: allergies, current medications, past family history, past medical history, past social history, past surgical history and problem list.           Objective:  "  Physical Exam:    /59   Pulse 62   Ht 5' 11.5\" (1.816 m)   Wt 219 lb (99.3 kg)   BMI 30.12 kg/m    There is no height or weight on file to calculate BMI.      General: Alert and oriented with normal affect. Attention, knowledge, memory, and language are intact. No acute distress.   Eyes: Sclerae are clear.  Respirations: Unlabored. CV: No lower extremity edema.     Gait:  Nonantalgic, mild sagittal plane imbalance  Decreased range of motion right greater than left hip internal rotation from seated.  Tenderness right lumbosacral junction.  Sensation is intact to light touch throughout the   lower extremities.  Reflexes are  2+ patellar and 1+ Achilles      Manual muscle testing reveals:  Right /Left out of 5     5/5 hip flexors  5/5 knee flexors  5/5 knee extensors  5/5 ankle plantar flexors  5/5 ankle dorsiflexors  5/5  EHL and ankle evertors    Again, thank you for allowing me to participate in the care of your patient.        Sincerely,        Quincy Greenberg DO    Electronically signed"

## 2025-06-11 ASSESSMENT — ACTIVITIES OF DAILY LIVING (ADL)
GO UP STAIRS: ACTIVITY IS MINIMALLY DIFFICULT
SIT WITH YOUR KNEE BENT: ACTIVITY IS NOT DIFFICULT
WALK: ACTIVITY IS NOT DIFFICULT
HOW_WOULD_YOU_RATE_THE_OVERALL_FUNCTION_OF_YOUR_KNEE_DURING_YOUR_USUAL_DAILY_ACTIVITIES?: NEARLY NORMAL
I_HAVE_ONLY_WALKED_SHORT_DISTANCES_BECAUSE_OF_MY_BACK_PAIN: DISAGREE
PERSONAL_CARE: I CAN LOOK AFTER MYSELF NORMALLY WITHOUT CAUSING ADDITIONAL PAIN.
LIFTING: I CAN LIFT HEAVY WEIGHTS WITHOUT ADDITIONAL PAIN.
HOW_BOTHERSOME_HAS_YOUR_BACK_PAIN_BEEN_IN_THE_LAST_2_WEEKS: SLIGHTLY
GO UP STAIRS: ACTIVITY IS MINIMALLY DIFFICULT
GIVING WAY, BUCKLING OR SHIFTING OF KNEE: I HAVE THE SYMPTOM BUT IT DOES NOT AFFECT MY ACTIVITY
SWELLING: I HAVE THE SYMPTOM BUT IT DOES NOT AFFECT MY ACTIVITY
WALK: ACTIVITY IS NOT DIFFICULT
WORRYING_THOUGHTS_HAVE_BEEN_GOING_THROUGH_MY_MIND_A_LOT_OF_THE_TIME: DISAGREE
LIMPING: I HAVE THE SYMPTOM BUT IT DOES NOT AFFECT MY ACTIVITY
HOW_WOULD_YOU_RATE_THE_CURRENT_FUNCTION_OF_YOUR_KNEE_DURING_YOUR_USUAL_DAILY_ACTIVITIES_ON_A_SCALE_FROM_0_TO_100_WITH_100_BEING_YOUR_LEVEL_OF_KNEE_FUNCTION_PRIOR_TO_YOUR_INJURY_AND_0_BEING_THE_INABILITY_TO_PERFORM_ANY_OF_YOUR_USUAL_DAILY_ACTIVITIES?: 80
PAIN: I HAVE THE SYMPTOM BUT IT DOES NOT AFFECT MY ACTIVITY
PLEASE_INDICATE_YOR_PRIMARY_REASON_FOR_REFERRAL_TO_THERAPY:: KNEE
RAW_SCORE: 60
RISE FROM A CHAIR: ACTIVITY IS MINIMALLY DIFFICULT
SWELLING: I HAVE THE SYMPTOM BUT IT DOES NOT AFFECT MY ACTIVITY
OSWESTRY_DISABILITY_INDEX:_COUNT: 9
SECTION_4-WALKING: PAIN PREVENTS ME FROM WALKING MORE THAN ONE MILE.
STAND: ACTIVITY IS NOT DIFFICULT
HOW_WOULD_YOU_RATE_THE_CURRENT_FUNCTION_OF_YOUR_KNEE_DURING_YOUR_USUAL_DAILY_ACTIVITIES_ON_A_SCALE_FROM_0_TO_100_WITH_100_BEING_YOUR_LEVEL_OF_KNEE_FUNCTION_PRIOR_TO_YOUR_INJURY_AND_0_BEING_THE_INABILITY_TO_PERFORM_ANY_OF_YOUR_USUAL_DAILY_ACTIVITIES?: 80
WEAKNESS: I HAVE THE SYMPTOM BUT IT DOES NOT AFFECT MY ACTIVITY
COMPUTED_OSWESTRY_SCORE: 20
PAIN: I HAVE THE SYMPTOM BUT IT DOES NOT AFFECT MY ACTIVITY
KEELE ASSESSMENT OF PARTICIPATION_SUB_SCORE_(Q5-9): 0
WEAKNESS: I HAVE THE SYMPTOM BUT IT DOES NOT AFFECT MY ACTIVITY
I_HAVE_HAD_PAIN_IN_THE_SHOULDER_OR_NECK_AT_SOME_TIME_IN_THE_LAST_2_WEEKS: DISAGREE
LIMPING: I HAVE THE SYMPTOM BUT IT DOES NOT AFFECT MY ACTIVITY
PLEASE_INDICATE_YOR_PRIMARY_REASON_FOR_REFERRAL_TO_THERAPY:: LOWER BACK, MID BACK, AND/OR SACRUM
IN_GENERAL_I_HAVE_NOT_ENJOYED_ALL_THE_THINGS_I_USED_TO_ENJOY: DISAGREE
PAIN_INTENSITY: THE PAIN IS VERY MILD AT THE MOMENT.
KNEE_ACTIVITY_OF_DAILY_LIVING_SUM: 60
RISE FROM A CHAIR: ACTIVITY IS MINIMALLY DIFFICULT
GIVING WAY, BUCKLING OR SHIFTING OF KNEE: I HAVE THE SYMPTOM BUT IT DOES NOT AFFECT MY ACTIVITY
KNEE_ACTIVITY_OF_DAILY_LIVING_SCORE: 85.71
GO DOWN STAIRS: ACTIVITY IS MINIMALLY DIFFICULT
KNEEL ON THE FRONT OF YOUR KNEE: ACTIVITY IS MINIMALLY DIFFICULT
MY_BACK_PAIN_HAS_SPREAD_DOWN_MY_LEG(S)_AT_SOME_TIME_IN_THE_LAST_2_WEEKS: DISAGREE
SITTING: I CAN SIT IN MY FAVORITE CHAIR AS LONG AS I LIKE.
SQUAT: ACTIVITY IS MINIMALLY DIFFICULT
STAND: ACTIVITY IS NOT DIFFICULT
STIFFNESS: I DO NOT HAVE THE SYMPTOM
KEELE_TOTAL_SCORE: 0
OSWESTRY DISABILITY INDEX_TOTAL_SCORE: 9
SOCIAL_LIFE: PAIN HAS NO SIGNIFICANT EFFECT ON MY SOCIAL LIFE APART FROM LIMITING MY MORE ENERGETIC INTERESTS, E.G. SPORT, ETC.
HOW_WOULD_YOU_RATE_THE_OVERALL_FUNCTION_OF_YOUR_KNEE_DURING_YOUR_USUAL_DAILY_ACTIVITIES?: NEARLY NORMAL
TRAVELING: PAIN IS BAD BUT I AM ABLE TO MANAGE TRIPS OVER TWO HOURS.
SEX_LIFE_(IF_APPLICABLE): MY SEX LIFE IS NORMAL AND CAUSES NO ADDITIONAL PAIN.
AS_A_RESULT_OF_YOUR_KNEE_INJURY,_HOW_WOULD_YOU_RATE_YOUR_CURRENT_LEVEL_OF_DAILY_ACTIVITY?: NEARLY NORMAL
SQUAT: ACTIVITY IS MINIMALLY DIFFICULT
KNEEL ON THE FRONT OF YOUR KNEE: ACTIVITY IS MINIMALLY DIFFICULT
AS_A_RESULT_OF_YOUR_KNEE_INJURY,_HOW_WOULD_YOU_RATE_YOUR_CURRENT_LEVEL_OF_DAILY_ACTIVITY?: NEARLY NORMAL
GO DOWN STAIRS: ACTIVITY IS MINIMALLY DIFFICULT
IN_THE_LAST_2_WEEKS_I_HAVE_DRESSED_MORE_SLOWLY_THAN_USUAL_BECAUSE_OF_BACK_PAIN: DISAGREE
STIFFNESS: I DO NOT HAVE THE SYMPTOM
SIT WITH YOUR KNEE BENT: ACTIVITY IS NOT DIFFICULT
STANDING: PAIN PREVENTS ME FROM STANDING FOR MORE THAN 1 HOUR.

## 2025-06-12 ENCOUNTER — THERAPY VISIT (OUTPATIENT)
Dept: PHYSICAL THERAPY | Facility: REHABILITATION | Age: 80
End: 2025-06-12
Payer: COMMERCIAL

## 2025-06-12 DIAGNOSIS — M47.816 LUMBAR FACET ARTHROPATHY: ICD-10-CM

## 2025-06-12 DIAGNOSIS — M54.50 LUMBAR SPINE PAIN: ICD-10-CM

## 2025-06-12 DIAGNOSIS — M43.8X9 SAGITTAL PLANE IMBALANCE: ICD-10-CM

## 2025-06-12 DIAGNOSIS — M48.061 FORAMINAL STENOSIS OF LUMBAR REGION: ICD-10-CM

## 2025-06-12 DIAGNOSIS — M41.9 SCOLIOSIS OF LUMBAR SPINE, UNSPECIFIED SCOLIOSIS TYPE: ICD-10-CM

## 2025-06-12 DIAGNOSIS — M53.3 SACROILIAC JOINT PAIN: ICD-10-CM

## 2025-06-12 NOTE — PROGRESS NOTES
[facility]                                                                                   OUTPATIENT PHYSICAL THERAPY    PLAN OF TREATMENT FOR OUTPATIENT REHABILITATION   Patient's Last Name, First Name, Daniel Eldridge YOB: 1945   Provider's Name   [facility]   Medical Record No.  [id number]     Onset Date: 01/01/25  Start of Care Date: 01/30/25     Medical Diagnosis:  Lumbar spine pain and SI pain      PT Treatment Diagnosis:  R LBP, L hip tightness Plan of Treatment  Frequency/Duration: 1x/week/ 90 days    Certification date from 06/12/25 to 09/09/25         See note for plan of treatment details and functional goals     Mandie Mancilla, PT                         I CERTIFY THE NEED FOR THESE SERVICES FURNISHED UNDER        THIS PLAN OF TREATMENT AND WHILE UNDER MY CARE     (Physician attestation of this document indicates review and certification of the therapy plan).              Referring Provider:  Quincy Greenberg    Initial Assessment  See Epic Evaluation- Start of Care Date: 01/30/25            PLAN  Continue therapy per current plan of care.    Beginning/End Dates of Progress Note Reporting Period:  04/24/25 to 06/12/2025    Referring Provider:  Quincy Greenberg     06/12/25 0500   Appointment Info   Signing clinician's name / credentials Mandie Mancilla, PT   Total/Authorized Visits 12   Visits Used 11   Medical Diagnosis Lumbar spine pain and SI pain   PT Tx Diagnosis R LBP, L hip tightness   Progress Note/Certification   Start of Care Date 01/30/25   Onset of illness/injury or Date of Surgery 01/01/25   Therapy Frequency 1x/week   Predicted Duration 90 days   Certification date from 06/12/25   Certification date to 09/09/25   Progress Note Completed Date 04/24/25   PT Goal 1   Goal Identifier 1   Goal Description Patient will be independent with home exercise program and self-care   Target Date 04/30/25   PT Goal 2  "  Goal Identifier 2   Goal Description Patient will be able to walk 20 minutes for exercise, 4x/week for improvement in health and well-being   Goal Progress Still completing but pulled back slightly   Target Date 04/30/25   Date Met 02/19/25   PT Goal 3   Goal Identifier 3   Goal Description Patient will be able to do wood working with pain <6/10 for improvement in QOL   Goal Progress Better- low pain of about a 2   Target Date 04/29/25   Date Met 04/24/25   Subjective Report   Subjective Report He is a little stiff and was thankful he had an appointment   Objective Measure 1   Objective Measure CARMINE 24% on IE   Details 20% on 6/12/25   Objective Measure 2   Objective Measure pain level   Details 0   Objective Measure 3   Objective Measure AROM lumbar flexion 12.5 cm fingertip to floor on 6/12/25   Details was 18 cm on IE   Therapeutic Procedure/Exercise   Therapeutic Procedures: strength, endurance, ROM, flexibility minutes (06367) 15   Ther Proc 1 supine hip ADD and ABD isometrics xHEP   Ther Proc 1 - Details supine trunk rotation with pectoral stretch x HEP   Ther Proc 2 Seated hamstring stretch x HEP   Ther Proc 2 - Details supine hip hike on R -HEP   Ther Proc 3 treadmil x 5 min at 2.3 MPH with subjective assessment   Ther Proc 3 - Details supine hip flexor stretch x HEP   Ther Proc 4 Guteal myofascial- full arc, top ar and lower arc x HEP   Ther Proc 4 - Details bridge with toes lifted x patient demo'd and then educated on inc hold time when he reaches 30   Ther Proc 5 Driving range- not yet   Ther Proc 5 - Details all 4's hip extension x HEP, trunk rotation x HEP, dong pose x 20\"   Ther Proc 6 Unilateral pectoral stretch x 30\"/leg   Ther Proc 6 - Details Median nerve glide xHEP   Skilled Intervention AROM of the lumbar spine measured   Manual Therapy   Manual Therapy: Mobilization, MFR, MLD, friction massage minutes (45730) 24   Manual Therapy 1 Patinet positioned in prone- MFR layer III to B QL, glut med " and passive quad stretch. Sacral unlaoding. Focused a little more on T-L junction paraspsinals   Skilled Intervention to improve flexibility and reduce tension   Patient Response/Progress tolerated well   Education   Learner/Method Patient   Plan   Home program PTRX   Plan for next session finalize HEP   Comments   Comments Daniel is presenting for his 11th PT appointment. HE recently saw Dr. Greenberg who would like him to be seen once a month for 4 more visits. PT is in agreement and we will conitnue to challenge HEP.   Total Session Time   Timed Code Treatment Minutes 39   Total Treatment Time (sum of timed and untimed services) 39

## 2025-07-28 ENCOUNTER — THERAPY VISIT (OUTPATIENT)
Dept: PHYSICAL THERAPY | Facility: REHABILITATION | Age: 80
End: 2025-07-28
Payer: COMMERCIAL

## 2025-07-28 DIAGNOSIS — M54.50 LUMBAR SPINE PAIN: Primary | ICD-10-CM

## 2025-07-28 DIAGNOSIS — M47.816 LUMBAR FACET ARTHROPATHY: ICD-10-CM

## 2025-07-28 PROCEDURE — 97140 MANUAL THERAPY 1/> REGIONS: CPT | Mod: GP | Performed by: PHYSICAL THERAPIST

## 2025-07-28 PROCEDURE — 97110 THERAPEUTIC EXERCISES: CPT | Mod: GP | Performed by: PHYSICAL THERAPIST

## 2025-08-21 ENCOUNTER — THERAPY VISIT (OUTPATIENT)
Dept: PHYSICAL THERAPY | Facility: REHABILITATION | Age: 80
End: 2025-08-21
Payer: COMMERCIAL

## 2025-08-21 DIAGNOSIS — M47.816 LUMBAR FACET ARTHROPATHY: ICD-10-CM

## 2025-08-21 DIAGNOSIS — M54.50 LUMBAR SPINE PAIN: Primary | ICD-10-CM

## 2025-08-21 DIAGNOSIS — M41.9 SCOLIOSIS OF LUMBAR SPINE, UNSPECIFIED SCOLIOSIS TYPE: ICD-10-CM

## 2025-09-04 ENCOUNTER — OFFICE VISIT (OUTPATIENT)
Dept: PHYSICAL MEDICINE AND REHAB | Facility: CLINIC | Age: 80
End: 2025-09-04
Payer: COMMERCIAL

## 2025-09-04 VITALS — SYSTOLIC BLOOD PRESSURE: 110 MMHG | HEART RATE: 73 BPM | DIASTOLIC BLOOD PRESSURE: 55 MMHG

## 2025-09-04 DIAGNOSIS — M48.061 FORAMINAL STENOSIS OF LUMBAR REGION: ICD-10-CM

## 2025-09-04 DIAGNOSIS — M43.8X9 SAGITTAL PLANE IMBALANCE: ICD-10-CM

## 2025-09-04 DIAGNOSIS — M41.9 SCOLIOSIS OF LUMBAR SPINE, UNSPECIFIED SCOLIOSIS TYPE: ICD-10-CM

## 2025-09-04 DIAGNOSIS — M53.3 SACROILIAC JOINT PAIN: ICD-10-CM

## 2025-09-04 DIAGNOSIS — M47.816 LUMBAR FACET ARTHROPATHY: Primary | ICD-10-CM

## 2025-09-04 ASSESSMENT — PAIN SCALES - GENERAL: PAINLEVEL_OUTOF10: MILD PAIN (1)
